# Patient Record
Sex: FEMALE | Race: OTHER | HISPANIC OR LATINO | Employment: FULL TIME | URBAN - METROPOLITAN AREA
[De-identification: names, ages, dates, MRNs, and addresses within clinical notes are randomized per-mention and may not be internally consistent; named-entity substitution may affect disease eponyms.]

---

## 2020-12-21 ENCOUNTER — NURSE TRIAGE (OUTPATIENT)
Dept: OTHER | Facility: OTHER | Age: 25
End: 2020-12-21

## 2020-12-21 DIAGNOSIS — B34.9 VIRAL SYNDROME: Primary | ICD-10-CM

## 2020-12-21 DIAGNOSIS — B34.9 VIRAL SYNDROME: ICD-10-CM

## 2020-12-21 PROCEDURE — U0003 INFECTIOUS AGENT DETECTION BY NUCLEIC ACID (DNA OR RNA); SEVERE ACUTE RESPIRATORY SYNDROME CORONAVIRUS 2 (SARS-COV-2) (CORONAVIRUS DISEASE [COVID-19]), AMPLIFIED PROBE TECHNIQUE, MAKING USE OF HIGH THROUGHPUT TECHNOLOGIES AS DESCRIBED BY CMS-2020-01-R: HCPCS | Performed by: FAMILY MEDICINE

## 2020-12-22 LAB — SARS-COV-2 RNA SPEC QL NAA+PROBE: NOT DETECTED

## 2020-12-30 ENCOUNTER — ANESTHESIA EVENT (OUTPATIENT)
Dept: PERIOP | Facility: HOSPITAL | Age: 25
End: 2020-12-30
Payer: COMMERCIAL

## 2020-12-30 ENCOUNTER — ANESTHESIA (OUTPATIENT)
Dept: PERIOP | Facility: HOSPITAL | Age: 25
End: 2020-12-30
Payer: COMMERCIAL

## 2020-12-30 ENCOUNTER — ULTRASOUND (OUTPATIENT)
Dept: OBGYN CLINIC | Facility: CLINIC | Age: 25
End: 2020-12-30
Payer: COMMERCIAL

## 2020-12-30 ENCOUNTER — ROUTINE PRENATAL (OUTPATIENT)
Dept: OBGYN CLINIC | Facility: CLINIC | Age: 25
End: 2020-12-30
Payer: COMMERCIAL

## 2020-12-30 ENCOUNTER — HOSPITAL ENCOUNTER (OUTPATIENT)
Facility: HOSPITAL | Age: 25
Setting detail: OUTPATIENT SURGERY
Discharge: HOME | End: 2020-12-30
Attending: OBSTETRICS & GYNECOLOGY | Admitting: OBSTETRICS & GYNECOLOGY
Payer: COMMERCIAL

## 2020-12-30 VITALS
RESPIRATION RATE: 18 BRPM | OXYGEN SATURATION: 98 % | HEIGHT: 60 IN | BODY MASS INDEX: 20.82 KG/M2 | DIASTOLIC BLOOD PRESSURE: 56 MMHG | HEART RATE: 94 BPM | SYSTOLIC BLOOD PRESSURE: 114 MMHG | WEIGHT: 106.04 LBS | TEMPERATURE: 98.2 F

## 2020-12-30 VITALS — HEART RATE: 65 BPM

## 2020-12-30 DIAGNOSIS — Z36.9 ANTENATAL SCREENING ENCOUNTER: Primary | ICD-10-CM

## 2020-12-30 DIAGNOSIS — O20.9 BLEEDING IN EARLY PREGNANCY: ICD-10-CM

## 2020-12-30 DIAGNOSIS — O02.1 MISSED AB: ICD-10-CM

## 2020-12-30 DIAGNOSIS — Z98.890 S/P D&C (STATUS POST DILATION AND CURETTAGE): Primary | ICD-10-CM

## 2020-12-30 DIAGNOSIS — O02.1 MISSED ABORTION: Primary | ICD-10-CM

## 2020-12-30 PROBLEM — Z87.891 EX-SMOKER: Status: ACTIVE | Noted: 2020-12-30

## 2020-12-30 PROBLEM — Z86.2 HISTORY OF ANEMIA: Status: ACTIVE | Noted: 2020-12-30

## 2020-12-30 LAB
ABO GROUP BLD: NORMAL
ABO GROUP BLD: NORMAL
BLD GP AB SCN SERPL QL: NEGATIVE
ERYTHROCYTE [DISTWIDTH] IN BLOOD BY AUTOMATED COUNT: 12.9 % (ref 11.6–15.1)
HCT VFR BLD AUTO: 36.7 % (ref 34.8–46.1)
HGB BLD-MCNC: 12.1 G/DL (ref 11.5–15.4)
MCH RBC QN AUTO: 31.3 PG (ref 26.8–34.3)
MCHC RBC AUTO-ENTMCNC: 33 G/DL (ref 31.4–37.4)
MCV RBC AUTO: 95 FL (ref 82–98)
PLATELET # BLD AUTO: 220 THOUSANDS/UL (ref 149–390)
PMV BLD AUTO: 10.6 FL (ref 8.9–12.7)
RBC # BLD AUTO: 3.87 MILLION/UL (ref 3.81–5.12)
RH BLD: POSITIVE
RH BLD: POSITIVE
SPECIMEN EXPIRATION DATE: NORMAL
WBC # BLD AUTO: 8.03 THOUSAND/UL (ref 4.31–10.16)

## 2020-12-30 PROCEDURE — 59820 CARE OF MISCARRIAGE: CPT | Performed by: OBSTETRICS & GYNECOLOGY

## 2020-12-30 PROCEDURE — 88305 TISSUE EXAM BY PATHOLOGIST: CPT | Performed by: PATHOLOGY

## 2020-12-30 PROCEDURE — 86850 RBC ANTIBODY SCREEN: CPT | Performed by: OBSTETRICS & GYNECOLOGY

## 2020-12-30 PROCEDURE — 85027 COMPLETE CBC AUTOMATED: CPT | Performed by: OBSTETRICS & GYNECOLOGY

## 2020-12-30 PROCEDURE — 86901 BLOOD TYPING SEROLOGIC RH(D): CPT | Performed by: OBSTETRICS & GYNECOLOGY

## 2020-12-30 PROCEDURE — 99203 OFFICE O/P NEW LOW 30 MIN: CPT | Performed by: OBSTETRICS & GYNECOLOGY

## 2020-12-30 PROCEDURE — 86900 BLOOD TYPING SEROLOGIC ABO: CPT | Performed by: OBSTETRICS & GYNECOLOGY

## 2020-12-30 PROCEDURE — 76817 TRANSVAGINAL US OBSTETRIC: CPT | Performed by: OBSTETRICS & GYNECOLOGY

## 2020-12-30 RX ORDER — MIDAZOLAM HYDROCHLORIDE 2 MG/2ML
INJECTION, SOLUTION INTRAMUSCULAR; INTRAVENOUS AS NEEDED
Status: DISCONTINUED | OUTPATIENT
Start: 2020-12-30 | End: 2020-12-30

## 2020-12-30 RX ORDER — ONDANSETRON 2 MG/ML
4 INJECTION INTRAMUSCULAR; INTRAVENOUS EVERY 6 HOURS PRN
Status: DISCONTINUED | OUTPATIENT
Start: 2020-12-30 | End: 2020-12-30 | Stop reason: HOSPADM

## 2020-12-30 RX ORDER — ONDANSETRON 2 MG/ML
INJECTION INTRAMUSCULAR; INTRAVENOUS AS NEEDED
Status: DISCONTINUED | OUTPATIENT
Start: 2020-12-30 | End: 2020-12-30

## 2020-12-30 RX ORDER — IBUPROFEN 600 MG/1
600 TABLET ORAL EVERY 6 HOURS PRN
Qty: 30 TABLET | Refills: 0
Start: 2020-12-30 | End: 2021-07-20

## 2020-12-30 RX ORDER — ONDANSETRON 2 MG/ML
4 INJECTION INTRAMUSCULAR; INTRAVENOUS ONCE AS NEEDED
Status: DISCONTINUED | OUTPATIENT
Start: 2020-12-30 | End: 2020-12-30 | Stop reason: HOSPADM

## 2020-12-30 RX ORDER — LIDOCAINE HYDROCHLORIDE 20 MG/ML
INJECTION, SOLUTION EPIDURAL; INFILTRATION; INTRACAUDAL; PERINEURAL AS NEEDED
Status: DISCONTINUED | OUTPATIENT
Start: 2020-12-30 | End: 2020-12-30

## 2020-12-30 RX ORDER — DEXAMETHASONE SODIUM PHOSPHATE 4 MG/ML
INJECTION, SOLUTION INTRA-ARTICULAR; INTRALESIONAL; INTRAMUSCULAR; INTRAVENOUS; SOFT TISSUE AS NEEDED
Status: DISCONTINUED | OUTPATIENT
Start: 2020-12-30 | End: 2020-12-30

## 2020-12-30 RX ORDER — PROPOFOL 10 MG/ML
INJECTION, EMULSION INTRAVENOUS AS NEEDED
Status: DISCONTINUED | OUTPATIENT
Start: 2020-12-30 | End: 2020-12-30

## 2020-12-30 RX ORDER — DOXYCYCLINE HYCLATE 100 MG/1
200 CAPSULE ORAL ONCE
Status: DISCONTINUED | OUTPATIENT
Start: 2020-12-30 | End: 2020-12-30

## 2020-12-30 RX ORDER — EPHEDRINE SULFATE 50 MG/ML
INJECTION INTRAVENOUS AS NEEDED
Status: DISCONTINUED | OUTPATIENT
Start: 2020-12-30 | End: 2020-12-30

## 2020-12-30 RX ORDER — IBUPROFEN 600 MG/1
600 TABLET ORAL EVERY 6 HOURS PRN
Status: DISCONTINUED | OUTPATIENT
Start: 2020-12-30 | End: 2020-12-30 | Stop reason: HOSPADM

## 2020-12-30 RX ORDER — FENTANYL CITRATE/PF 50 MCG/ML
25 SYRINGE (ML) INJECTION
Status: DISCONTINUED | OUTPATIENT
Start: 2020-12-30 | End: 2020-12-30 | Stop reason: HOSPADM

## 2020-12-30 RX ORDER — FENTANYL CITRATE 50 UG/ML
INJECTION, SOLUTION INTRAMUSCULAR; INTRAVENOUS AS NEEDED
Status: DISCONTINUED | OUTPATIENT
Start: 2020-12-30 | End: 2020-12-30

## 2020-12-30 RX ORDER — KETOROLAC TROMETHAMINE 30 MG/ML
INJECTION, SOLUTION INTRAMUSCULAR; INTRAVENOUS AS NEEDED
Status: DISCONTINUED | OUTPATIENT
Start: 2020-12-30 | End: 2020-12-30

## 2020-12-30 RX ORDER — ACETAMINOPHEN 325 MG/1
650 TABLET ORAL EVERY 6 HOURS PRN
Status: DISCONTINUED | OUTPATIENT
Start: 2020-12-30 | End: 2020-12-30 | Stop reason: HOSPADM

## 2020-12-30 RX ORDER — SENNOSIDES 8.6 MG
650 CAPSULE ORAL EVERY 8 HOURS PRN
Qty: 30 TABLET | Refills: 0
Start: 2020-12-30 | End: 2021-07-20

## 2020-12-30 RX ORDER — SODIUM CHLORIDE 9 MG/ML
125 INJECTION, SOLUTION INTRAVENOUS CONTINUOUS
Status: DISCONTINUED | OUTPATIENT
Start: 2020-12-30 | End: 2020-12-30 | Stop reason: HOSPADM

## 2020-12-30 RX ADMIN — MIDAZOLAM 2 MG: 1 INJECTION INTRAMUSCULAR; INTRAVENOUS at 13:39

## 2020-12-30 RX ADMIN — SODIUM CHLORIDE 125 ML/HR: 0.9 INJECTION, SOLUTION INTRAVENOUS at 13:19

## 2020-12-30 RX ADMIN — LIDOCAINE HYDROCHLORIDE 40 MG: 20 INJECTION, SOLUTION EPIDURAL; INFILTRATION; INTRACAUDAL; PERINEURAL at 13:49

## 2020-12-30 RX ADMIN — FENTANYL CITRATE 50 MCG: 50 INJECTION, SOLUTION INTRAMUSCULAR; INTRAVENOUS at 13:58

## 2020-12-30 RX ADMIN — DEXAMETHASONE SODIUM PHOSPHATE 4 MG: 4 INJECTION INTRA-ARTICULAR; INTRALESIONAL; INTRAMUSCULAR; INTRAVENOUS; SOFT TISSUE at 13:49

## 2020-12-30 RX ADMIN — KETOROLAC TROMETHAMINE 30 MG: 30 INJECTION, SOLUTION INTRAMUSCULAR at 14:14

## 2020-12-30 RX ADMIN — ONDANSETRON 4 MG: 2 INJECTION INTRAMUSCULAR; INTRAVENOUS at 13:49

## 2020-12-30 RX ADMIN — SODIUM CHLORIDE: 0.9 INJECTION, SOLUTION INTRAVENOUS at 14:09

## 2020-12-30 RX ADMIN — FENTANYL CITRATE 25 MCG: 50 INJECTION, SOLUTION INTRAMUSCULAR; INTRAVENOUS at 14:06

## 2020-12-30 RX ADMIN — EPHEDRINE SULFATE 5 MG: 50 INJECTION, SOLUTION INTRAVENOUS at 14:09

## 2020-12-30 RX ADMIN — FENTANYL CITRATE 25 MCG: 50 INJECTION, SOLUTION INTRAMUSCULAR; INTRAVENOUS at 14:22

## 2020-12-30 RX ADMIN — DOXYCYCLINE 100 MG: 100 INJECTION, POWDER, LYOPHILIZED, FOR SOLUTION INTRAVENOUS at 13:19

## 2020-12-30 RX ADMIN — PROPOFOL 200 MG: 10 INJECTION, EMULSION INTRAVENOUS at 13:49

## 2020-12-30 NOTE — DISCHARGE INSTRUCTIONS
Dilation and Curettage   WHAT YOU NEED TO KNOW:   Dilation and curettage (D&C) is a procedure to remove tissue from the lining of your uterus  DISCHARGE INSTRUCTIONS:   Call 911 for any of the following: You have signs of an allergic reaction, such as hives, trouble breathing, or severe swelling  Seek care immediately if:   · You have heavy vaginal bleeding that soaks 1 pad in 1 hour for 2 hours in a row  · You have a fever higher than 100 4°F (38°C)  · You have abdominal cramps for more than 2 days  · Your pain does not get better, even after treatment  Contact your healthcare provider if:   · You have foul-smelling vaginal discharge  · You do not get your monthly period  · You feel depressed or anxious  · You feel very tired and weak  · You have questions or concerns about your condition or care  Medicines: You may need any of the following:  · Prescription pain medicine  may be given  Do not wait until the pain is severe before you take your medicine  Ask your healthcare provider how to take this medicine safely  · Antibiotics  help fight or prevent a bacterial infection  · Take your medicine as directed  Contact your healthcare provider if you think your medicine is not helping or if you have side effects  Tell him or her if you are allergic to any medicine  Keep a list of the medicines, vitamins, and herbs you take  Include the amounts, and when and why you take them  Bring the list or the pill bottles to follow-up visits  Carry your medicine list with you in case of an emergency  Self-care:   · Use sanitary pads if needed  You may have light bleeding for up to 2 weeks  Do not use tampons  Use sanitary pads instead  This will help prevent a vaginal infection  · Rest as needed  Slowly start to do more each day  Return to your daily activities as directed  · Do not have sex for at least 2 weeks after the procedure    This will help prevent an infection  · Use birth control right after your procedure  Your monthly period should start again in 4 to 8 weeks  During this time, you could still ovulate (release an egg)  Use birth control as directed to prevent pregnancy during this time  Follow up with your healthcare provider as directed:  Write down your questions so you remember to ask them during your visits  © Copyright [address] Information is for End User's use only and may not be sold, redistributed or otherwise used for commercial purposes  All illustrations and images included in CareNotes® are the copyrighted property of [employer]  or [address] [place], [place] [address]   The above information is an  only  It is not intended as medical advice for individual conditions or treatments  Talk to your doctor, nurse or pharmacist before following any medical regimen to see if it is safe and effective for you

## 2020-12-30 NOTE — OP NOTE
OPERATIVE REPORT  PATIENT NAME: Kelly Martinez    :  1995  MRN: [id number]  Pt Location: [facility] OR ROOM 01    SURGERY DATE: 2020    Surgeon(s) and Role:     * Stephanie Esqueda MD - Primary     * Jeffrey Fleming MD - Assisting    Preop Diagnosis:  Missed ab [O02 1]    Post-Op Diagnosis Codes:     * Missed ab [O02 1]    Procedure(s) (LRB):  DILATATION AND EVACUATION (D&E) ( 8 OF WEEKS) (N/A)    Specimen(s):  ID Type Source Tests Collected by Time Destination   1 :  Tissue Products of Conception TISSUE EXAM Stephanie Esqueda MD 2020 [id number]        Estimated Blood Loss:   150cc    Drains:  None    Anesthesia Type:   General LMA    Operative Indications:  Missed ab [O02 1]    Operative Findings:  Firm, mobile cervix and anteverted uterus, no palpable adnexal masses  Products of conception collected during procedure  Complications:   None    Procedure and Technique:  The patient was taken to the operating room where she was properly identified  General LMA anesthesia was obtained without difficulty  She was prepped and draped in the normal sterile fashion in the dorsal lithotomy position using the stirrups  Care was taken to avoid excessive flexion or extension of the patients hips and knees or pressure on her extremities  A time out was performed and everyone was in agreement  The patient received IV doxycyline in holding  The bladder was drained with a straight cath for 75cc of clear urine  A weighted speculum and right angle retractor were placed in the patient's vagina and the anterior lip of the cervix was identified and grasped with a tenanculum  The cervix was serially dilated to accommodate the suction device  An 8 curved tip was selected  This was advanced to the fundus and suction was activated  The products of conception were collected in the suction trap  The uterus was felt to clamp down   The suction tip was removed and gentle curettage was performed with a good cry noted throughout the cavity and no further products of conception obtained  The uterus was massaged and firm  All instruments were removed from the patient's vagina  There was no bleeding noted from tenaculum puncture sites  The patient tolerated the procedure well  Sponge, instrument and needle counts were correct times 2  The patient was cleansed, awakened from anesthesia and taken to the recovery room in stable condition      Dr Susi Heredia and I were present for the entire procedure    Patient Disposition:  PACU     SIGNATURE: Sandra Grimm MD  DATE: December 30, 2020  TIME: 2:34 PM

## 2021-01-14 ENCOUNTER — OFFICE VISIT (OUTPATIENT)
Dept: OBGYN CLINIC | Facility: CLINIC | Age: 26
End: 2021-01-14
Payer: COMMERCIAL

## 2021-01-14 VITALS — DIASTOLIC BLOOD PRESSURE: 64 MMHG | BODY MASS INDEX: 20.74 KG/M2 | WEIGHT: 106.2 LBS | SYSTOLIC BLOOD PRESSURE: 104 MMHG

## 2021-01-14 DIAGNOSIS — Z31.69 ENCOUNTER FOR PRECONCEPTION CONSULTATION: ICD-10-CM

## 2021-01-14 DIAGNOSIS — Z98.890 S/P D&C (STATUS POST DILATION AND CURETTAGE): Primary | ICD-10-CM

## 2021-01-14 PROCEDURE — 99024 POSTOP FOLLOW-UP VISIT: CPT | Performed by: OBSTETRICS & GYNECOLOGY

## 2021-01-14 NOTE — PATIENT INSTRUCTIONS
Planning for Pregnancy   WHAT YOU NEED TO KNOW:   Why is it important to plan for pregnancy? There are things you can do to get your body ready for a healthy pregnancy  A healthy pregnancy can improve your chance of having a healthy baby  The steps you need to take and the amount of time needed depends on your current health and habits  Work with your healthcare provider to help you plan a healthy pregnancy  What do I need to know about nutrition and exercise before pregnancy? · Eat a variety of healthy foods  Healthy foods include fruits, vegetables, whole-grain breads, low-fat dairy foods, beans, lean meats, and fish  Limit foods high in sugar, fat, and sodium  Limit your intake of fish to 2 servings each week  Choose fish low in mercury such as canned light tuna, shrimp, salmon, cod, or tilapia  Do not  eat fish high in mercury such as swordfish, tilefish, logan mackerel, and shark  · Take 400 micrograms (mcg) of folic acid each day  This will help to prevent birth defects of the brain and spine such as spina bifida  Most women should take folic acid before pregnancy and up to 12 weeks after getting pregnant  · Exercise for at least 30 minutes, 5 days a week  Some examples of exercise include walking, biking, dancing, and swimming  Include muscle strengthening activities 2 days each week  Regular exercise provides many health benefits  It helps you manage your weight, and decreases your risk for type 2 diabetes, heart disease, stroke, and high blood pressure  Exercise can also help improve your mood  Ask your healthcare provider about the best exercise plan for you  How does weight affect pregnancy? · Obesity  can make it harder for you to get pregnant  It also increases your risk of health problems during pregnancy  Some of these health problems include gestational diabetes, high blood pressure, and infections   It can also increase your baby's risk of health problems such as birth defects  Your baby may also be large and harder to deliver or be born prematurely (early)  Your risk of miscarriage is also higher if you are obese  Work with your healthcare provider to reach a healthy weight before you try to get pregnant  · Being underweight  can also make it hard for you to get pregnant  It can also increase your risk of having a premature baby and miscarriage  Your baby may be born at a low birth weight  What do I need to know about smoking, alcohol, and drugs? · Smoking  increases your risk of a miscarriage and other health problems during pregnancy  Smoking can cause your baby to be born too early or weigh less at birth  Ask your healthcare provider for information if you need help quitting  · Alcohol  passes from your body to your baby through the placenta  It can affect your baby's brain development and cause fetal alcohol syndrome (FAS)  FAS is a group of conditions that causes mental, behavior, and growth problems  Talk to your healthcare provider if you abuse alcohol and need help quitting before pregnancy  · Drugs , such as marijuana and cocaine, should not be used while you are trying to get pregnant or during pregnancy  They increase your risk of problems during pregnancy and increase the risk of having a baby with health problems  These include birth defects, premature birth, and infant death  What do I need to know about medicines and supplements? Tell your healthcare provider about all the medicines and supplements you take  Certain medicines and supplements should not be used during pregnancy  These include over-the-counter medicines, prescription medicines, vitamins, and herbal supplements  He or she may recommend that you take different medicines that are safer during pregnancy  What do I need to know about immunizations? Tell your healthcare provider about all the immunizations you have had   If you have missed any immunizations, your healthcare provider may recommend that you update your immunizations  These include hepatitis B, influenza, MMR (measles, mumps, rubella), Tdap, and varicella immunizations  What tests may I need to have before pregnancy? Your healthcare provider may recommend that you have tests to screen for sexually transmitted infections  These include chlamydia, gonorrhea, herpes, HIV infection, syphilis, and tuberculosis  These infectious diseases should be treated before pregnancy, if needed  What do I need to know about toxic substances? Toxic substances can harm a developing baby  Examples include cleaning products, paints, solvents, pesticides, and other chemical products  They can increase the risk of having a miscarriage, premature birth, and low-birth weight baby  They also increase the risk of developmental delay and childhood cancer  Avoid exposure to toxic substances and materials at work and home  What do I need to know about genetic testing? Tell your healthcare provider about your and your partner's family history of genetic disorders, developmental delays, or other disabilities  Also tell your healthcare provider about any problems you have had in previous pregnancies  Your healthcare provider may recommend that you see a healthcare professional called a genetic counselor  He or she will talk to you about how genes, birth defects, and other medical conditions are passed down  He or she can also tell you about your risk for passing a genetic disease in a future pregnancy  How can I prepare for pregnancy if I have a medical condition? Medical conditions such as diabetes, high blood pressure, asthma, seizure disorders, and thyroid disorders should be managed before pregnancy  Mental health conditions, such as depression and anxiety, should also be treated  This will decrease your risk of having health problems during pregnancy  It will also decrease your baby's risk of medical problems   Medicines used to treat certain conditions are not safe to use during pregnancy and may need to be changed before you get pregnant  Ask your healthcare provider if it is safe for you to get pregnant if you have a medical condition  CARE AGREEMENT:   You have the right to help plan your care  Learn about your health condition and how it may be treated  Discuss treatment options with your healthcare providers to decide what care you want to receive  You always have the right to refuse treatment  The above information is an  only  It is not intended as medical advice for individual conditions or treatments  Talk to your doctor, nurse or pharmacist before following any medical regimen to see if it is safe and effective for you  © Copyright [address] Information is for End User's use only and may not be sold, redistributed or otherwise used for commercial purposes   All illustrations and images included in CareNotes® are the copyrighted property of [employer]  or [address] [place] [address]

## 2021-01-14 NOTE — PROGRESS NOTES
Assessment/Plan   Diagnoses and all orders for this visit:    S/P D&C (status post dilation and curettage)    Encounter for preconception consultation    1  Status post D&E for missed AB-done 20  Patient did have some intermittently heavy bleeding but denies any symptoms at this time  Exam is benign  She can proceed with activity as tolerated  2  Rh positive  3  Pregnancy history- 97 4 weeks complicated by slow leak,  32 weeks secondary to cervical dilation   done to anxiety attack which affected her and the baby's heart rate  Termination , D&E after miscarriage 18  The termination and the prior D&E and current D&E pregnancies were with her current partner  Partner also has a child with another  They did not use protection between 2018 and this current nonviable pregnancy  4  History of irregular menses-menstrual cycles every 1-2 months time period  5  Pre conceptual-to continue with vitamins with folic acid  She denies any history of birth defects in her or her partner's family  Recommend up-to-date with vaccination  Patient and her partner have embarked on healthier lifestyle, my support was given  She had concerns about fertility, as no pregnancy between May 2018 and 2020  Should she not get pregnant in 1 year's time, could consider fertility evaluation  She states her employer does provide coverage for fertility evaluation, which could be utilized if needed  6  Other-last Pap documented  at [facility]  To return in the near future for yearly exam or as needed  Subjective   Patient ID: Carmen Gandhi is a 31 y o  female  Vitals:    21 1142   BP: 104/64     Patient was seen today for follow-up visit  Please see assessment plan for details        The following portions of the patient's history were reviewed and updated as appropriate: allergies, current medications, past family history, past medical history, past social history, past surgical history and problem list   Past Medical History:   Diagnosis Date    Former smoker     Stop smoking 2020     Past Surgical History:   Procedure Laterality Date     SECTION  2017    Patient states anxiety attack which affected fetal tracing necessitating    DILATION AND EVACUATION  2016    Elective termination    DILATION AND EVACUATION  2018    Persistent Missed pregnancy    TX SURG RX MISSED ABORTN,1ST TRI N/A 2020    Procedure: DILATATION AND EVACUATION (D&E) ( 8 OF WEEKS); Surgeon: Amy Mathias MD;  Location: [facility];  Service: Gynecology    WISDOM TOOTH EXTRACTION       OB History    Para Term  AB Living   5 2 2   2 2   SAB TAB Ectopic Multiple Live Births   1 1     2      # Outcome Date GA Lbr Mo/2nd Weight Sex Delivery Anes PTL Lv   5 SAB 2018           4 Term 2017     CS-Unspec   OLLIE      Birth Comments: Had anxiety attack affecting fetal status, had      3 TAB 2016           2 Term 2014     Vag-Spont   OLLIE   1                 Current Outpatient Medications:     acetaminophen (TYLENOL) 650 mg CR tablet, Take 1 tablet (650 mg total) by mouth every 8 (eight) hours as needed for mild pain, Disp: 30 tablet, Rfl: 0    Prenatal Vit-Fe Fumarate-FA (PRENATAL VITAMIN PO), Take by mouth, Disp: , Rfl:     ibuprofen (MOTRIN) 600 mg tablet, Take 1 tablet (600 mg total) by mouth every 6 (six) hours as needed for moderate pain (Patient not taking: Reported on 2021), Disp: 30 tablet, Rfl: 0  Allergies   Allergen Reactions    Penicillins Hives     Social History     Socioeconomic History    Marital status: Single     Spouse name: None    Number of children: None    Years of education: None    Highest education level: None   Occupational History    None   Social Needs    Financial resource strain: None    Food insecurity     Worry: None     Inability: None    Transportation needs     Medical: None Non-medical: None   Tobacco Use    Smoking status: Former Smoker     Types: Cigarettes    Smokeless tobacco: Former User    Tobacco comment: quit 09/2020   Substance and Sexual Activity    Alcohol use: Yes     Frequency: Monthly or less    Drug use: No    Sexual activity: Yes     Partners: Male   Lifestyle    Physical activity     Days per week: None     Minutes per session: None    Stress: None   Relationships    Social connections     Talks on phone: None     Gets together: None     Attends Evangelical service: None     Active member of club or organization: None     Attends meetings of clubs or organizations: None     Relationship status: None    Intimate partner violence     Fear of current or ex partner: None     Emotionally abused: None     Physically abused: None     Forced sexual activity: None   Other Topics Concern    None   Social History Narrative    None     History reviewed  No pertinent family history  Review of Systems   Constitutional: Negative for chills, diaphoresis, fatigue and fever  Respiratory: Negative for apnea, cough, chest tightness, shortness of breath and wheezing  Cardiovascular: Negative for chest pain, palpitations and leg swelling  Gastrointestinal: Negative for abdominal distention, abdominal pain, anal bleeding, constipation, diarrhea, nausea, rectal pain and vomiting  Genitourinary: Negative for difficulty urinating, dyspareunia, dysuria, frequency, hematuria, menstrual problem, pelvic pain, urgency, vaginal bleeding, vaginal discharge and vaginal pain  Musculoskeletal: Negative for arthralgias, back pain and myalgias  Skin: Negative for color change and rash  Neurological: Negative for dizziness, syncope, light-headedness, numbness and headaches  Hematological: Negative for adenopathy  Does not bruise/bleed easily  Psychiatric/Behavioral: Negative for dysphoric mood and sleep disturbance  The patient is not nervous/anxious          Objective Physical Exam    Objective      /64 (BP Location: Left arm, Patient Position: Sitting, Cuff Size: Adult)   Wt 48 2 kg (106 lb 3 2 oz)   BMI 20 74 kg/m²     General:   alert and oriented, in no acute distress   Neck:    Breast:    Heart:    Lungs:    Abdomen: soft, non-tender, without masses or organomegaly   Vulva: normal   Vagina: Without erythema or lesions or discharge  Normal   Cervix: Without lesions or discharge or cervicitis    No Cervical motion tenderness   Uterus: top normal size, anteverted, non-tender   Adnexa: no mass, fullness, tenderness   Rectum: deferred    Psych:  Normal mood and affect   Skin:  Without obvious lesions   Eyes: symmetric, with normal movements and reactivity   Musculoskeletal:  Normal muscle tone and movements appreciated

## 2021-01-20 ENCOUNTER — NURSE TRIAGE (OUTPATIENT)
Dept: OTHER | Facility: OTHER | Age: 26
End: 2021-01-20

## 2021-01-20 DIAGNOSIS — Z20.828 EXPOSURE TO SARS-ASSOCIATED CORONAVIRUS: ICD-10-CM

## 2021-01-20 DIAGNOSIS — Z20.828 EXPOSURE TO SARS-ASSOCIATED CORONAVIRUS: Primary | ICD-10-CM

## 2021-01-20 PROCEDURE — 87637 SARSCOV2&INF A&B&RSV AMP PRB: CPT | Performed by: FAMILY MEDICINE

## 2021-01-20 NOTE — TELEPHONE ENCOUNTER
Patient does not have a PCP and is interested in finding a PCP  Patient would like to see Dr Luis M Olivas  Patient was provided with his office number      Reason for Disposition   [1] COVID-19 infection suspected by caller or triager AND [2] mild symptoms (cough, fever, or others) AND [6] no complications or SOB    Protocols used: CORONAVIRUS (COVID-19) [facility]NOS[facility]

## 2021-01-20 NOTE — TELEPHONE ENCOUNTER
Regarding: COVID - Symptomatic - Congestion and headache   ----- Message from Kamini Banks sent at 1/20/2021  9:52 AM EST -----  "My  tested positive on Monday and I was in contact with her last on Friday  I now I have congestion and headache "     NOTE: Sometimes her phone drops calls  Please try to call her again if it does so   [phone] is a back up number

## 2021-01-20 NOTE — TELEPHONE ENCOUNTER
1  Were you within 6 feet or less, for up to 15 minutes or more with a person that has a confirmed COVID-19 test? Yes, manager at work  2  What was the date of your exposure? 01/15/2021 Symptoms started  01/14/2021  3  Are you experiencing any symptoms attributed to the virus?  (Assess for SOB, cough, fever, difficulty breathing) JOY , slight runny nose, nasal congestion, and fatigue  4  HIGH RISK: Do you have any history heart or lung conditions, weakened immune system, diabetes, Asthma, CHF, HIV, COPD, Chemo, renal failure, sickle cell, etc? Healthy  5  PREGNANCY: Are you pregnant or did you recently give birth? Possibly, had a miscarriage last month  Hasn't had regular period yet

## 2021-01-21 LAB
FLUAV RNA NPH QL NAA+PROBE: NOT DETECTED
FLUBV RNA NPH QL NAA+PROBE: NOT DETECTED
RSV RNA NPH QL NAA+PROBE: NOT DETECTED
SARS-COV-2 RNA NPH QL NAA+PROBE: NOT DETECTED

## 2021-06-08 ENCOUNTER — OFFICE VISIT (OUTPATIENT)
Dept: OBGYN CLINIC | Facility: CLINIC | Age: 26
End: 2021-06-08
Payer: COMMERCIAL

## 2021-06-08 VITALS — SYSTOLIC BLOOD PRESSURE: 110 MMHG | DIASTOLIC BLOOD PRESSURE: 64 MMHG | BODY MASS INDEX: 22.07 KG/M2 | WEIGHT: 113 LBS

## 2021-06-08 DIAGNOSIS — O09.899 HISTORY OF PRETERM DELIVERY, CURRENTLY PREGNANT: ICD-10-CM

## 2021-06-08 DIAGNOSIS — Z98.891 HISTORY OF CESAREAN SECTION: ICD-10-CM

## 2021-06-08 DIAGNOSIS — Z32.00 POSSIBLE PREGNANCY: ICD-10-CM

## 2021-06-08 DIAGNOSIS — Z87.59 HISTORY OF MISCARRIAGE: ICD-10-CM

## 2021-06-08 DIAGNOSIS — Z34.90 PREGNANCY AT EARLY STAGE: Primary | ICD-10-CM

## 2021-06-08 PROBLEM — O02.1 MISSED ABORTION: Status: RESOLVED | Noted: 2020-12-30 | Resolved: 2021-06-08

## 2021-06-08 LAB — SL AMB POCT URINE HCG: POSITIVE

## 2021-06-08 PROCEDURE — 99214 OFFICE O/P EST MOD 30 MIN: CPT | Performed by: OBSTETRICS & GYNECOLOGY

## 2021-06-08 NOTE — PROGRESS NOTES
Assessment/Plan   Diagnoses and all orders for this visit:    Pregnancy at early stage  -     hCG, quantitative; Future  -     hCG, quantitative; Future  -     hCG, quantitative; Future  -     hCG, quantitative  -     hCG, quantitative; Future  -     hCG, quantitative    Possible pregnancy  -     POCT urine HCG  -     Obstetric Panel W/Fourth Generation HIV; Future  -     Obstetric Panel W/Fourth Generation HIV  -     Hepatitis panel, acute; Future  -     Hepatitis panel, acute    History of  delivery, currently pregnant    History of  section    History of miscarriage    1  Pregnancy confirmation visit-patient noted LMP of 21, placing her at 5 3 weeks gestation  She had positive pregnancy test on 21, confirmed today  Patient and her partner were counseled about the findings  She continues with prenatal vitamins  She denies any birth defects in her or her partner's family  Medication sheet, nutrition sheet, were given to the patient and reviewed  Laboratory sheet was given to get prenatal labs  Up 2-3 weeks time for ultrasound and 1st prenatal visit  2  Rh positive  3  History of EAB x1, D&E x2 for missed pregnancy  Last D&E was 20 with me  Given this history, given laboratory sheet to get HCG to repeat in 2 days time to assess trajectory  4  Previous  at 03 4 weeks complicated by slow leak  Had  at 32 weeks secondary to the cervix becoming dilated  Patient unclear exactly why  was being done  She had anxiety attack with fetal heart rate changes and more emergent   Given the  delivery x2, could consider Priti  To check old records  Counseled briefly about trial of labor after  and she seems to be interested in this  We will continue discussion  5  History of irregular menses-generally every 1-2 months time  6  Last Pap-2016  To proceed with Pap smear with 1st prenatal visit        Subjective   Patient ID: Siddhartha Coleman Miguel A Estrada is a 31 y o  female  Vitals:    21 1450   BP: 110/64     Patient was seen today for pregnancy confirmation visit  Please see assessment plan for details  The following portions of the patient's history were reviewed and updated as appropriate: allergies, current medications, past family history, past medical history, past social history, past surgical history and problem list   Past Medical History:   Diagnosis Date    Former smoker     Stop smoking 2020     Past Surgical History:   Procedure Laterality Date     SECTION      Patient states anxiety attack which affected fetal tracing necessitating    DILATION AND EVACUATION  2016    Elective termination    DILATION AND EVACUATION  2018    Persistent Missed pregnancy    NV SURG RX MISSED ABORTN,1ST TRI N/A 2020    Procedure: DILATATION AND EVACUATION (D&E) ( 8 OF WEEKS); Surgeon: Ariana Moraes MD;  Location: [facility];  Service: Gynecology    WISDOM TOOTH EXTRACTION       OB History    Para Term  AB Living   5 2 2   2 2   SAB TAB Ectopic Multiple Live Births   1 1     2      # Outcome Date GA Lbr Mo/2nd Weight Sex Delivery Anes PTL Lv   5 SAB 2018           4 Term 2017     CS-Unspec   OLLIE      Birth Comments: Had anxiety attack affecting fetal status, had      3 TAB 2016           2 Term 2014     Vag-Spont   OLLIE   1                 Current Outpatient Medications:     acetaminophen (TYLENOL) 650 mg CR tablet, Take 1 tablet (650 mg total) by mouth every 8 (eight) hours as needed for mild pain, Disp: 30 tablet, Rfl: 0    Prenatal Vit-Fe Fumarate-FA (PRENATAL VITAMIN PO), Take by mouth, Disp: , Rfl:     ibuprofen (MOTRIN) 600 mg tablet, Take 1 tablet (600 mg total) by mouth every 6 (six) hours as needed for moderate pain (Patient not taking: Reported on 2021), Disp: 30 tablet, Rfl: 0  Allergies   Allergen Reactions    Penicillins Hives     Social History Socioeconomic History    Marital status: Single     Spouse name: None    Number of children: None    Years of education: None    Highest education level: None   Occupational History    None   Social Needs    Financial resource strain: None    Food insecurity     Worry: None     Inability: None    Transportation needs     Medical: None     Non-medical: None   Tobacco Use    Smoking status: Former Smoker     Types: Cigarettes    Smokeless tobacco: Former User    Tobacco comment: quit 09/2020   Substance and Sexual Activity    Alcohol use: Not Currently     Frequency: Monthly or less    Drug use: No    Sexual activity: Yes     Partners: Male   Lifestyle    Physical activity     Days per week: None     Minutes per session: None    Stress: None   Relationships    Social connections     Talks on phone: None     Gets together: None     Attends Ca[employer] service: None     Active member of club or organization: None     Attends meetings of clubs or organizations: None     Relationship status: None    Intimate partner violence     Fear of current or ex partner: None     Emotionally abused: None     Physically abused: None     Forced sexual activity: None   Other Topics Concern    None   Social History Narrative    None     History reviewed  No pertinent family history  Review of Systems   Constitutional: Negative for chills, diaphoresis, fatigue and fever  Respiratory: Negative for apnea, cough, chest tightness, shortness of breath and wheezing  Cardiovascular: Negative for chest pain, palpitations and leg swelling  Gastrointestinal: Negative for abdominal distention, abdominal pain, anal bleeding, constipation, diarrhea, nausea, rectal pain and vomiting  Genitourinary: Negative for difficulty urinating, dyspareunia, dysuria, frequency, hematuria, menstrual problem, pelvic pain, urgency, vaginal bleeding, vaginal discharge and vaginal pain     Musculoskeletal: Negative for arthralgias, back pain and myalgias  Skin: Negative for color change and rash  Neurological: Negative for dizziness, syncope, light-headedness, numbness and headaches  Hematological: Negative for adenopathy  Does not bruise/bleed easily  Psychiatric/Behavioral: Negative for dysphoric mood and sleep disturbance  The patient is not nervous/anxious          Objective   Physical Exam  OBGyn Exam     Objective      /64 (BP Location: Left arm, Patient Position: Sitting, Cuff Size: Adult)   Wt 51 3 kg (113 lb)   LMP 05/01/2021   BMI 22 07 kg/m²     General:   alert and oriented, in no acute distress   Neck:    Breast:    Heart:    Lungs:    Abdomen: Nontender   Vulva:    Vagina:    Cervix:    Uterus:    Adnexa:    Rectum:     Psych:  Normal mood and affect   Skin:  Without obvious lesions   Eyes: symmetric, with normal movements and reactivity   Musculoskeletal:  Normal muscle tone and movements appreciated

## 2021-06-08 NOTE — PATIENT INSTRUCTIONS
Pregnancy   WHAT YOU NEED TO KNOW:   What do I need to know about pregnancy? A normal pregnancy lasts about 40 weeks  The first trimester lasts from your last period through the 12th week of pregnancy  The second trimester lasts from the 13th week through the 23rd week  The third trimester lasts from the 24th week until your baby is born  If you know the date of your last period, your healthcare provider can estimate your due date  You may give birth to your baby any time from 37 weeks to 2 weeks after your due date  What is prenatal care? Prenatal care is a series of visits with your healthcare provider throughout your pregnancy  Prenatal care can help prevent problems during pregnancy and childbirth  At each prenatal visit, your healthcare provider will weigh you and check your blood pressure  He or she will also check your baby's heartbeat and growth  You may need the following at some visits:  · A pelvic exam  allows your healthcare provider to see your cervix (the bottom part of your uterus)  Your provider will use a speculum to open your vagina  He or she will check the size and shape of your uterus  At your first prenatal visit, you may also have a Pap smear  This is a test to check your cervix for abnormal cells  · Blood tests  may be done to check for any of the following:    ? Gestational diabetes or anemia (low iron level)    ? Blood type or Rh factor, or certain birth defects    ? Immunity to certain diseases, such as chickenpox or rubella    ? An infection, such as a sexually transmitted infection, HIV, or hepatitis B    · Hepatitis B  may need to be prevented or treated  Hepatitis B is inflammation of the liver caused by the hepatitis B virus (HBV)  HBV can spread from a mother to her baby during delivery  You will be checked for HBV as early as possible in the first trimester of each pregnancy  You need the test even if you received the hepatitis B vaccine or were tested before   You may need to have an HBV infection treated before you give birth  · Urine tests  may also be done to check for sugar and protein  These can be signs of gestational diabetes or preeclampsia  Urine tests may also be done to check for signs of infection  · A fetal ultrasound  shows pictures of your baby inside your uterus  The pictures are used to check your baby's development, movement, and position  · Genetic disorder screening tests  may be offered to you  These tests check your baby's risk for genetic disorders such as Down syndrome  A screening test may include blood tests and an ultrasound  Blood tests may be used to check your DNA or your partner's DNA  Genetic tests are not always accurate or complete  Your baby may be born with a genetic disorder that did not show up in the tests  Talk to your healthcare provider about any concerns you have with genetic testing  What can I do to have a healthy pregnancy? · Eat a variety of healthy foods  Healthy foods include fruits, vegetables, whole-grain breads, low-fat dairy foods, beans, lean meats, and fish  Drink liquids as directed  Ask how much liquid to drink each day and which liquids are best for you  Limit caffeine to less than 200 milligrams each day  Limit your intake of fish to 2 servings each week  Choose fish low in mercury such as canned light tuna, shrimp, crab, salmon, cod, or tilapia  Do not  eat fish high in mercury such as swordfish, tilefish, logan mackerel, and shark  · Take prenatal vitamins as directed  Your need for certain vitamins and minerals, such as folic acid, increases during pregnancy  Prenatal vitamins provide some of the extra vitamins and minerals you need  Prenatal vitamins may also help to decrease the risk for certain birth defects  · Ask how much weight you should gain during your pregnancy  Too much or too little weight gain can be unhealthy for you and your baby      · Talk to your healthcare provider about exercise  Moderate exercise can help you stay fit  Your healthcare provider will help you plan an exercise program that is safe for you during pregnancy  · Do not smoke  Smoking increases your risk for a miscarriage and heart and blood vessel problems  Smoking can cause your baby to be born too early or weigh less at birth  Quit smoking as soon as you think you might be pregnant  Ask your healthcare provider for information if you need help quitting  · Do not drink alcohol  Alcohol passes from your body to your baby through the placenta  It can affect your baby's brain development and cause fetal alcohol syndrome (FAS)  FAS is a group of conditions that causes mental, behavior, and growth problems  · Talk to your healthcare provider before you take any medicines  Many medicines may harm your baby if you take them when you are pregnant  Do not take any medicines, vitamins, herbs, or supplements without first talking to your healthcare provider  Never use illegal or street drugs (such as marijuana or cocaine) while you are pregnant  What body changes may happen during my pregnancy? · Breast changes  you will experience include tenderness and tingling during the early part of your pregnancy  Your breasts will become larger  You may need to use a support bra  You may see a thin, yellow fluid, called colostrum, leak from your nipples during the second trimester  Colostrum is a liquid that changes to milk about 3 days after you give birth  · Skin changes and stretch marks  may occur during your pregnancy  You may have red marks, called stretch marks, on your skin  Stretch marks will usually fade after pregnancy  Use lotion if your skin is dry and itchy  The skin on your face, around your nipples, and below your belly button may darken  Most of the time, your skin will return to its normal color after your baby is born      · Morning sickness  is nausea and vomiting that can happen at any time of day  Avoid fatty and spicy foods  Eat small meals throughout the day instead of large meals  Rachel may help to decrease nausea  Ask your healthcare provider about other ways of decreasing nausea and vomiting  · Heartburn  may be caused by changes in your hormones during pregnancy  Your growing uterus may also push your stomach upward and force stomach acid to back up into your esophagus  Eat 4 or 5 small meals each day instead of large meals  Avoid spicy foods  Avoid eating right before bedtime  · Constipation  may develop during your pregnancy  To treat constipation, eat foods high in fiber such as fiber cereals, beans, fruits, vegetables, whole-grain breads, and prune juice  Get regular exercise and drink plenty of water  Your healthcare provider may also suggest a fiber supplement to soften your bowel movements  Talk to your healthcare provider before you use any medicines to decrease constipation  · Hemorrhoids  are enlarged veins in the rectal area  They may cause pain, itching, and bright red bleeding from your rectum  To decrease your risk for hemorrhoids, prevent constipation and do not strain to have a bowel movement  If you have hemorrhoids, soak in a tub of warm water to ease discomfort  Ask your healthcare provider how you can treat hemorrhoids  · Leg cramps and swelling  may be caused by low calcium levels or the added weight of pregnancy  Raise your legs above the level of your heart to decrease swelling  During a leg cramp, stretch or massage the muscle that has the cramp  Heat may help decrease pain and muscle spasms  Apply heat on your muscle for 20 to 30 minutes every 2 hours for as many days as directed  · Back pain  may occur as your baby grows  Do not stand for long periods of time or lift heavy items  Use good posture while you stand, squat, or bend  Wear low-heeled shoes with good support  Rest may also help to relieve back pain   Ask your healthcare provider about exercises you can do to strengthen your back muscles  What are some safety tips during pregnancy? · Avoid hot tubs and saunas  Do not use a hot tub or sauna while you are pregnant, especially during your first trimester  Hot tubs and saunas may raise your baby's temperature and increase the risk for birth defects  · Avoid toxoplasmosis  This is an infection caused by eating raw meat or being around infected cat feces  It can cause birth defects, miscarriages, and other problems  Wash your hands after you touch raw meat  Make sure any meat is well-cooked before you eat it  Avoid raw eggs and unpasteurized milk  Use gloves or ask someone else to clean your cat's litter box while you are pregnant  · Ask your healthcare provider about travel  The most comfortable time to travel is during the second trimester  Ask your healthcare provider if you can travel after 36 weeks  You may not be able to travel in an airplane after 36 weeks  He or she may also recommend that you avoid long road trips  When should I seek immediate care? · You develop a severe headache that does not go away  · You have new or increased vision changes, such as blurred or spotted vision  · You have new or increased swelling in your face or hands  · You have pain or cramping in your abdomen or low back  · You have vaginal bleeding  When should I call my doctor or obstetrician? · You have abdominal cramps, pressure, or tightening  · You have a change in vaginal discharge  · You cannot keep food or drinks down, and you are losing weight  · You have chills or a fever  · You have vaginal itching, burning, or pain  · You have yellow, green, white, or foul-smelling vaginal discharge  · You have pain or burning when you urinate, less urine than usual, or pink or bloody urine  · You have questions or concerns about your condition or care  CARE AGREEMENT:   You have the right to help plan your care  Learn about your health condition and how it may be treated  Discuss treatment options with your healthcare providers to decide what care you want to receive  You always have the right to refuse treatment  The above information is an  only  It is not intended as medical advice for individual conditions or treatments  Talk to your doctor, nurse or pharmacist before following any medical regimen to see if it is safe and effective for you  © Copyright [employer] 2021 Information is for End User's use only and may not be sold, redistributed or otherwise used for commercial purposes  All illustrations and images included in CareNotes® are the copyrighted property of [employer]  or [employer]   Pregnancy Diet   WHAT YOU NEED TO KNOW:   What is a healthy diet during pregnancy? A healthy diet during pregnancy is a meal plan that provides the amount of calories and nutrients you need during pregnancy  Your body needs extra calories and nutrients to support your growing baby  You need to gain the right amount of weight for a healthy baby and pregnancy  Babies born at a healthy weight have a lower risk of certain health problems at birth and later in life  A healthy diet may help you avoid gaining too much weight  Too much weight gain may cause problems for you during your pregnancy and delivery  What should I avoid while I am pregnant? · Alcohol:  Do not drink alcohol during pregnancy  Alcohol can increase your risk of a miscarriage (losing your baby)  Your baby may also be born too small and have other health problems, such as learning problems later in life  · Caffeine: It is not clear how caffeine affects pregnancy  Limit your intake of caffeine to avoid possible health problems  Caffeine may be found in coffee, tea, cola, sports drinks, and chocolate  · Foods that contain mercury:  Mercury is naturally found in almost all types of fish and shellfish   Some types of fish absorb higher levels of mercury that can be harmful to an unborn baby  Eat only fish and shellfish that are low in mercury  Each week, you may eat up to 12 ounces of fish or shellfish that have low levels of mercury  These include shrimp, canned light tuna, salmon, pollock, and catfish  Eat only 6 ounces of albacore (white) tuna per week  Albacore tuna has more mercury than canned tuna  Do not eat shark, swordfish, logan mackerel, or tilefish  · Raw and undercooked foods: You should not eat undercooked or raw meat, poultry, eggs, fish, or shellfish (shrimp, crab, lobster)  Cook leftover foods and ready-to-eat foods such as hot dogs until they are steaming hot  · Unpasteurized food:  Unpasteurized foods are foods that have not gone through the heating process (pasteurization) that destroys bacteria  You should not drink milk, juice, or cheese that has not been pasteurized  This includes Brie, feta, Camembert, blue, and [place] cheeses  Which foods can I eat while I am pregnant? Eat a variety of foods from each of the food groups listed below  Your dietitian will tell you how many servings you should have from each food group each day to get enough calories  The amount of calories you need depends on your daily activity, your weight before pregnancy, and current weight gain  Healthcare providers divide pregnancy into 3 periods of time called trimesters  In the first trimester, you usually do not need extra calories  In the second and third trimesters, most women should eat about 300 extra calories each day  · Fruits and vegetables:  Half of your plate should contain fruits and vegetables  ? Fruits:  Choose fresh, canned, or dried fruit as often as possible  ? 1 cup of sliced, diced, cooked, or canned fruit (canned in light syrup or 100% juice)    ? 1 large peach, orange, or banana    ? ½ cup of dried fruit    ? 1 cup of fruit juice    ? Vegetables:  Eat more dark green, red, and orange vegetables  Dark green vegetables include broccoli, spinach, leighton lettuce, and jade greens  Examples of orange and red vegetables are carrots, sweet potatoes, winter squash, oranges, and red peppers  ? 1 cup of cooked or raw vegetables    ? 1 cup of vegetable juice    ? 2 cups of raw leafy greens    · Grains:  Half of the grains you eat each day should be whole grains  ? Whole grains:      ? ½ cup of cooked brown rice or cooked oatmeal    ? 1 cup (1 ounce) of whole-grain dry cereal    ? 1 slice of 724% whole-wheat or rye bread    ? 3 cups of popped popcorn    ? Other grains:      ? ½ cup of cooked white rice or pasta    ? ½ of an English muffin    ? 1 small flour or corn tortilla    ? 1 mini-bagel    · Dairy foods:  Choose fat-free or low-fat dairy foods:    ? 1½ ounces of hard cheese (mozzarella, Swiss, cheddar)     ? 1 cup (8 ounces) of low-fat or fat-free milk or yogurt    ? 1 cup of low-fat frozen yogurt or pudding    · Meat and other protein sources:  Choose lean meats and poultry  Bake, broil, and grill meat instead of frying it  Include a variety of seafood in place of some meat and poultry each week  Eat a variety of protein foods:    ? ½ ounce of nuts (12 almonds, 24 pistachios, 7 walnut halves) or 1 tablespoon of peanut butter (1 ounce)    ? ¼ cup of soy tofu or tempeh (1 ounce)    ? 1 egg    ? ¼ cup of cooked dried beans, peas, or lentils (1 ounce)    ? 1 small chicken breast or 1 small trout (about 3 ounces)    ? 1 salmon steak (4 to 6 ounces)    ? 1 small lean hamburger (2 to 3 ounces)    · Fats:  Limit saturated fats, trans fats, and cholesterol  These unhealthy fats are found in shortening, butter, stick margarine, and animal fat  Choose healthy fats such as polyunsaturated and monounsaturated fats:     ? 1 tablespoon of canola, olive, corn, sunflower, or soybean oil    ? 1 tablespoon of soft margarine    ? 1 teaspoon of mayonnaise    ? 2 tablespoons of salad dressing    ?  ½ of an avocado    What vitamin and mineral supplements may I need? Your healthcare provider will tell you if you need a supplement and the type you should take  Talk to your healthcare provider before you take any other kind of supplement, including herbal (natural) supplements  · Prenatal vitamins:  Eat a variety of healthy foods, even if you take a prenatal vitamin  If you forget to take your vitamin, do not take double the amount the next day  · Folic acid:  You need at least 980 mcg of folic acid each day before you get pregnant  Folic acid helps to form your baby's brain and spinal cord in early pregnancy  During pregnancy, your daily need for folic acid increases to about 600 mcg  Get folic acid each day by eating citrus fruits and juices, green leafy vegetables, liver, or dried beans  Folic acid is also added to foods such as breakfast cereals, bread products, flour, and pasta  · Iron:  Iron is a mineral the body needs to make hemoglobin, which is a part of red blood cells  Hemoglobin helps your blood carry oxygen from the lungs to the rest of your body  Foods that are good sources of iron are meat, poultry, fish, beans, spinach, and fortified cereals and breads  Your body will absorb iron better from non-meat sources if you have a source of vitamin C at the same time  Drink tea and coffee separately from iron-fortified foods and iron supplements  You need about 30 mg of iron each day during pregnancy  · Calcium and vitamin D:  Women who do not eat dairy products may need a calcium and vitamin D supplement  Talk to your healthcare provider about calcium supplements if you do not regularly eat good sources of calcium  The amount of calcium you need is about 1,300 mg if you are between 15and 25years old and 1,000 mg if you are 23to 48years old  What diet changes may help if I have morning sickness? Morning sickness is common during the first few months of pregnancy   You may feel nauseated, and you may vomit several times each day  To improve symptoms of morning sickness, eat small, frequent meals instead of 3 large meals  Foods high in carbohydrate, such as crackers, dry toast, and pasta, may be easier for you to eat  Drink liquids between meals rather than with meals  What diet changes may decrease constipation? A high-fiber diet can improve the symptoms of constipation  Whole-grain breakfast cereals, whole-grain breads, and prune juice are high in fiber  Raw fruits and vegetables, and cooked beans are also good sources of fiber  It may also be helpful to increase your intake of fluids and get regular physical activity  Talk with your healthcare provider before you begin any exercise program    What diet changes may decrease heartburn? To improve the symptoms of heartburn, do not lie down right after you eat  When you do lie down, sleep with your head slightly elevated  Eat small, frequent meals instead of 3 large meals  It may also be helpful to avoid caffeine, chocolate, and spicy foods  How can I get enough calcium if I cannot tolerate dairy foods? If you cannot drink milk or eat dairy foods, try lactose-free or lactose-reduced milk or calcium-fortified soy milk  Ask your healthcare provider about pills you can take to help you digest milk products  Eat other drinks and foods that are fortified with calcium, such as orange juice  What other healthy guidelines should I follow? · Vegetarians and vegans: If you are a vegetarian or vegan, get enough protein, vitamin B12, and iron during your pregnancy  Some non-meat sources of these nutrients are fortified cereals, nut butters, soy products (tofu and soymilk), nuts, grains, and legumes  These nutrients are also found in eggs and milk products  · Cravings: You may have cravings for certain foods during your pregnancy  Foods that are high in calories, fat, and sugar should not replace healthy food choices   Some women have cravings for unusual substances such as katerine, dirt, laundry starch, ice, and chalk  This condition is called pica  These may lead to health problems such as anemia and cause other health problems  When should I contact my healthcare provider? · You are losing weight without trying  · You have cravings for substances such as katerine, dirt, laundry starch, or ice  · You have questions or concerns about your condition or care  CARE AGREEMENT:   You have the right to help plan your care  Discuss treatment options with your healthcare provider to decide what care you want to receive  You always have the right to refuse treatment  The above information is an  only  It is not intended as medical advice for individual conditions or treatments  Talk to your doctor, nurse or pharmacist before following any medical regimen to see if it is safe and effective for you  © Copyright [address] Information is for End User's use only and may not be sold, redistributed or otherwise used for commercial purposes   All illustrations and images included in CareNotes® are the copyrighted property of [employer]  or [address] [place] [address]

## 2021-06-21 ENCOUNTER — DOCUMENTATION (OUTPATIENT)
Dept: OBGYN CLINIC | Facility: CLINIC | Age: 26
End: 2021-06-21

## 2021-06-21 PROBLEM — Z86.19 HISTORY OF HERPES SIMPLEX INFECTION: Status: ACTIVE | Noted: 2021-06-21

## 2021-06-21 NOTE — PROGRESS NOTES
Patient record review done  Previous history of SAB, although patient states was elective termination  14-had delivery at 34 5 weeks  Pregnancy was notable to have IUGR  PPROM was noted for 5 days duration  Underwent  4 lb 9 oz female, 2080 g with Apgars 8 in 10  Baby was in NICU for 12 days, now doing well  17-had emergency low-transverse  at 33 0 weeks  Reportedly, was to be admitted at 33 weeks for tocolysis and betamethasone  Had bradycardia and emergency section for suspected abruption  Baby with Apgar 6 and 8, weight 2080 g  Pregnancy was notable for anemia and history of HSV  To me, patient describes some type of anxiety type situation but I could not find documentation of this    2019, spontaneous AB  2019-missed pregnancy with D&E done 19

## 2021-06-22 ENCOUNTER — APPOINTMENT (OUTPATIENT)
Dept: LAB | Facility: HOSPITAL | Age: 26
End: 2021-06-22
Attending: OBSTETRICS & GYNECOLOGY
Payer: COMMERCIAL

## 2021-06-22 DIAGNOSIS — Z34.90 PREGNANCY AT EARLY STAGE: ICD-10-CM

## 2021-06-22 DIAGNOSIS — Z32.00 PREGNANCY EXAMINATION OR TEST, PREGNANCY UNCONFIRMED: ICD-10-CM

## 2021-06-22 LAB
ABO GROUP BLD: NORMAL
B-HCG SERPL-ACNC: ABNORMAL MIU/ML
BASOPHILS # BLD AUTO: 0.03 THOUSANDS/ΜL (ref 0–0.1)
BASOPHILS NFR BLD AUTO: 0 % (ref 0–1)
BLD GP AB SCN SERPL QL: NEGATIVE
EOSINOPHIL # BLD AUTO: 0.07 THOUSAND/ΜL (ref 0–0.61)
EOSINOPHIL NFR BLD AUTO: 1 % (ref 0–6)
ERYTHROCYTE [DISTWIDTH] IN BLOOD BY AUTOMATED COUNT: 13.2 % (ref 11.6–15.1)
HAV IGM SER QL: NORMAL
HBV CORE IGM SER QL: NORMAL
HBV SURFACE AG SER QL: NORMAL
HBV SURFACE AG SER QL: NORMAL
HCT VFR BLD AUTO: 35.2 % (ref 34.8–46.1)
HCV AB SER QL: NORMAL
HGB BLD-MCNC: 11.5 G/DL (ref 11.5–15.4)
IMM GRANULOCYTES # BLD AUTO: 0.03 THOUSAND/UL (ref 0–0.2)
IMM GRANULOCYTES NFR BLD AUTO: 0 % (ref 0–2)
LYMPHOCYTES # BLD AUTO: 2.06 THOUSANDS/ΜL (ref 0.6–4.47)
LYMPHOCYTES NFR BLD AUTO: 25 % (ref 14–44)
MCH RBC QN AUTO: 30.7 PG (ref 26.8–34.3)
MCHC RBC AUTO-ENTMCNC: 32.7 G/DL (ref 31.4–37.4)
MCV RBC AUTO: 94 FL (ref 82–98)
MONOCYTES # BLD AUTO: 0.6 THOUSAND/ΜL (ref 0.17–1.22)
MONOCYTES NFR BLD AUTO: 7 % (ref 4–12)
NEUTROPHILS # BLD AUTO: 5.46 THOUSANDS/ΜL (ref 1.85–7.62)
NEUTS SEG NFR BLD AUTO: 67 % (ref 43–75)
NRBC BLD AUTO-RTO: 0 /100 WBCS
PLATELET # BLD AUTO: 235 THOUSANDS/UL (ref 149–390)
PMV BLD AUTO: 10.3 FL (ref 8.9–12.7)
RBC # BLD AUTO: 3.74 MILLION/UL (ref 3.81–5.12)
RH BLD: POSITIVE
RPR SER QL: NORMAL
RUBV IGG SERPL IA-ACNC: 15.7 IU/ML
SPECIMEN EXPIRATION DATE: NORMAL
WBC # BLD AUTO: 8.25 THOUSAND/UL (ref 4.31–10.16)

## 2021-06-22 PROCEDURE — 80081 OBSTETRIC PANEL INC HIV TSTG: CPT

## 2021-06-22 PROCEDURE — 80074 ACUTE HEPATITIS PANEL: CPT

## 2021-06-22 PROCEDURE — 36415 COLL VENOUS BLD VENIPUNCTURE: CPT

## 2021-06-22 PROCEDURE — 84702 CHORIONIC GONADOTROPIN TEST: CPT

## 2021-06-23 ENCOUNTER — ULTRASOUND (OUTPATIENT)
Dept: OBGYN CLINIC | Facility: CLINIC | Age: 26
End: 2021-06-23
Payer: COMMERCIAL

## 2021-06-23 ENCOUNTER — INITIAL PRENATAL (OUTPATIENT)
Dept: OBGYN CLINIC | Facility: CLINIC | Age: 26
End: 2021-06-23
Payer: COMMERCIAL

## 2021-06-23 DIAGNOSIS — Z36.9 ANTENATAL SCREENING ENCOUNTER: Primary | ICD-10-CM

## 2021-06-23 DIAGNOSIS — O09.899 HISTORY OF PRETERM DELIVERY, CURRENTLY PREGNANT: ICD-10-CM

## 2021-06-23 LAB — HIV 1+2 AB+HIV1 P24 AG SERPL QL IA: NORMAL

## 2021-06-23 PROCEDURE — 87086 URINE CULTURE/COLONY COUNT: CPT | Performed by: OBSTETRICS & GYNECOLOGY

## 2021-06-23 PROCEDURE — 76817 TRANSVAGINAL US OBSTETRIC: CPT | Performed by: OBSTETRICS & GYNECOLOGY

## 2021-06-23 PROCEDURE — OBC: Performed by: OBSTETRICS & GYNECOLOGY

## 2021-06-23 NOTE — PROGRESS NOTES
OB INTAKE INTERVIEW / Joo Stevens  * Pt presents for OB intake  * Accompanied by: Gustabo Linton  *  *Pt's LMP was 2021  *Ultrasound date:2021   7weeks 0days  *Estimated date of delivery: 2022   * confirmed by US  *Patient's Occupation: [employer]  *Signs/Symptoms of Pregnancy   *No Constipation    *No Headaches   *No Cramping  *No Spotting    *No PICA cravings    Diabetes  If any of the following answers are  yes, please order 1 hour GTT, 50g   *No Hx of GDM    *No BMI >35    *Yes First degree relative with type 2 diabetes    *No Hx of PCOS   *No Current metformin use    *No Prior hx of LGA/macrosomia    *No AMA with other risk factors     Hypertension- If any of the following answers are yes, please order preeclampsia labs including 24 hour urine protein   *No Hx of chronic HTN    *No Hx of gestational HTN   *No hx of preeclampsia, eclampsia, or HELLP syndrome     *Infection Screening   *No Does the pt have a hx of MRSA? *if yes- follow MRSA protocol and obtain a nasal swab for MRSA culture   *No History of herpes? *No History of COVID?     *Immunizations:   *No Discussed influenza vaccine    *No Discussed TDaP vaccine   *No COVID Vaccine  *Previous Delivery Complications:   *Yes  delivery   *Yes Previous     *Interview education   *Handouts given:    *Baby and Me support center     *[place] sign up instructions    *Lab Locations    *[facility] List/Choosing Pediatrician Sheet    *[facility] Childbirth [facility] Parenting Classes    *Schedule for Prenatal Visits    *Schedule for Ultrasounds    *Pregnancy Warning Signs Reviewed    *Safe Medications During Pregnancy    *[facility]  *Breast Pump Pamphlet    *Cord blood and cord tissue collection information    *SMA and CF Testing information sheet    *CPT Code Sheet    *[employer]     *Dental Letter - patient encouraged to maintain dental cleanings during pregnancy  *FMLA submission form       *[facility]   *Yes discussed genetic testing- pt interested    *Yes referral MFM made         Patient has been informed of basic prenatal advice such as avoiding alcohol, drugs, and smoking  She should remain hydrated and take daily prenatal vitamins  Patient should avoid caffeine, raw sprouts, high mercury fish, undercooked fish, raw eggs, organ meat, unwashed produce, and unpasteurized cheeses, milk, and fruit juice and undercooked meats  She has been informed about toxoplasmosis and to avoid cat feces  *I have these concerns about this prenatal patient: None  *Details that I feel the provider should be aware of:  History of  pregnancy x2     PN1 visit will be scheduled after patient's first work week at her new job  She will contact us after  to set up appointments as she is unsure of her work schedule  She was given our Clinical Coordinators card to contact her regarding this  The patient was oriented to our practice and all questions were answered      Interviewed by:  Teresa Milan RDMS

## 2021-06-25 LAB — BACTERIA UR CULT: NORMAL

## 2021-07-01 ENCOUNTER — HOSPITAL ENCOUNTER (EMERGENCY)
Facility: HOSPITAL | Age: 26
Discharge: HOME | End: 2021-07-02
Attending: EMERGENCY MEDICINE
Payer: COMMERCIAL

## 2021-07-01 VITALS
DIASTOLIC BLOOD PRESSURE: 57 MMHG | HEART RATE: 90 BPM | TEMPERATURE: 98.5 F | WEIGHT: 114.42 LBS | RESPIRATION RATE: 16 BRPM | BODY MASS INDEX: 22.35 KG/M2 | OXYGEN SATURATION: 99 % | SYSTOLIC BLOOD PRESSURE: 115 MMHG

## 2021-07-01 DIAGNOSIS — O20.9 VAGINAL BLEEDING IN PREGNANCY, FIRST TRIMESTER: Primary | ICD-10-CM

## 2021-07-01 LAB
BILIRUB UR QL STRIP: NEGATIVE
CLARITY UR: CLEAR
COLOR UR: YELLOW
GLUCOSE UR STRIP-MCNC: NEGATIVE MG/DL
HGB UR QL STRIP.AUTO: ABNORMAL
KETONES UR STRIP-MCNC: NEGATIVE MG/DL
LEUKOCYTE ESTERASE UR QL STRIP: NEGATIVE
NITRITE UR QL STRIP: NEGATIVE
PH UR STRIP.AUTO: 7 [PH] (ref 4.5–8)
PROT UR STRIP-MCNC: NEGATIVE MG/DL
SP GR UR STRIP.AUTO: 1.02 (ref 1–1.03)
UROBILINOGEN UR QL STRIP.AUTO: 0.2 E.U./DL

## 2021-07-01 PROCEDURE — 81001 URINALYSIS AUTO W/SCOPE: CPT

## 2021-07-01 PROCEDURE — 87086 URINE CULTURE/COLONY COUNT: CPT

## 2021-07-01 PROCEDURE — 84702 CHORIONIC GONADOTROPIN TEST: CPT | Performed by: PHYSICIAN ASSISTANT

## 2021-07-01 PROCEDURE — 99284 EMERGENCY DEPT VISIT MOD MDM: CPT | Performed by: PHYSICIAN ASSISTANT

## 2021-07-01 PROCEDURE — 36415 COLL VENOUS BLD VENIPUNCTURE: CPT | Performed by: PHYSICIAN ASSISTANT

## 2021-07-01 PROCEDURE — 99284 EMERGENCY DEPT VISIT MOD MDM: CPT

## 2021-07-02 LAB
B-HCG SERPL-ACNC: ABNORMAL MIU/ML
BACTERIA UR QL AUTO: ABNORMAL /HPF
NON-SQ EPI CELLS URNS QL MICRO: ABNORMAL /HPF
RBC #/AREA URNS AUTO: ABNORMAL /HPF
WBC #/AREA URNS AUTO: ABNORMAL /HPF

## 2021-07-02 NOTE — ED PROVIDER NOTES
History  Chief Complaint   Patient presents with    Vaginal Bleeding - Pregnant     pt reports that she is appropx 8-9 weeks pregnant  had two back to back miscarriages  states that she started with vaginal bleeding today  approx 20 min pta  denies abd cramping  58-year-old  female at 6w2d gestation according to [address] performed on 21, LMP 2021, who presents to the emergency department for complaint of vaginal bleeding  Patient reports that approximately 20 minutes ago she was urinating, wiped, and noticed a small amount of blood on the toilet paper  She sat on the toilet a while longer and then noticed a small amount of blood in the toilet, no clots  She denies any further vaginal bleeding since then  Further denies any abdominal or pelvic cramping, fluid gush, UTI symptoms  She is not yet feeling the baby move  She had sexual intercourse in the last 48 hours  She has a history of premature delivery, spontaneous , and elective   She has established prenatal care; upon records review, she had ultrasound performed on 2021, though I cannot see any results from this  Prior to Admission Medications   Prescriptions Last Dose Informant Patient Reported? Taking?    Prenatal Vit-Fe Fumarate-FA (PRENATAL VITAMIN PO)   Yes No   Sig: Take by mouth   acetaminophen (TYLENOL) 650 mg CR tablet   No No   Sig: Take 1 tablet (650 mg total) by mouth every 8 (eight) hours as needed for mild pain   ibuprofen (MOTRIN) 600 mg tablet   No No   Sig: Take 1 tablet (600 mg total) by mouth every 6 (six) hours as needed for moderate pain   Patient not taking: Reported on 2021      Facility-Administered Medications: None       Past Medical History:   Diagnosis Date    Anemia     Former smoker     Stop smoking 2020    Miscarriage     Varicella     vaccine       Past Surgical History:   Procedure Laterality Date     SECTION  2017    Patient states anxiety attack which affected fetal tracing necessitating    DILATION AND EVACUATION  2016    Elective termination    DILATION AND EVACUATION  2018    Persistent Missed pregnancy    [facility] SURG RX MISSED ABORTN,1ST TRI N/A 2020    Procedure: DILATATION AND EVACUATION (D&E) ( 8 OF WEEKS); Surgeon: Kamila Frost MD;  [facility];  Service: Gynecology    WISDOM TOOTH EXTRACTION         Family History   Problem Relation Age of Onset    Depression Mother     Hypertension Father     Diabetes Father     Asthma Brother     Hyperlipidemia Brother      I have reviewed and agree with the history as documented  E-Cigarette/Vaping    E-Cigarette Use Never User      E-Cigarette/Vaping Substances    Nicotine No     THC No     CBD No     Flavoring No     Other No     Unknown No      Social History     Tobacco Use    Smoking status: Former Smoker     Types: Cigarettes    Smokeless tobacco: Former User    Tobacco comment: quit 2020   Vaping Use    Vaping Use: Never used   Substance Use Topics    Alcohol use: Not Currently    Drug use: No       Review of Systems   Constitutional: Negative for activity change, appetite change, chills, fatigue and fever  Eyes: Negative for visual disturbance  Respiratory: Negative for chest tightness and shortness of breath  Cardiovascular: Negative for chest pain  Gastrointestinal: Negative for abdominal distention, abdominal pain, nausea and vomiting  Genitourinary: Positive for vaginal bleeding  Negative for decreased urine volume, difficulty urinating, dysuria, flank pain, frequency, genital sores, hematuria, pelvic pain, urgency, vaginal discharge and vaginal pain  Musculoskeletal: Negative for back pain  Skin: Negative for color change and rash  Neurological: Negative for dizziness, seizures, syncope, weakness, light-headedness and headaches  All other systems reviewed and are negative  Physical Exam  Physical Exam  Vitals reviewed  Constitutional:       General: She is awake  She is not in acute distress  Appearance: Normal appearance  She is well-developed  She is not ill-appearing or toxic-appearing  HENT:      Head: Normocephalic and atraumatic  Mouth/Throat:      Lips: Pink  Mouth: Mucous membranes are moist       Pharynx: Oropharynx is clear  Uvula midline  Eyes:      Extraocular Movements: Extraocular movements intact  Conjunctiva/sclera: Conjunctivae normal       Pupils: Pupils are equal, round, and reactive to light  Cardiovascular:      Rate and Rhythm: Normal rate and regular rhythm  Pulses: Normal pulses  Pulmonary:      Effort: Pulmonary effort is normal       Breath sounds: Normal breath sounds and air entry  Abdominal:      General: Bowel sounds are normal  There is no distension  Palpations: Abdomen is soft  There is no hepatomegaly, splenomegaly or mass  Tenderness: There is no abdominal tenderness  Musculoskeletal:         General: Normal range of motion  Cervical back: Full passive range of motion without pain, normal range of motion and neck supple  Skin:     General: Skin is warm  Capillary Refill: Capillary refill takes less than 2 seconds  Findings: No erythema, lesion or rash  Neurological:      Mental Status: She is alert and oriented to person, place, and time  Psychiatric:         Behavior: Behavior is cooperative           Vital Signs  ED Triage Vitals [07/01/21 2117]   Temperature Pulse Respirations Blood Pressure SpO2   98 5 °F (36 9 °C) 69 16 113/55 100 %      Temp Source Heart Rate Source Patient Position - Orthostatic VS BP Location FiO2 (%)   Oral Monitor Sitting Left arm --      Pain Score       No Pain           Vitals:    07/01/21 2117 07/01/21 2333   BP: 113/55 115/57   Pulse: 69 90   Patient Position - Orthostatic VS: Sitting Sitting         Visual Acuity      ED Medications  Medications - No data to display    Diagnostic Studies  Results Reviewed     Procedure Component Value Units Date/Time    Urine culture [[id number]] Collected: 07/01/21 2334    Lab Status: Final result Specimen: Urine, Clean Catch Updated: 07/03/21 0940     Urine Culture No Growth <1000 cfu/mL    Urine Microscopic [[id number]]  (Abnormal) Collected: 07/01/21 2334    Lab Status: Final result Specimen: Urine, Clean Catch Updated: 07/02/21 0033     RBC, UA 0-1 /hpf      WBC, UA 0-1 /hpf      Epithelial Cells Occasional /hpf      Bacteria, UA Occasional /hpf     Quantitative hCG [[id number]]  (Abnormal) Collected: 07/01/21 2254    Lab Status: Final result Specimen: Blood from Arm, Right Updated: 07/02/21 0012     HCG, Quant 100,275 7 mIU/mL     Narrative:       Expected Ranges:     Approximate               Approximate HCG  Gestation age          Concentration ( mIU/mL)  _____________          ______________________   Jeananne Gore                      HCG values  0 2-1                       5-50  1-2                           2-3                         100-5000  3-4                         500-61925  4-5                         1000-06818  5-6                         [id number]  6-8                         [id number]  8-12                        [id number]      Urine Macroscopic, POC [[id number]]  (Abnormal) Collected: 07/01/21 2334    Lab Status: Final result Specimen: Urine Updated: 07/01/21 2335     Color, UA Yellow     Clarity, UA Clear     pH, UA 7 0     Leukocytes, UA Negative     Nitrite, UA Negative     Protein, UA Negative mg/dl      Glucose, UA Negative mg/dl      Ketones, UA Negative mg/dl      Urobilinogen, UA 0 2 E U /dl      Bilirubin, UA Negative     Blood, UA Trace     Specific Dana, UA 1 025    Narrative:      CLINITEK RESULT                 No orders to display              Procedures  Procedures         ED Course  ED Course as of Jul 07 1704   Thu Jul 01, 2021 2317 Bedside ultrasound performed by me to assess fetal viability    Single intrauterine gestation visualized  No discrete fetal parts identified, likely due to early gestation  Active heart beat visualized  Heart rate average 167  Fri Jul 02, 2021   0110 No UTI or asymptomatic bacteruria  Patient states she has not had any active bleeding since ED arrival   Advised close f/u with OBGYN, re draw b hcg in 2 days  Consider vaginal bleeding secondary to sexual intercourse versus subchorionic hemorrhage versus threatened miscarriage  MDM  Number of Diagnoses or Management Options  Vaginal bleeding in pregnancy, first trimester  Diagnosis management comments: Vaginal bleeding in early pregnancy  Will check beta-hCG level as well as UA for any UTI  Patient had transvaginal ultrasound performed, confirming pregnancy at7w0d on 06/23/21, which rules out ectopic pregnancy  Will perform bedside ultrasound here to assess fetal viability  Consider physiologic bleeding of pregnancy versus threatened miscarriage versus subchorionic hemorrhage       Amount and/or Complexity of Data Reviewed  Decide to obtain previous medical records or to obtain history from someone other than the patient: yes  Obtain history from someone other than the patient: yes  Review and summarize past medical records: yes  Discuss the patient with other providers: yes    Patient Progress  Patient progress: stable (See ED course note for dispo and plan  I reviewed and discussed all lab and bedside ultrasound findings with the patient at bedside  I discussed emergency department return parameters  I answered any and all questions the patient had regarding emergency department course of evaluation and treatment   The patient verbalized understanding of and agreement with plan   )      Disposition  Final diagnoses:   Vaginal bleeding in pregnancy, first trimester     Time reflects when diagnosis was documented in both MDM as applicable and the Disposition within this note     Time User Action Codes Description Comment    7/2/2021 12:57 AM Timi Bellis Add [N93 9] Vaginal bleeding     7/2/2021 12:57 AM Timi Bellis Remove [N93 9] Vaginal bleeding     7/2/2021 12:57 AM Timi Bellis Add [O20 9] Vaginal bleeding in pregnancy, first trimester       ED Disposition     ED Disposition Condition Date/Time Comment    Discharge Stable Fri Jul 2, 2021 12:57 AM Jeovanny Marcelo discharge to home/self care  Follow-up Information     Follow up With Specialties Details Why Contact Info Additional [address] [facility] Emergency Department Emergency Medicine Go to   [address] [facility] [address]  [address] [facility] Emergency Department, [address] [place] [address] , [place], [place], [address] Obstetrics and Gynecology Call in 1 day For further evaluation [address] [facility] [address]  [facility], [address] [place] [address], [place], [place], [address] [phone]          Discharge Medication List as of 7/2/2021 12:58 AM      CONTINUE these medications which have NOT CHANGED    Details   acetaminophen (TYLENOL) 650 mg CR tablet Take 1 tablet (650 mg total) by mouth every 8 (eight) hours as needed for mild pain, Starting Wed 12/30/2020, No Print      ibuprofen (MOTRIN) 600 mg tablet Take 1 tablet (600 mg total) by mouth every 6 (six) hours as needed for moderate pain, Starting Wed 12/30/2020, No Print      Prenatal Vit-Fe Fumarate-FA (PRENATAL VITAMIN PO) Take by mouth, Historical Med           Outpatient Discharge Orders   hCG, quantitative   Standing Status: Future Standing Exp   Date: 07/09/21       PDMP Review     None          ED Provider  Electronically Signed by           Geraldine Henderson PA-C  07/07/21 8116

## 2021-07-03 LAB — BACTERIA UR CULT: NORMAL

## 2021-07-20 ENCOUNTER — HOSPITAL ENCOUNTER (EMERGENCY)
Facility: HOSPITAL | Age: 26
Discharge: HOME | End: 2021-07-20
Attending: EMERGENCY MEDICINE | Admitting: EMERGENCY MEDICINE
Payer: COMMERCIAL

## 2021-07-20 VITALS
RESPIRATION RATE: 14 BRPM | HEART RATE: 69 BPM | SYSTOLIC BLOOD PRESSURE: 105 MMHG | DIASTOLIC BLOOD PRESSURE: 63 MMHG | TEMPERATURE: 98.9 F | OXYGEN SATURATION: 98 % | BODY MASS INDEX: 22.65 KG/M2 | WEIGHT: 115.96 LBS

## 2021-07-20 DIAGNOSIS — R10.9 ABDOMINAL PAIN IN PREGNANCY: Primary | ICD-10-CM

## 2021-07-20 DIAGNOSIS — O26.899 ABDOMINAL PAIN IN PREGNANCY: Primary | ICD-10-CM

## 2021-07-20 LAB
BACTERIA UR QL AUTO: ABNORMAL /HPF
BILIRUB UR QL STRIP: NEGATIVE
CLARITY UR: CLEAR
COLOR UR: YELLOW
GLUCOSE UR STRIP-MCNC: NEGATIVE MG/DL
HGB UR QL STRIP.AUTO: ABNORMAL
KETONES UR STRIP-MCNC: NEGATIVE MG/DL
LEUKOCYTE ESTERASE UR QL STRIP: NEGATIVE
NITRITE UR QL STRIP: NEGATIVE
NON-SQ EPI CELLS URNS QL MICRO: ABNORMAL /HPF
PH UR STRIP.AUTO: 6.5 [PH] (ref 4.5–8)
PROT UR STRIP-MCNC: NEGATIVE MG/DL
RBC #/AREA URNS AUTO: ABNORMAL /HPF
SP GR UR STRIP.AUTO: >=1.03 (ref 1–1.03)
UROBILINOGEN UR QL STRIP.AUTO: 0.2 E.U./DL
WBC #/AREA URNS AUTO: ABNORMAL /HPF

## 2021-07-20 PROCEDURE — 99284 EMERGENCY DEPT VISIT MOD MDM: CPT

## 2021-07-20 PROCEDURE — 99282 EMERGENCY DEPT VISIT SF MDM: CPT | Performed by: EMERGENCY MEDICINE

## 2021-07-20 PROCEDURE — 87086 URINE CULTURE/COLONY COUNT: CPT

## 2021-07-20 PROCEDURE — 76815 OB US LIMITED FETUS(S): CPT | Performed by: EMERGENCY MEDICINE

## 2021-07-20 PROCEDURE — 81001 URINALYSIS AUTO W/SCOPE: CPT

## 2021-07-20 RX ORDER — ONDANSETRON 4 MG/1
4 TABLET, FILM COATED ORAL EVERY 8 HOURS PRN
COMMUNITY
End: 2022-01-26 | Stop reason: HOSPADM

## 2021-07-21 LAB — BACTERIA UR CULT: NORMAL

## 2021-07-21 NOTE — ED PROVIDER NOTES
History  Chief Complaint   Patient presents with    Abdominal Pain     lower abd pain that started at 430pm today  pt is around 11 weeks pregnant  denies vaginal bleeding      Pt is a 31year old  who is 11 weeks gestation presenting with abdominal cramping  Pt states around 3 hours ago she began to have lower abdominal and pelvic cramping  Pain has been constant and non-radiating  Denies fevers, chills, sweats, lightheadedness, dizziness, chest pain, SOB, back pain, n/v/d, urinary symptoms, vaginal bleeding or discharge  Pt has had prior miscarriages  History provided by:  Patient   used: No    Abdominal Pain  Pain location:  Suprapubic, LLQ and RLQ  Pain quality: cramping    Pain radiates to:  Does not radiate  Pain severity:  Moderate  Onset quality:  Gradual  Duration:  3 hours  Timing:  Constant  Progression:  Unchanged  Chronicity:  New  Context: not sick contacts, not suspicious food intake and not trauma    Relieved by:  Nothing  Worsened by:  Nothing  Ineffective treatments:  None tried  Associated symptoms: no constipation, no diarrhea, no nausea and no vomiting    Risk factors: pregnancy        Prior to Admission Medications   Prescriptions Last Dose Informant Patient Reported? Taking? Prenatal Vit-Fe Fumarate-FA (PRENATAL VITAMIN PO)   Yes Yes   Sig: Take by mouth   ondansetron (ZOFRAN) 4 mg tablet   Yes Yes   Sig: Take 4 mg by mouth every 8 (eight) hours as needed for nausea or vomiting      Facility-Administered Medications: None       Past Medical History:   Diagnosis Date    Anemia     Former smoker     Stop smoking 2020    Miscarriage     Varicella     vaccine       Past Surgical History:   Procedure Laterality Date     SECTION      Patient states anxiety attack which affected fetal tracing necessitating       DILATION AND EVACUATION  2016    Elective termination    DILATION AND EVACUATION  2018    Persistent Missed pregnancy  [facility] RX MISSED ABORTN,1ST TRI N/A 12/30/2020    Procedure: DILATATION AND EVACUATION (D&E) ( 8 OF WEEKS); Surgeon: Kamila Frost MD;  Location: [facility];  Service: Gynecology    WISDOM TOOTH EXTRACTION         Family History   Problem Relation Age of Onset    Depression Mother     Hypertension Father     Diabetes Father     Asthma Brother     Hyperlipidemia Brother      I have reviewed and agree with the history as documented  E-Cigarette/Vaping    E-Cigarette Use Never User      E-Cigarette/Vaping Substances    Nicotine No     THC No     CBD No     Flavoring No     Other No     Unknown No      Social History     Tobacco Use    Smoking status: Former Smoker     Types: Cigarettes    Smokeless tobacco: Former User    Tobacco comment: quit 09/2020   Vaping Use    Vaping Use: Never used   Substance Use Topics    Alcohol use: Not Currently    Drug use: No       Review of Systems   Constitutional: Negative  HENT: Negative  Respiratory: Negative  Cardiovascular: Negative  Gastrointestinal: Positive for abdominal pain  Negative for constipation, diarrhea, nausea and vomiting  Genitourinary: Negative  Musculoskeletal: Negative  Neurological: Negative  All other systems reviewed and are negative  Physical Exam  Physical Exam  Constitutional:       General: She is not in acute distress  Appearance: She is well-developed  She is not diaphoretic  HENT:      Head: Normocephalic and atraumatic  Right Ear: External ear normal       Left Ear: External ear normal       Nose: Nose normal    Eyes:      General: No scleral icterus  Right eye: No discharge  Left eye: No discharge  Extraocular Movements: Extraocular movements intact  Conjunctiva/sclera: Conjunctivae normal    Cardiovascular:      Rate and Rhythm: Normal rate and regular rhythm  Heart sounds: Normal heart sounds     Pulmonary:      Effort: Pulmonary effort is normal  Breath sounds: Normal breath sounds  Abdominal:      General: Abdomen is flat  Bowel sounds are normal       Palpations: Abdomen is soft  Tenderness: There is abdominal tenderness in the right lower quadrant, suprapubic area and left lower quadrant  Musculoskeletal:         General: Normal range of motion  Cervical back: Normal range of motion and neck supple  Skin:     General: Skin is warm and dry  Neurological:      General: No focal deficit present  Mental Status: She is alert and oriented to person, place, and time  Mental status is at baseline  Psychiatric:         Mood and Affect: Mood normal          Behavior: Behavior normal          Vital Signs  ED Triage Vitals   Temperature Pulse Respirations Blood Pressure SpO2   07/20/21 1844 07/20/21 1844 07/20/21 1844 07/20/21 1847 07/20/21 1844   98 9 °F (37 2 °C) 69 14 105/63 98 %      Temp Source Heart Rate Source Patient Position - Orthostatic VS BP Location FiO2 (%)   07/20/21 1844 07/20/21 1844 07/20/21 1847 07/20/21 1847 --   Oral Monitor Sitting Right arm       Pain Score       07/20/21 1844       5           Vitals:    07/20/21 1844 07/20/21 1847   BP:  105/63   Pulse: 69    Patient Position - Orthostatic VS:  Sitting         Visual Acuity      ED Medications  Medications - No data to display    Diagnostic Studies  Results Reviewed     Procedure Component Value Units Date/Time    Urine Microscopic [[id number]]  (Abnormal) Collected: 07/20/21 1853    Lab Status: Final result Specimen: Urine, Clean Catch Updated: 07/20/21 1948     RBC, UA None Seen /hpf      WBC, UA 0-1 /hpf      Epithelial Cells Occasional /hpf      Bacteria, UA Occasional /hpf     Urine culture [[id number]] Collected: 07/20/21 1853    Lab Status:  In process Specimen: Urine, Clean Catch Updated: 07/20/21 1916    Urine Macroscopic, POC [[id number]]  (Abnormal) Collected: 07/20/21 1853    Lab Status: Final result Specimen: Urine Updated: 07/20/21 1855     Color, UA Yellow Clarity, UA Clear     pH, UA 6 5     Leukocytes, UA Negative     Nitrite, UA Negative     Protein, UA Negative mg/dl      Glucose, UA Negative mg/dl      Ketones, UA Negative mg/dl      Urobilinogen, UA 0 2 E U /dl      Bilirubin, UA Negative     Blood, UA Trace     Specific Gravity, UA >=1 030    Narrative:      CLINITEK RESULT                 No orders to display              Procedures  POC Pelvic US    Date/Time: 7/20/2021 7:20 PM  Performed by: Torsten Coffey PA-C  Authorized by: Torsten Coffey PA-C     Patient location:  ED  Other Assisting Provider: No    Procedure details:     Exam Type:  Diagnostic    Indications: pregnant with abdominal pain      Assessment for: evaluate fetal viability      Technique:  Transabdominal obstetric (HCG+) exam    Views obtained: uterus (transverse and sagittal)      Image quality: limited diagnostic      Image availability:  Images available in PACS  Uterine findings:     Intrauterine pregnancy: identified      Single gestation: identified      Fetal heart rate: identified      Fetal heart rate (bpm):  173  Interpretation:     Ultrasound impressions: normal      Pregnancy findings: intrauterine pregnancy (IUP)      Estimated gestational age (weeks):  6                 ED Course                             SBIRT 20yo+      Most Recent Value   SBIRT (22 yo +)   In order to provide better care to our patients, we are screening all of our patients for alcohol and drug use  Would it be okay to ask you these screening questions? Yes Filed at: 07/20/2021 1909   Initial Alcohol Screen: US AUDIT-C    1  How often do you have a drink containing alcohol?  0 Filed at: 07/20/2021 1909   2  How many drinks containing alcohol do you have on a typical day you are drinking? 0 Filed at: 07/20/2021 1909   3b  FEMALE Any Age, or MALE 65+: How often do you have 4 or more drinks on one occassion?   0 Filed at: 07/20/2021 1909   Audit-C Score  0 Filed at: 07/20/2021 1909   NED: How many times in the past year have you    Used an illegal drug or used a prescription medication for non-medical reasons? Never Filed at: 07/20/2021 1909                    MDM  Number of Diagnoses or Management Options  Abdominal pain in pregnancy: new and requires workup  Diagnosis management comments: FHT mildly elevated, but good fetal movement on US  Call OB for further instructions  Educated on return precautions  Stable for discharge  I do not suspect acute abdomen  Amount and/or Complexity of Data Reviewed  Tests in the medicine section of CPT®: ordered and reviewed  Independent visualization of images, tracings, or specimens: yes    Risk of Complications, Morbidity, and/or Mortality  Presenting problems: moderate  Diagnostic procedures: moderate  Management options: moderate    Patient Progress  Patient progress: stable      Disposition  Final diagnoses:   Abdominal pain in pregnancy     Time reflects when diagnosis was documented in both MDM as applicable and the Disposition within this note     Time User Action Codes Description Comment    7/20/2021  7:29 PM Yanely George Add [O26 [id number],  R10 9] Abdominal pain in pregnancy       ED Disposition     ED Disposition Condition Date/Time Comment    Discharge Good drake Jul 20, 2021  7:29 PM Lorelei Chester discharge to home/self care              Follow-up Information     Follow up With Specialties Details Why Contact Info Additional [address] [facility] Obstetrics and Gynecology Schedule an appointment as soon as possible for a visit today  [address] [facility] [address]  [address] [place], [place], [address] [phone]          Discharge Medication List as of 7/20/2021  7:29 PM      CONTINUE these medications which have NOT CHANGED    Details   ondansetron (ZOFRAN) 4 mg tablet Take 4 mg by mouth every 8 (eight) hours as needed for nausea or vomiting, Historical Med      Prenatal Vit-Fe Fumarate-FA (PRENATAL VITAMIN PO) Take by mouth, Historical Med           No discharge procedures on file      PDMP Review     None          ED Provider  Electronically Signed by           Pritesh Cordoba PA-C  07/21/21 8688

## 2021-07-22 ENCOUNTER — INITIAL PRENATAL (OUTPATIENT)
Dept: OBGYN CLINIC | Facility: CLINIC | Age: 26
End: 2021-07-22

## 2021-07-22 VITALS
SYSTOLIC BLOOD PRESSURE: 102 MMHG | HEART RATE: 67 BPM | WEIGHT: 113 LBS | BODY MASS INDEX: 22.07 KG/M2 | DIASTOLIC BLOOD PRESSURE: 62 MMHG

## 2021-07-22 DIAGNOSIS — Z3A.11 11 WEEKS GESTATION OF PREGNANCY: ICD-10-CM

## 2021-07-22 DIAGNOSIS — Z34.91 FIRST TRIMESTER PREGNANCY: Primary | ICD-10-CM

## 2021-07-22 DIAGNOSIS — O09.899 HISTORY OF PRETERM DELIVERY, CURRENTLY PREGNANT: ICD-10-CM

## 2021-07-22 DIAGNOSIS — Z98.891 HISTORY OF CESAREAN SECTION: ICD-10-CM

## 2021-07-22 PROBLEM — Z86.19 HISTORY OF HERPES SIMPLEX INFECTION: Status: RESOLVED | Noted: 2021-06-21 | Resolved: 2021-07-22

## 2021-07-22 PROCEDURE — PNV: Performed by: OBSTETRICS & GYNECOLOGY

## 2021-07-22 PROCEDURE — 87591 N.GONORRHOEAE DNA AMP PROB: CPT | Performed by: OBSTETRICS & GYNECOLOGY

## 2021-07-22 PROCEDURE — G0145 SCR C/V CYTO,THINLAYER,RESCR: HCPCS | Performed by: OBSTETRICS & GYNECOLOGY

## 2021-07-22 PROCEDURE — 87491 CHLMYD TRACH DNA AMP PROBE: CPT | Performed by: OBSTETRICS & GYNECOLOGY

## 2021-07-22 NOTE — PATIENT INSTRUCTIONS
Pregnancy at [address] to [address]:   What changes are happening in my body? You are now at the end of your first trimester and entering your second trimester  Morning sickness usually goes away by this time  You may have other symptoms such as fatigue, frequent urination, and headaches  You may have gained 2 to 4 pounds by now  How do I care for myself at this stage of my pregnancy? · Get plenty of rest   You may feel more tired than normal  You may need to take naps or go to bed earlier  · Manage nausea and vomiting  Avoid fatty and spicy foods  Eat small meals throughout the day instead of large meals  Rachel may help to decrease nausea  Ask your healthcare provider about other ways of decreasing nausea and vomiting  · Eat a variety of healthy foods  Healthy foods include fruits, vegetables, whole-grain breads, low-fat dairy foods, beans, lean meats, and fish  Drink liquids as directed  Ask how much liquid to drink each day and which liquids are best for you  Limit caffeine to less than 200 milligrams each day  Limit your intake of fish to 2 servings each week  Choose fish low in mercury such as canned light tuna, shrimp, salmon, cod, or tilapia  Do not  eat fish high in mercury such as swordfish, tilefish, logan mackerel, and shark  · Take prenatal vitamins as directed  Your need for certain vitamins and minerals, such as folic acid, increases during pregnancy  Prenatal vitamins provide some of the extra vitamins and minerals you need  Prenatal vitamins may also help to decrease the risk of certain birth defects  · Do not smoke  Smoking increases your risk of a miscarriage and other health problems during your pregnancy  Smoking can cause your baby to be born too early or weigh less at birth  Ask your healthcare provider for information if you need help quitting  · Do not drink alcohol  Alcohol passes from your body to your baby through the placenta   It can affect your baby's brain development and cause fetal alcohol syndrome (FAS)  FAS is a group of conditions that causes mental, behavior, and growth problems  · Talk to your healthcare provider before you take any medicines  Many medicines may harm your baby if you take them when you are pregnant  Do not take any medicines, vitamins, herbs, or supplements without first talking to your healthcare provider  Never use illegal or street drugs (such as marijuana or cocaine) while you are pregnant  What are some safety tips during pregnancy? · Avoid hot tubs and saunas  Do not use a hot tub or sauna while you are pregnant, especially during your first trimester  Hot tubs and saunas may raise your baby's temperature and increase the risk of birth defects  · Avoid toxoplasmosis  This is an infection caused by eating raw meat or being around infected cat feces  It can cause birth defects, miscarriages, and other problems  Wash your hands after you touch raw meat  Make sure any meat is well-cooked before you eat it  Avoid raw eggs and unpasteurized milk  Use gloves or ask someone else to clean your cat's litter box while you are pregnant  What changes are happening with my baby? Your baby has fully formed fingernails and toenails  Your baby's heartbeat can now be heard  Ask your healthcare provider if you can listen to your baby's heartbeat  By week 14, your baby is over 4 inches long from the top of the head to the rump (baby's bottom)  Your baby weighs over 3 ounces  What do I need to know about prenatal care? Prenatal care is a series of visits with your healthcare provider throughout your pregnancy  During the first 28 weeks of your pregnancy, you will see your healthcare provider 1 time each month  Prenatal care can help prevent problems during pregnancy and childbirth  Your healthcare provider will check your blood pressure and weight  Your baby's heart rate will also be checked   You may also need the following at some visits:  · A pelvic exam  allows your healthcare provider to see your cervix (the bottom part of your uterus)  Your healthcare provider will use a speculum to open your vagina  He or she will check the size and shape of your uterus  · Blood tests  may be done to check for any of the following:     ? Gestational diabetes or anemia (low iron level)    ? Blood type or Rh factor, or certain birth defects    ? Immunity to certain diseases, such as chickenpox or rubella    ? An infection, such as a sexually transmitted infection, HIV, or hepatitis B    · Hepatitis B  may need to be prevented or treated  Hepatitis B is inflammation of the liver caused by the hepatitis B virus (HBV)  HBV can spread from a mother to her baby during delivery  You will be checked for HBV as early as possible in the first trimester of each pregnancy  You need the test even if you received the hepatitis B vaccine or were tested before  You may need to have an HBV infection treated before you give birth  · Urine tests  may also be done to check for sugar and protein  These can be signs of gestational diabetes or preeclampsia  Urine tests may also be done to check for signs of infection  · A fetal ultrasound  shows pictures of your baby inside your uterus  The pictures are used to check your baby's development, movement, and position  · Genetic disorder screening tests  may be offered to you  These tests check your baby's risk for genetic disorders such as Down syndrome  A screening test includes a blood test and ultrasound  When should I seek immediate care? · You have pain or cramping in your abdomen or low back  · You have heavy vaginal bleeding or clotting  · You pass material that looks like tissue or large clots  Collect the material and bring it with you  When should I call my doctor or obstetrician? · You cannot keep food or drinks down, and you are losing weight      · You have light bleeding  · You have chills or a fever  · You have vaginal itching, burning, or pain  · You have yellow, green, white, or foul-smelling vaginal discharge  · You have pain or burning when you urinate, less urine than usual, or pink or bloody urine  · You have questions or concerns about your condition or care  CARE AGREEMENT:   You have the right to help plan your care  Learn about your health condition and how it may be treated  Discuss treatment options with your healthcare providers to decide what care you want to receive  You always have the right to refuse treatment  The above information is an  only  It is not intended as medical advice for individual conditions or treatments  Talk to your doctor, nurse or pharmacist before following any medical regimen to see if it is safe and effective for you  © Copyright [employer] 2021 Information is for End User's use only and may not be sold, redistributed or otherwise used for commercial purposes   All illustrations and images included in CareNotes® are the copyrighted property of A D A M , Inc  or [address] [place] [address]

## 2021-07-22 NOTE — PROGRESS NOTES
Patient was seen today for 1st prenatal visit  1  11 5 weeks-good fetal heart tones noted  Threatened AB precautions were reviewed  Patient was seen in the ER on 21 with cramping, normal findings were noted  Exam is normal today  Pap with reflex HPV and GC/chlamydia done today  Has [facility] appointment for nuchal fold ultrasound in about 10 days time  Briefly counseled about genetics, she will consider this  Follow-up 4 weeks time for prenatal visit with ultrasound to check cervical length  Counseled about Tdap, flu vaccine, and COVID vaccine  She did have some questions about COVID vaccine and they were answered in detail  Strongly suggested she consider this  2  History of  delivery x2- at 34 weeks after slow leak  She then had  at 35 weeks, emergency  for bradycardia for suspected abruption at [facility]   Both babies are doing well  Discussed Priti and the controversy regarding it  Could consider Priti weekly after 16 weeks  She will consider this  3  Prior vaginal delivery/prior [id number] trial of labor  Risks and benefits of procedure were discussed  Particularly, very small risk of uterine rupture less than 1% was discussed with potential adverse sequelae  She strongly wishes to proceed with trial of labor  4  Rh positive  5  Abdominal cramping-resolved  6  Other-HSV history is documented in the [facility] chart  Patient adamantly denies any history of herpes

## 2021-07-27 LAB
C TRACH DNA SPEC QL NAA+PROBE: NEGATIVE
N GONORRHOEA DNA SPEC QL NAA+PROBE: NEGATIVE

## 2021-07-29 ENCOUNTER — TELEPHONE (OUTPATIENT)
Dept: PERINATAL CARE | Facility: CLINIC | Age: 26
End: 2021-07-29

## 2021-07-29 LAB
LAB AP GYN PRIMARY INTERPRETATION: NORMAL
Lab: NORMAL

## 2021-07-29 NOTE — TELEPHONE ENCOUNTER
Phone call to patient and confirmed MFM NT US appt  Explained [facility] sent an important message via [facility]  Message contains a video link that will explain genetic testing verus screening and information on how to check insurance coverage for specialty genetic labs  Patient requested address for [facility] office- gave GPS address [facility]   Patient reports she will check information sent

## 2021-08-02 ENCOUNTER — ROUTINE PRENATAL (OUTPATIENT)
Dept: PERINATAL CARE | Facility: OTHER | Age: 26
End: 2021-08-02
Payer: COMMERCIAL

## 2021-08-02 VITALS
BODY MASS INDEX: 22.97 KG/M2 | SYSTOLIC BLOOD PRESSURE: 101 MMHG | HEART RATE: 82 BPM | HEIGHT: 60 IN | WEIGHT: 117 LBS | DIASTOLIC BLOOD PRESSURE: 72 MMHG

## 2021-08-02 DIAGNOSIS — Z36.82 ENCOUNTER FOR (NT) NUCHAL TRANSLUCENCY SCAN: ICD-10-CM

## 2021-08-02 DIAGNOSIS — O34.219 HISTORY OF CESAREAN DELIVERY, ANTEPARTUM: ICD-10-CM

## 2021-08-02 DIAGNOSIS — O09.899 HISTORY OF PRETERM DELIVERY, CURRENTLY PREGNANT: ICD-10-CM

## 2021-08-02 DIAGNOSIS — Z3A.13 13 WEEKS GESTATION OF PREGNANCY: Primary | ICD-10-CM

## 2021-08-02 PROBLEM — Z87.59 HISTORY OF MISCARRIAGE: Status: RESOLVED | Noted: 2021-06-08 | Resolved: 2021-08-02

## 2021-08-02 PROCEDURE — 76813 OB US NUCHAL MEAS 1 GEST: CPT | Performed by: OBSTETRICS & GYNECOLOGY

## 2021-08-02 PROCEDURE — 76801 OB US < 14 WKS SINGLE FETUS: CPT | Performed by: OBSTETRICS & GYNECOLOGY

## 2021-08-02 PROCEDURE — 99242 OFF/OP CONSLTJ NEW/EST SF 20: CPT | Performed by: OBSTETRICS & GYNECOLOGY

## 2021-08-02 NOTE — LETTER
2021     Esteban Erazo, [address] [facility] [address]    Patient: Donal Fabry   YOB: 1995   Date of Visit: 2021       Dear Dr Hanh Aguero: Thank you for referring Stacy Spicer to me for evaluation  Below are my notes for this consultation  If you have questions, please do not hesitate to call me  I look forward to following your patient along with you  Sincerely,        Paola Tejada MD        CC: No Recipients  Paola Tejada MD  2021  5:23 PM  Sign when Signing Visit  [address]: Ms Joaquina Norris was seen today at 13w2d for nuchal translucency ultrasound  See ultrasound report under "OB Procedures" tab  My recommendations are as follows:  1  We reviewed the availability of genetic screening, as well as diagnostic testing, which are available to all pregnant women  We reviewed limitations, risks, and benefits of screening and testing  She does not wish to pursue aneuploidy screening or diagnostic testing at this time  MSAFP screening should be ordered through your office at 16-18 weeks gestation, and completed prior to fetal anatomic survey  A detailed anatomic survey as well as transvaginal cervical length screening are recommended between 18-22 weeks gestation  2  We reviewed her history of spontaneous  delivery, which increases her risk for  birth in this pregnancy  In women with a history of spontaneous  birth, treatment with progesterone therapy from 16-36 weeks gestation may reduce recurrence risk  Studies evaluating the effectiveness of progesterone therapy in reducing recurrence risk for  delivery have produced conflicting results regarding effectiveness, but have not suggested  harm with treatment  Progesterone can either be given as a weekly injection (IM or SQ) of 17-OH progesterone caproate, or as a vaginal suppository of 200mg daily   Either route of progesterone is a reasonable option endorsed by the Society for Maternal-Fetal Medicine  Serial cervical surveillance (by transvaginal sonography) is helpful in predicting risk of  delivery  Measurement of transvaginal cervical length is recommended between 16 0/7 and 23 6/7 weeks gestation  Cervical length equal to or less than 25mm before 24 weeks gestation would prompt recommendation for a transvaginal ultrasound-indicated cerclage  In women with a history of spontaneous  delivery, approximately 1/3 will require cerclage placement, and 2/3 will have normal cervical length screening and need progesterone therapy alone  Surveillance for signs and symptoms of  labor and rupture of membranes are recommended during the pregnancy  After our discussion, she plans to pursue vaginal progesterone (which I prescribed to her) and serial cervical surveillance (she has this scheduled with [facility]      Please don't hesitate to contact our office with any concerns or questions     -Loretta García MD

## 2021-08-02 NOTE — PROGRESS NOTES
[address]: Ms Jamey Mcdonald was seen today at 13w2d for nuchal translucency ultrasound  See ultrasound report under "OB Procedures" tab  My recommendations are as follows:  1  We reviewed the availability of genetic screening, as well as diagnostic testing, which are available to all pregnant women  We reviewed limitations, risks, and benefits of screening and testing  She does not wish to pursue aneuploidy screening or diagnostic testing at this time  MSAFP screening should be ordered through your office at 16-18 weeks gestation, and completed prior to fetal anatomic survey  A detailed anatomic survey as well as transvaginal cervical length screening are recommended between 18-22 weeks gestation  2  We reviewed her history of spontaneous  delivery, which increases her risk for  birth in this pregnancy  In women with a history of spontaneous  birth, treatment with progesterone therapy from 16-36 weeks gestation may reduce recurrence risk  Studies evaluating the effectiveness of progesterone therapy in reducing recurrence risk for  delivery have produced conflicting results regarding effectiveness, but have not suggested  harm with treatment  Progesterone can either be given as a weekly injection (IM or SQ) of 17-OH progesterone caproate, or as a vaginal suppository of 200mg daily  Either route of progesterone is a reasonable option endorsed by the Society for Maternal-Fetal Medicine  Serial cervical surveillance (by transvaginal sonography) is helpful in predicting risk of  delivery  Measurement of transvaginal cervical length is recommended between 16 0/7 and 23 6/7 weeks gestation  Cervical length equal to or less than 25mm before 24 weeks gestation would prompt recommendation for a transvaginal ultrasound-indicated cerclage   In women with a history of spontaneous  delivery, approximately 1/3 will require cerclage placement, and 2/3 will have normal cervical length screening and need progesterone therapy alone  Surveillance for signs and symptoms of  labor and rupture of membranes are recommended during the pregnancy  After our discussion, she plans to pursue vaginal progesterone (which I prescribed to her) and serial cervical surveillance (she has this scheduled with [address] [place] [address] ([facility]))      Please don't hesitate to contact our office with any concerns or questions     -Darin Robbins MD

## 2021-08-16 ENCOUNTER — HOSPITAL ENCOUNTER (EMERGENCY)
Facility: HOSPITAL | Age: 26
Discharge: HOME | End: 2021-08-16
Attending: EMERGENCY MEDICINE | Admitting: EMERGENCY MEDICINE
Payer: COMMERCIAL

## 2021-08-16 VITALS
DIASTOLIC BLOOD PRESSURE: 55 MMHG | SYSTOLIC BLOOD PRESSURE: 102 MMHG | HEIGHT: 60 IN | OXYGEN SATURATION: 100 % | HEART RATE: 76 BPM | BODY MASS INDEX: 22.72 KG/M2 | RESPIRATION RATE: 16 BRPM | WEIGHT: 115.74 LBS | TEMPERATURE: 98 F

## 2021-08-16 DIAGNOSIS — R51.9 HEADACHE: ICD-10-CM

## 2021-08-16 DIAGNOSIS — O21.9 NAUSEA AND VOMITING IN PREGNANCY: Primary | ICD-10-CM

## 2021-08-16 LAB
BILIRUB UR QL STRIP: NEGATIVE
CLARITY UR: CLEAR
COLOR UR: YELLOW
GLUCOSE UR STRIP-MCNC: NEGATIVE MG/DL
HGB UR QL STRIP.AUTO: NEGATIVE
KETONES UR STRIP-MCNC: ABNORMAL MG/DL
LEUKOCYTE ESTERASE UR QL STRIP: NEGATIVE
NITRITE UR QL STRIP: NEGATIVE
PH UR STRIP.AUTO: 7 [PH] (ref 4.5–8)
PROT UR STRIP-MCNC: NEGATIVE MG/DL
SP GR UR STRIP.AUTO: 1.02 (ref 1–1.03)
UROBILINOGEN UR QL STRIP.AUTO: 1 E.U./DL

## 2021-08-16 PROCEDURE — 96361 HYDRATE IV INFUSION ADD-ON: CPT

## 2021-08-16 PROCEDURE — 81003 URINALYSIS AUTO W/O SCOPE: CPT

## 2021-08-16 PROCEDURE — 99283 EMERGENCY DEPT VISIT LOW MDM: CPT

## 2021-08-16 PROCEDURE — 99284 EMERGENCY DEPT VISIT MOD MDM: CPT | Performed by: EMERGENCY MEDICINE

## 2021-08-16 PROCEDURE — 87086 URINE CULTURE/COLONY COUNT: CPT

## 2021-08-16 PROCEDURE — 96374 THER/PROPH/DIAG INJ IV PUSH: CPT

## 2021-08-16 RX ORDER — METOCLOPRAMIDE HYDROCHLORIDE 5 MG/ML
10 INJECTION INTRAMUSCULAR; INTRAVENOUS ONCE
Status: COMPLETED | OUTPATIENT
Start: 2021-08-16 | End: 2021-08-16

## 2021-08-16 RX ORDER — METOCLOPRAMIDE 10 MG/1
10 TABLET ORAL EVERY 6 HOURS
Qty: 20 TABLET | Refills: 0 | Status: SHIPPED | OUTPATIENT
Start: 2021-08-16 | End: 2022-01-26 | Stop reason: HOSPADM

## 2021-08-16 RX ADMIN — METOCLOPRAMIDE 10 MG: 5 INJECTION, SOLUTION INTRAMUSCULAR; INTRAVENOUS at 10:45

## 2021-08-16 RX ADMIN — SODIUM CHLORIDE 1000 ML: 0.9 INJECTION, SOLUTION INTRAVENOUS at 10:43

## 2021-08-16 NOTE — ED PROVIDER NOTES
History  Chief Complaint   Patient presents with    Vomiting     headache and vomiting began yesterday, 15 weeks pregnant    Headache     31 y o  F at approx 15 weeks pregnant p/w N/V x yesterday  Associated with gradual onset of tension at b/l temples  Denies neck pain/stiffness, photophobia, changes in vision, URI complaints, abd pain, vaginal bleeding/leaking, numbness/tingling, focal deficits  History provided by:  Patient   used: No    Vomiting  Duration:  1 day  Chronicity:  New  Relieved by:  Nothing  Worsened by:  Nothing  Ineffective treatments:  None tried  Associated symptoms: headaches    Associated symptoms: no abdominal pain, no diarrhea, no fever, no myalgias, no sore throat and no URI    Risk factors: pregnant    Headache  Associated symptoms: nausea and vomiting    Associated symptoms: no abdominal pain, no diarrhea, no fever, no myalgias, no numbness, no sore throat, no URI and no weakness        Prior to Admission Medications   Prescriptions Last Dose Informant Patient Reported? Taking? Prenatal Vit-Fe Fumarate-FA (PRENATAL VITAMIN PO)  Self Yes No   Sig: Take by mouth   ondansetron (ZOFRAN) 4 mg tablet  Self Yes No   Sig: Take 4 mg by mouth every 8 (eight) hours as needed for nausea or vomiting   progesterone 200 mg vaginal suppository   No No   Sig: Insert 1 suppository (200 mg total) into the vagina daily at bedtime Begin at 16 weeks gestation, continue until 36 weeks gestation      Facility-Administered Medications: None       Past Medical History:   Diagnosis Date    Anemia     Former smoker     Stop smoking 2020    Miscarriage     Varicella     vaccine       Past Surgical History:   Procedure Laterality Date     SECTION      Patient states anxiety attack which affected fetal tracing necessitating       DILATION AND EVACUATION  2016    Elective termination    DILATION AND EVACUATION  2018    Persistent Missed pregnancy    VT SURG RX MISSED ABORTN,1ST TRI N/A 12/30/2020    Procedure: DILATATION AND EVACUATION (D&E) ( 8 OF WEEKS); Surgeon: Violet Griggs MD;  Location: [facility];  Service: Gynecology    WISDOM TOOTH EXTRACTION         Family History   Problem Relation Age of Onset    Depression Mother     Hypertension Father     Diabetes Father     Asthma Brother     Hyperlipidemia Brother      I have reviewed and agree with the history as documented  E-Cigarette/Vaping    E-Cigarette Use Never User      E-Cigarette/Vaping Substances    Nicotine No     THC No     CBD No     Flavoring No     Other No     Unknown No      Social History     Tobacco Use    Smoking status: Former Smoker     Types: Cigarettes    Smokeless tobacco: Former User    Tobacco comment: quit 09/2020   Vaping Use    Vaping Use: Never used   Substance Use Topics    Alcohol use: Not Currently    Drug use: No       Review of Systems   Constitutional: Negative for fever  HENT: Negative for sore throat  Gastrointestinal: Positive for nausea and vomiting  Negative for abdominal pain and diarrhea  Musculoskeletal: Negative for myalgias  Neurological: Positive for headaches  Negative for weakness and numbness  All other systems reviewed and are negative  Physical Exam  Physical Exam  Vitals and nursing note reviewed  Constitutional:       General: She is not in acute distress  Appearance: She is well-developed  She is not ill-appearing, toxic-appearing or diaphoretic  HENT:      Head: Normocephalic and atraumatic  Right Ear: Tympanic membrane normal       Left Ear: Tympanic membrane normal    Eyes:      Extraocular Movements: Extraocular movements intact  Conjunctiva/sclera: Conjunctivae normal       Pupils: Pupils are equal, round, and reactive to light  Cardiovascular:      Rate and Rhythm: Normal rate and regular rhythm  Heart sounds: Normal heart sounds  No murmur heard  No friction rub     Pulmonary: Effort: Pulmonary effort is normal  No accessory muscle usage or respiratory distress  Breath sounds: Normal breath sounds  No stridor  No wheezing, rhonchi or rales  Abdominal:      General: There is no distension  Palpations: Abdomen is soft  Tenderness: There is no abdominal tenderness  There is no guarding or rebound  Musculoskeletal:      Cervical back: Full passive range of motion without pain, normal range of motion and neck supple  No rigidity  Lymphadenopathy:      Cervical: No cervical adenopathy  Skin:     General: Skin is warm and dry  Coloration: Skin is not pale  Findings: No ecchymosis, petechiae or rash  Neurological:      Mental Status: She is alert and oriented to person, place, and time  GCS: GCS eye subscore is 4  GCS verbal subscore is 5  GCS motor subscore is 6  Cranial Nerves: No cranial nerve deficit or dysarthria  Sensory: No sensory deficit  Motor: Motor function is intact  No seizure activity  Gait: Gait normal    Psychiatric:         Behavior: Behavior normal          Vital Signs  ED Triage Vitals [08/16/21 0952]   Temperature Pulse Respirations Blood Pressure SpO2   98 °F (36 7 °C) 75 19 101/58 98 %      Temp Source Heart Rate Source Patient Position - Orthostatic VS BP Location FiO2 (%)   Oral Monitor -- Right arm --      Pain Score       3           Vitals:    08/16/21 0952   BP: 101/58   Pulse: 75         Visual Acuity      ED Medications  Medications   sodium chloride 0 9 % bolus 1,000 mL (1,000 mL Intravenous New Bag 8/16/21 1043)   metoclopramide (REGLAN) injection 10 mg (10 mg Intravenous Given 8/16/21 1045)       Diagnostic Studies  Results Reviewed     Procedure Component Value Units Date/Time    Urine culture [[id number]] Collected: 08/16/21 1147    Lab Status:  In process Specimen: Urine, Clean Catch Updated: 08/16/21 1152    Urine Macroscopic, POC [[id number]]  (Abnormal) Collected: 08/16/21 1147    Lab Status: Final result Specimen: Urine Updated: 08/16/21 1148     Color, UA Yellow     Clarity, UA Clear     pH, UA 7 0     Leukocytes, UA Negative     Nitrite, UA Negative     Protein, UA Negative mg/dl      Glucose, UA Negative mg/dl      Ketones, UA 40 (2+) mg/dl      Urobilinogen, UA 1 0 E U /dl      Bilirubin, UA Negative     Blood, UA Negative     Specific Gravity, UA 1 025    Narrative:      CLINITEK RESULT                 No orders to display              Procedures  Procedures         ED Course  ED Course as of Aug 16 1158   Mon Aug 16, 2021   1045 Pt given Reglan  46 Pt states she feels "much better "  Pt providing urine sample now  SBIRT 20yo+      Most Recent Value   SBIRT (24 yo +)   In order to provide better care to our patients, we are screening all of our patients for alcohol and drug use  Would it be okay to ask you these screening questions? No Filed at: 08/16/2021 1047   Initial Alcohol Screen: US AUDIT-C    1  How often do you have a drink containing alcohol?  0 Filed at: 08/16/2021 1047   3a  Male UNDER 65: How often do you have five or more drinks on one occasion? 0 Filed at: 08/16/2021 1047   3b  FEMALE Any Age, or MALE 65+: How often do you have 4 or more drinks on one occassion? 0 Filed at: 08/16/2021 1047   Audit-C Score  0 Filed at: 08/16/2021 1047   NED: How many times in the past year have you    Used an illegal drug or used a prescription medication for non-medical reasons?   Never Filed at: 08/16/2021 1047                    MDM    Disposition  Final diagnoses:   Nausea and vomiting in pregnancy   Headache     Time reflects when diagnosis was documented in both MDM as applicable and the Disposition within this note     Time User Action Codes Description Comment    8/16/2021 11:35 AM Malka Ray [O21 9] Nausea and vomiting in pregnancy     8/16/2021 11:35 AM Malka Ray [R51 9] Headache       ED Disposition     ED Disposition Condition Date/Time Comment    Discharge Stable Mon Aug 16, 2021 11:56 AM Robert Matias discharge to home/self care  Follow-up Information    None         Patient's Medications   Discharge Prescriptions    METOCLOPRAMIDE (REGLAN) 10 MG TABLET    Take 1 tablet (10 mg total) by mouth every 6 (six) hours       Start Date: 8/16/2021 End Date: --       Order Dose: 10 mg       Quantity: 20 tablet    Refills: 0     No discharge procedures on file      PDMP Review     None          ED Provider  Electronically Signed by           Benjamín Watson DO  08/16/21 1155

## 2021-08-16 NOTE — ED NOTES
Patient given specimen cup for urine sample; unable to void at this time          Sophia Pennington RN  08/16/21 5957

## 2021-08-17 LAB — BACTERIA UR CULT: NORMAL

## 2021-08-26 ENCOUNTER — ROUTINE PRENATAL (OUTPATIENT)
Dept: OBGYN CLINIC | Facility: CLINIC | Age: 26
End: 2021-08-26
Payer: COMMERCIAL

## 2021-08-26 ENCOUNTER — ULTRASOUND (OUTPATIENT)
Dept: OBGYN CLINIC | Facility: CLINIC | Age: 26
End: 2021-08-26
Payer: COMMERCIAL

## 2021-08-26 VITALS
SYSTOLIC BLOOD PRESSURE: 112 MMHG | BODY MASS INDEX: 22.26 KG/M2 | HEART RATE: 65 BPM | WEIGHT: 114 LBS | DIASTOLIC BLOOD PRESSURE: 78 MMHG

## 2021-08-26 DIAGNOSIS — O09.219 PREVIOUS PRETERM DELIVERY, ANTEPARTUM: Primary | ICD-10-CM

## 2021-08-26 DIAGNOSIS — O09.899 HISTORY OF PRETERM DELIVERY, CURRENTLY PREGNANT: ICD-10-CM

## 2021-08-26 DIAGNOSIS — Z34.92 SECOND TRIMESTER PREGNANCY: Primary | ICD-10-CM

## 2021-08-26 PROCEDURE — 76817 TRANSVAGINAL US OBSTETRIC: CPT | Performed by: OBSTETRICS & GYNECOLOGY

## 2021-08-26 PROCEDURE — PNV: Performed by: OBSTETRICS & GYNECOLOGY

## 2021-08-26 RX ORDER — PROGESTERONE 200 MG/1
1 CAPSULE ORAL DAILY
Qty: 30 CAPSULE | Refills: 4 | Status: SHIPPED | OUTPATIENT
Start: 2021-08-26 | End: 2021-09-23

## 2021-08-26 NOTE — PROGRESS NOTES
She was seen today for prenatal visit  1  16 5 weeks-good fetal heart tones noted  She denies any threatened AB precautions  Patient had nuchal fold ultrasound, denies genetic blood work  AFP test was given today, recommend between 16-22 weeks  Counseled previously about COVID vaccine, flu shot, and Tdap  Follow-up 4 weeks time prenatal visit  2  Previous history of  delivery- at 34 weeks after slow leak  Then had  at 29 weeks, emergency  for bradycardia suspected abruption  Patient did have some kind of panic episode at that time as well  Cervical length on ultrasound today 35 mm  She is interested in progesterone  She was counseled about both myself and Dr Rena Jovel about options  Katikaris Baird is under FDA review  She is interested in starting vaginal progesterone  Electronic prescription was sent to the pharmacy for Prometrium 200 mg capsule to place per vagina nightly  Suggested she check the cost of this and we can try to make alterations accordingly to get the appropriate dose  3  Prior vaginal delivery/prior I-[id number] trial of labor  Counseled previous about risk of uterine rupture  4  Rh positive  5  Previous abdominal cramping-none noted today

## 2021-08-26 NOTE — PATIENT INSTRUCTIONS
Pregnancy at 15 to 18 Weeks   [address]:   What changes are happening in my body? Now that you are in your second trimester, you have more energy  You may also feel hungrier than usual  You may start to experience other symptoms, such as heartburn or dizziness  You may be gaining about ½ to 1 pound a week, and your pregnancy is beginning to show  You may need to start wearing maternity clothes  How do I care for myself at this stage of my pregnancy? · Manage heartburn  by eating 4 or 5 small meals each day instead of large meals  Avoid spicy foods  Avoid eating right before bedtime  · Manage nausea and vomiting  Avoid fatty and spicy foods  Eat small meals throughout the day instead of large meals  Rachel may help to decrease nausea  Ask your healthcare provider about other ways of decreasing nausea and vomiting  · Eat a variety of healthy foods  Healthy foods include fruits, vegetables, whole-grain breads, low-fat dairy foods, beans, lean meats, and fish  Drink liquids as directed  Ask how much liquid to drink each day and which liquids are best for you  Limit caffeine to less than 200 milligrams each day  Limit your intake of fish to 2 servings each week  Choose fish low in mercury such as canned light tuna, shrimp, salmon, cod, or tilapia  Do not  eat fish high in mercury such as swordfish, tilefish, logan mackerel, and shark  · Take prenatal vitamins as directed  Your need for certain vitamins and minerals, such as folic acid, increases during pregnancy  Prenatal vitamins provide some of the extra vitamins and minerals you need  Prenatal vitamins may also help to decrease the risk of certain birth defects  · Do not smoke  Smoking increases your risk of a miscarriage and other health problems during your pregnancy  Smoking can cause your baby to be born too early or weigh less at birth  Ask your healthcare provider for information if you need help quitting      · Do not drink alcohol  Alcohol passes from your body to your baby through the placenta  It can affect your baby's brain development and cause fetal alcohol syndrome (FAS)  FAS is a group of conditions that causes mental, behavior, and growth problems  · Talk to your healthcare provider before you take any medicines  Many medicines may harm your baby if you take them when you are pregnant  Do not take any medicines, vitamins, herbs, or supplements without first talking to your healthcare provider  Never use illegal or street drugs (such as marijuana or cocaine) while you are pregnant  What are some safety tips during pregnancy? · Avoid hot tubs and saunas  Do not use a hot tub or sauna while you are pregnant, especially during your first trimester  Hot tubs and saunas may raise your baby's temperature and increase the risk of birth defects  · Avoid toxoplasmosis  This is an infection caused by eating raw meat or being around infected cat feces  It can cause birth defects, miscarriages, and other problems  Wash your hands after you touch raw meat  Make sure any meat is well-cooked before you eat it  Avoid raw eggs and unpasteurized milk  Use gloves or ask someone else to clean your cat's litter box while you are pregnant  What changes are happening with my baby? By 18 weeks, your baby may be about 6 inches long from the top of the head to the rump (baby's bottom)  Your baby may weigh about 11 ounces  You may be able to feel your baby's movement at about 18 weeks or later  The first movements may not be that noticeable  They may feel like a fluttering sensation  Your baby also makes sucking movements and can hear certain sounds  What do I need to know about prenatal care? During the first 28 weeks of your pregnancy, you will see your healthcare provider once a month  Your healthcare provider will check your blood pressure and weight   You may also need any of the following:  · A urine test  may also be done to check for sugar and protein  These can be signs of gestational diabetes or infection  · A blood test  may be done to check for anemia (low iron level)  · Fundal height check  is a measurement of your uterus to check your baby's growth  This number is usually the same as the number of weeks that you have been pregnant  · An ultrasound  may be done to check your baby's development  Your healthcare provider may be able to tell you what your baby's gender is during the ultrasound  · Your baby's heart rate  will be checked  When should I seek immediate care? · You have pain or cramping in your abdomen or low back  · You have heavy vaginal bleeding or clotting  · You pass material that looks like tissue or large clots  Collect the material and bring it with you  When should I contact my healthcare provider? · You cannot keep food or drinks down, and you are losing weight  · You have light bleeding  · You have chills or a fever  · You have vaginal itching, burning, or pain  · You have yellow, green, white, or foul-smelling vaginal discharge  · You have pain or burning when you urinate, less urine than usual, or pink or bloody urine  · You have questions or concerns about your condition or care  CARE AGREEMENT:   You have the right to help plan your care  Learn about your health condition and how it may be treated  Discuss treatment options with your healthcare providers to decide what care you want to receive  You always have the right to refuse treatment  The above information is an  only  It is not intended as medical advice for individual conditions or treatments  Talk to your doctor, nurse or pharmacist before following any medical regimen to see if it is safe and effective for you  © Copyright [employer] 2021 Information is for End User's use only and may not be sold, redistributed or otherwise used for commercial purposes   All illustrations and images included in CareNotes® are the copyrighted property of A D A M , Inc  or Drea Riley

## 2021-09-09 ENCOUNTER — ULTRASOUND (OUTPATIENT)
Dept: OBGYN CLINIC | Facility: CLINIC | Age: 26
End: 2021-09-09
Payer: COMMERCIAL

## 2021-09-09 DIAGNOSIS — N88.3 CERVICAL INCOMPETENCE: Primary | ICD-10-CM

## 2021-09-09 PROCEDURE — 76817 TRANSVAGINAL US OBSTETRIC: CPT | Performed by: OBSTETRICS & GYNECOLOGY

## 2021-09-21 ENCOUNTER — NURSE TRIAGE (OUTPATIENT)
Dept: OTHER | Facility: OTHER | Age: 26
End: 2021-09-21

## 2021-09-21 NOTE — TELEPHONE ENCOUNTER
1  Were you within 6 feet or less, for up to 15 minutes or more with a person that has a confirmed COVID-19 test?   Denied  2  What was the date of your exposure? N/A   3  Are you experiencing any symptoms attributed to the virus?  (Assess for SOB, cough, fever, difficulty breathing)   Asymptomatic  4  HIGH RISK: Do you have any history heart or lung conditions, weakened immune system, diabetes, Asthma, CHF, HIV, COPD, Chemo, renal failure, sickle cell, etc?  Denied  5  PREGNANCY: Are you pregnant or did you recently give birth?   5 months pregnant

## 2021-09-21 NOTE — TELEPHONE ENCOUNTER
Reason for Disposition   [1] Caller concerned that exposure to COVID-19 occurred BUT [2] does not meet COVID-19 EXPOSURE criteria from [facility] [place]    Protocols used: CORONAVIRUS (COVID-19) EXPOSURE-ADULT-[place]

## 2021-09-23 ENCOUNTER — ROUTINE PRENATAL (OUTPATIENT)
Dept: PERINATAL CARE | Facility: OTHER | Age: 26
End: 2021-09-23
Payer: COMMERCIAL

## 2021-09-23 VITALS
BODY MASS INDEX: 23.25 KG/M2 | DIASTOLIC BLOOD PRESSURE: 64 MMHG | WEIGHT: 118.4 LBS | HEART RATE: 110 BPM | HEIGHT: 60 IN | SYSTOLIC BLOOD PRESSURE: 94 MMHG

## 2021-09-23 DIAGNOSIS — Z3A.20 20 WEEKS GESTATION OF PREGNANCY: Primary | ICD-10-CM

## 2021-09-23 DIAGNOSIS — Z36.89 ENCOUNTER FOR FETAL ANATOMIC SURVEY: ICD-10-CM

## 2021-09-23 DIAGNOSIS — Z34.92 SECOND TRIMESTER PREGNANCY: ICD-10-CM

## 2021-09-23 DIAGNOSIS — Z36.86 ENCOUNTER FOR ANTENATAL SCREENING FOR CERVICAL LENGTH: ICD-10-CM

## 2021-09-23 DIAGNOSIS — O09.899 HISTORY OF PRETERM DELIVERY, CURRENTLY PREGNANT: ICD-10-CM

## 2021-09-23 PROBLEM — Z98.890 S/P D&C (STATUS POST DILATION AND CURETTAGE): Status: RESOLVED | Noted: 2020-12-30 | Resolved: 2021-09-23

## 2021-09-23 PROCEDURE — 76817 TRANSVAGINAL US OBSTETRIC: CPT | Performed by: OBSTETRICS & GYNECOLOGY

## 2021-09-23 PROCEDURE — 76805 OB US >/= 14 WKS SNGL FETUS: CPT | Performed by: OBSTETRICS & GYNECOLOGY

## 2021-09-23 PROCEDURE — 99213 OFFICE O/P EST LOW 20 MIN: CPT | Performed by: OBSTETRICS & GYNECOLOGY

## 2021-09-23 NOTE — PATIENT INSTRUCTIONS
If you have any trouble filling progesterone at [facility], please call [facility] to see if they have your prescription, [phone]  It was sent to them electronically

## 2021-09-23 NOTE — PROGRESS NOTES
[address]: Ms Bonnetta Brittle was seen today at [facility] for anatomic survey and cervical length screening ultrasound  See ultrasound report under "OB Procedures" tab    Please don't hesitate to contact our office with any concerns or questions   -Carla Cuevas MD

## 2021-09-23 NOTE — LETTER
September 23, 2021     Kaitlin Wright, DO  [address] [facility] [address]    Patient: Fatimah Gonzales   YOB: 1995   Date of Visit: 9/23/2021       Dear Dr Ena Henderson: Thank you for referring Javid Snyder to me for evaluation  Below are my notes for this consultation  If you have questions, please do not hesitate to call me  I look forward to following your patient along with you  Sincerely,        Pratibha Multani MD        CC: No Recipients  Pratibha Multani MD  9/23/2021  3:15 PM  Sign when Signing Visit  [email or web address]: Ms Angélica Delgado was seen today at [facility] for anatomic survey and cervical length screening ultrasound  See ultrasound report under "OB Procedures" tab    Please don't hesitate to contact our office with any concerns or questions   -Pratibha Multani MD

## 2021-09-24 ENCOUNTER — DOCUMENTATION (OUTPATIENT)
Dept: PERINATAL CARE | Facility: CLINIC | Age: 26
End: 2021-09-24

## 2021-09-24 ENCOUNTER — TELEPHONE (OUTPATIENT)
Dept: PERINATAL CARE | Facility: CLINIC | Age: 26
End: 2021-09-24

## 2021-09-24 NOTE — TELEPHONE ENCOUNTER
Rn called [facility] and spoke with Melissa Lombardi the pharmacist to fill prescription for Vaginal Progesterone 200mg supp at [facility], for a 28 day supply and have 4 refills, until 36 wks gestation  RN spoke with Pt to inform her that [facility] will call her when prescription is ready   Pt verbalized understanding  And declined any questions at this time

## 2021-09-24 NOTE — PROGRESS NOTES
Received message on nurse line from [address]  regarding pt's vaginal progesterone, that [address] doesn't fill up progesterone prescriptions anymore  RN called [facility] and spoke with Ani Madrid the pharmacist  to inquire if any active order of progesterone present or not  Lelon Freeze does not have any active  prescription for this pt  If pt still needs vaginal progesterone then she will need  new prescription sent  to [facility]

## 2021-09-30 ENCOUNTER — HOSPITAL ENCOUNTER (EMERGENCY)
Facility: HOSPITAL | Age: 26
Discharge: HOME | End: 2021-09-30
Attending: EMERGENCY MEDICINE
Payer: COMMERCIAL

## 2021-09-30 VITALS
TEMPERATURE: 98.5 F | SYSTOLIC BLOOD PRESSURE: 104 MMHG | HEART RATE: 106 BPM | RESPIRATION RATE: 18 BRPM | OXYGEN SATURATION: 98 % | WEIGHT: 113.32 LBS | DIASTOLIC BLOOD PRESSURE: 55 MMHG | BODY MASS INDEX: 22.13 KG/M2

## 2021-09-30 DIAGNOSIS — Z20.822 ENCOUNTER FOR LABORATORY TESTING FOR COVID-19 VIRUS: Primary | ICD-10-CM

## 2021-09-30 DIAGNOSIS — R05.9 COUGH: ICD-10-CM

## 2021-09-30 DIAGNOSIS — R43.0 LOSS OF SMELL: ICD-10-CM

## 2021-09-30 DIAGNOSIS — R43.2 LOSS OF TASTE: ICD-10-CM

## 2021-09-30 LAB — SARS-COV-2 RNA RESP QL NAA+PROBE: POSITIVE

## 2021-09-30 PROCEDURE — U0005 INFEC AGEN DETEC AMPLI PROBE: HCPCS | Performed by: EMERGENCY MEDICINE

## 2021-09-30 PROCEDURE — 99284 EMERGENCY DEPT VISIT MOD MDM: CPT

## 2021-09-30 PROCEDURE — U0003 INFECTIOUS AGENT DETECTION BY NUCLEIC ACID (DNA OR RNA); SEVERE ACUTE RESPIRATORY SYNDROME CORONAVIRUS 2 (SARS-COV-2) (CORONAVIRUS DISEASE [COVID-19]), AMPLIFIED PROBE TECHNIQUE, MAKING USE OF HIGH THROUGHPUT TECHNOLOGIES AS DESCRIBED BY CMS-2020-01-R: HCPCS | Performed by: EMERGENCY MEDICINE

## 2021-09-30 PROCEDURE — 99284 EMERGENCY DEPT VISIT MOD MDM: CPT | Performed by: EMERGENCY MEDICINE

## 2021-10-11 ENCOUNTER — ULTRASOUND (OUTPATIENT)
Dept: OBGYN CLINIC | Facility: CLINIC | Age: 26
End: 2021-10-11

## 2021-10-11 DIAGNOSIS — Z87.51 HISTORY OF PRETERM DELIVERY: Primary | ICD-10-CM

## 2021-10-11 PROCEDURE — 76817 TRANSVAGINAL US OBSTETRIC: CPT | Performed by: OBSTETRICS & GYNECOLOGY

## 2021-10-21 ENCOUNTER — TELEPHONE (OUTPATIENT)
Dept: OBGYN CLINIC | Facility: CLINIC | Age: 26
End: 2021-10-21

## 2021-10-22 ENCOUNTER — ROUTINE PRENATAL (OUTPATIENT)
Dept: OBGYN CLINIC | Facility: CLINIC | Age: 26
End: 2021-10-22

## 2021-10-22 ENCOUNTER — ULTRASOUND (OUTPATIENT)
Dept: OBGYN CLINIC | Facility: CLINIC | Age: 26
End: 2021-10-22

## 2021-10-22 VITALS
DIASTOLIC BLOOD PRESSURE: 60 MMHG | WEIGHT: 117 LBS | SYSTOLIC BLOOD PRESSURE: 102 MMHG | BODY MASS INDEX: 22.85 KG/M2 | HEART RATE: 85 BPM

## 2021-10-22 DIAGNOSIS — O09.899 HISTORY OF PRETERM DELIVERY, CURRENTLY PREGNANT: ICD-10-CM

## 2021-10-22 DIAGNOSIS — Z87.51 HISTORY OF PRETERM DELIVERY: Primary | ICD-10-CM

## 2021-10-22 DIAGNOSIS — O34.219 HISTORY OF CESAREAN DELIVERY, ANTEPARTUM: ICD-10-CM

## 2021-10-22 DIAGNOSIS — Z34.92 SECOND TRIMESTER PREGNANCY: Primary | ICD-10-CM

## 2021-10-22 DIAGNOSIS — Z3A.24 24 WEEKS GESTATION OF PREGNANCY: ICD-10-CM

## 2021-10-22 LAB
SL AMB  POCT GLUCOSE, UA: NORMAL
SL AMB POCT URINE PROTEIN: NORMAL

## 2021-10-22 PROCEDURE — 81002 URINALYSIS NONAUTO W/O SCOPE: CPT | Performed by: OBSTETRICS & GYNECOLOGY

## 2021-10-22 PROCEDURE — 76817 TRANSVAGINAL US OBSTETRIC: CPT | Performed by: OBSTETRICS & GYNECOLOGY

## 2021-10-22 PROCEDURE — 99213 OFFICE O/P EST LOW 20 MIN: CPT | Performed by: OBSTETRICS & GYNECOLOGY

## 2021-12-08 ENCOUNTER — TELEPHONE (OUTPATIENT)
Dept: OBGYN CLINIC | Facility: CLINIC | Age: 26
End: 2021-12-08

## 2021-12-09 ENCOUNTER — HOSPITAL ENCOUNTER (OUTPATIENT)
Facility: HOSPITAL | Age: 26
Discharge: HOME | End: 2021-12-09
Attending: OBSTETRICS & GYNECOLOGY | Admitting: OBSTETRICS & GYNECOLOGY
Payer: COMMERCIAL

## 2021-12-09 VITALS
SYSTOLIC BLOOD PRESSURE: 112 MMHG | RESPIRATION RATE: 18 BRPM | HEART RATE: 86 BPM | OXYGEN SATURATION: 98 % | DIASTOLIC BLOOD PRESSURE: 58 MMHG | TEMPERATURE: 97.6 F

## 2021-12-09 PROBLEM — Z3A.31 31 WEEKS GESTATION OF PREGNANCY: Status: ACTIVE | Noted: 2021-07-22

## 2021-12-09 PROCEDURE — 99203 OFFICE O/P NEW LOW 30 MIN: CPT

## 2021-12-09 PROCEDURE — 99213 OFFICE O/P EST LOW 20 MIN: CPT | Performed by: OBSTETRICS & GYNECOLOGY

## 2021-12-10 NOTE — PROGRESS NOTES
Triage Note - OB  Neha Goodwin 31 y o  female MRN: [id number]  Unit/Bed#: L&D  Encounter: [id number]    OB TRIAGE NOTE  Neha Goodwin  [id number]  2021  8:09PM  L&D /L&D     Assessment/Plan:  31 y o  [id number] 31w5d with negative rupture workup  NST reactive without decelerations  No contractions on tocometry  Discharge instructions  Patient instructed to call if experiencing worsening contractions, vaginal bleeding, further loss of fluid or decreased fetal movement  She will follow up with her OBGYN on 21  Plan of care discussed with Dr Mercado Nurse  ______________________________________  SUBJECTIVE    [place]: Estimated Date of Delivery: 22    HPI Chronology:  31 y o  [id number] 31w5d presents after 3 episodes of leakage of small amounts of clear fluid while at work  She is certain it was not urine  She denies vaginal bleeding  She has normal fetal movement  She is feeling fetal movement  ObHx notable for 1 prior vaginal delivery followed by a  section  She is unsure if she wants to attempt TOLAC  Vitals:   /58 (BP Location: Right arm)   Pulse 86   Temp 97 6 °F (36 4 °C) (Oral)   Resp 18   LMP 2021   SpO2 98%   There is no height or weight on file to calculate BMI  Review of Systems  12-point ROS negative unless stated in the HPI  Physical Exam  GEN: appears well, alert and oriented x 3, no apparent distress  ABDOMEN: gravid, soft, no tenderness  GENITOURINARY: normal external female genitalia  No pooling of amniotic fluid on speculum exam with or without Valsalva  Nitrazine and ferning negative  No evidence of acute vaginitis     EXTREMITIES: nontender, no edema  FETAL ASSESSMENT:  FHT: Baseline Rate: 140 bpm  Variability: Moderate 6-25 bpm  Accelerations: 15 x 15 or greater  Decelerations: None  TOCO: Contraction Frequency (minutes): n/a  Contraction Duration (seconds): n/a  Contraction Quality: Not applicable    Labs: No results found for this or any previous visit (from the past 24 hour(s))  Lab, Imaging and other studies: I have personally reviewed pertinent reports  Tessie Walker MD  PGY II, OB/GYN  12/9/2021, 8:09 PM

## 2021-12-10 NOTE — DISCHARGE INSTRUCTIONS
Kick Counts in Pregnancy   WHAT YOU NEED TO KNOW:   Kick counts measure how much your baby is moving in your womb  A kick from your baby can be felt as a twist, turn, swish, roll, or jab  It is common to feel your baby kicking at 26 to 28 weeks of pregnancy  You may feel your baby kick as early as 20 weeks of pregnancy  You may want to start counting at 28 weeks  DISCHARGE INSTRUCTIONS:   Contact your doctor immediately if:   · You feel a change in the number of kicks or movements of your baby  · You feel fewer than 10 kicks within 2 hours  · You have questions or concerns about your baby's movements  Why measure kick counts:  Your baby's movement may provide information about your baby's health  He or she may move less, or not at all, if there are problems  Your baby may move less if he or she is not getting enough oxygen or nutrition from the placenta  Do not smoke while you are pregnant  Smoking decreases the amount of oxygen that gets to your baby  Talk to your healthcare provider if you need help to quit smoking  Tell your healthcare provider as soon as you feel a change in your baby's movements  When to measure kick counts:   · Measure kick counts at the same time every day  · Measure kick counts when your baby is awake and most active  Your baby may be most active in the evening  How to measure kick counts:  Check that your baby is awake before you measure kick counts  You can wake up your baby by lightly pushing on your belly, walking, or drinking something cold  Your healthcare provider may tell you different ways to measure kick counts  You may be told to do the following:  · Use a chart or clock to keep track of the time you start and finish counting  · Sit in a chair or lie on your left side  · Place your hands on the largest part of your belly  · Count until you reach 10 kicks  Write down how much time it takes to count 10 kicks       · It may take 30 minutes to 2 hours to count 10 kicks  It should not take more than 2 hours to count 10 kicks  Follow up with your doctor as directed:  Write down your questions so you remember to ask them during your visits  © Copyright [employer] 2021 Information is for End User's use only and may not be sold, redistributed or otherwise used for commercial purposes  All illustrations and images included in CareNotes® are the copyrighted property of A D A M , Inc  or [facility] Mitchell Aguilar   The above information is an  only  It is not intended as medical advice for individual conditions or treatments  Talk to your doctor, nurse or pharmacist before following any medical regimen to see if it is safe and effective for you

## 2021-12-16 ENCOUNTER — ULTRASOUND (OUTPATIENT)
Dept: PERINATAL CARE | Facility: OTHER | Age: 26
End: 2021-12-16
Payer: COMMERCIAL

## 2021-12-16 VITALS
SYSTOLIC BLOOD PRESSURE: 103 MMHG | DIASTOLIC BLOOD PRESSURE: 69 MMHG | WEIGHT: 125.2 LBS | BODY MASS INDEX: 24.58 KG/M2 | HEART RATE: 93 BPM | HEIGHT: 60 IN

## 2021-12-16 DIAGNOSIS — Z28.21 COVID-19 VACCINATION DECLINED: ICD-10-CM

## 2021-12-16 DIAGNOSIS — O09.899 HISTORY OF PRETERM DELIVERY, CURRENTLY PREGNANT: ICD-10-CM

## 2021-12-16 DIAGNOSIS — Z3A.32 32 WEEKS GESTATION OF PREGNANCY: Primary | ICD-10-CM

## 2021-12-16 PROCEDURE — 99213 OFFICE O/P EST LOW 20 MIN: CPT | Performed by: OBSTETRICS & GYNECOLOGY

## 2021-12-16 PROCEDURE — 76816 OB US FOLLOW-UP PER FETUS: CPT | Performed by: OBSTETRICS & GYNECOLOGY

## 2021-12-18 PROBLEM — Z3A.32 32 WEEKS GESTATION OF PREGNANCY: Status: ACTIVE | Noted: 2021-07-22

## 2021-12-18 PROBLEM — Z28.21 COVID-19 VACCINATION DECLINED: Status: ACTIVE | Noted: 2021-12-18

## 2021-12-19 ENCOUNTER — HOSPITAL ENCOUNTER (OUTPATIENT)
Facility: HOSPITAL | Age: 26
Setting detail: OBSERVATION
Discharge: HOME | End: 2021-12-20
Attending: OBSTETRICS & GYNECOLOGY | Admitting: OBSTETRICS & GYNECOLOGY
Payer: COMMERCIAL

## 2021-12-19 DIAGNOSIS — O09.899 HISTORY OF PRETERM DELIVERY, CURRENTLY PREGNANT: ICD-10-CM

## 2021-12-19 DIAGNOSIS — Z3A.33 33 WEEKS GESTATION OF PREGNANCY: Primary | ICD-10-CM

## 2021-12-19 PROCEDURE — 99218 PR INITIAL OBSERVATION CARE/DAY 30 MINUTES: CPT | Performed by: OBSTETRICS & GYNECOLOGY

## 2021-12-19 RX ORDER — CALCIUM CARBONATE 200(500)MG
1000 TABLET,CHEWABLE ORAL DAILY PRN
Status: DISCONTINUED | OUTPATIENT
Start: 2021-12-19 | End: 2021-12-21 | Stop reason: HOSPADM

## 2021-12-19 RX ORDER — SODIUM CHLORIDE, SODIUM LACTATE, POTASSIUM CHLORIDE, CALCIUM CHLORIDE 600; 310; 30; 20 MG/100ML; MG/100ML; MG/100ML; MG/100ML
125 INJECTION, SOLUTION INTRAVENOUS CONTINUOUS
Status: DISCONTINUED | OUTPATIENT
Start: 2021-12-19 | End: 2021-12-20

## 2021-12-19 RX ORDER — ACETAMINOPHEN 325 MG/1
650 TABLET ORAL EVERY 4 HOURS PRN
Status: DISCONTINUED | OUTPATIENT
Start: 2021-12-19 | End: 2021-12-21 | Stop reason: HOSPADM

## 2021-12-19 RX ORDER — ONDANSETRON 2 MG/ML
4 INJECTION INTRAMUSCULAR; INTRAVENOUS EVERY 8 HOURS PRN
Status: DISCONTINUED | OUTPATIENT
Start: 2021-12-19 | End: 2021-12-21 | Stop reason: HOSPADM

## 2021-12-19 RX ORDER — BETAMETHASONE SODIUM PHOSPHATE AND BETAMETHASONE ACETATE 3; 3 MG/ML; MG/ML
12 INJECTION, SUSPENSION INTRA-ARTICULAR; INTRALESIONAL; INTRAMUSCULAR; SOFT TISSUE ONCE
Status: COMPLETED | OUTPATIENT
Start: 2021-12-19 | End: 2021-12-19

## 2021-12-19 RX ADMIN — BETAMETHASONE SODIUM PHOSPHATE AND BETAMETHASONE ACETATE 12 MG: 3; 3 INJECTION, SUSPENSION INTRA-ARTICULAR; INTRALESIONAL; INTRAMUSCULAR at 21:29

## 2021-12-20 VITALS
TEMPERATURE: 98 F | HEART RATE: 101 BPM | WEIGHT: 125 LBS | SYSTOLIC BLOOD PRESSURE: 107 MMHG | RESPIRATION RATE: 16 BRPM | DIASTOLIC BLOOD PRESSURE: 57 MMHG | OXYGEN SATURATION: 97 % | BODY MASS INDEX: 24.41 KG/M2

## 2021-12-20 LAB
ABO GROUP BLD: NORMAL
BACTERIA UR QL AUTO: ABNORMAL /HPF
BILIRUB UR QL STRIP: NEGATIVE
BLD GP AB SCN SERPL QL: NEGATIVE
CLARITY UR: CLEAR
COLOR UR: YELLOW
GLUCOSE SERPL-MCNC: 117 MG/DL (ref 65–140)
GLUCOSE UR STRIP-MCNC: NEGATIVE MG/DL
HGB UR QL STRIP.AUTO: NEGATIVE
KETONES UR STRIP-MCNC: NEGATIVE MG/DL
LEUKOCYTE ESTERASE UR QL STRIP: NEGATIVE
NITRITE UR QL STRIP: NEGATIVE
NON-SQ EPI CELLS URNS QL MICRO: ABNORMAL /HPF
PH UR STRIP.AUTO: 7 [PH]
PROT UR STRIP-MCNC: NEGATIVE MG/DL
RBC #/AREA URNS AUTO: ABNORMAL /HPF
RH BLD: POSITIVE
RPR SER QL: NORMAL
SP GR UR STRIP.AUTO: 1.01 (ref 1–1.03)
SPECIMEN EXPIRATION DATE: NORMAL
UROBILINOGEN UR QL STRIP.AUTO: 0.2 E.U./DL
WBC #/AREA URNS AUTO: ABNORMAL /HPF

## 2021-12-20 PROCEDURE — 81001 URINALYSIS AUTO W/SCOPE: CPT | Performed by: OBSTETRICS & GYNECOLOGY

## 2021-12-20 PROCEDURE — 86850 RBC ANTIBODY SCREEN: CPT | Performed by: OBSTETRICS & GYNECOLOGY

## 2021-12-20 PROCEDURE — 86900 BLOOD TYPING SEROLOGIC ABO: CPT | Performed by: OBSTETRICS & GYNECOLOGY

## 2021-12-20 PROCEDURE — 86592 SYPHILIS TEST NON-TREP QUAL: CPT | Performed by: OBSTETRICS & GYNECOLOGY

## 2021-12-20 PROCEDURE — 82948 REAGENT STRIP/BLOOD GLUCOSE: CPT

## 2021-12-20 PROCEDURE — 99215 OFFICE O/P EST HI 40 MIN: CPT

## 2021-12-20 PROCEDURE — 87150 DNA/RNA AMPLIFIED PROBE: CPT | Performed by: OBSTETRICS & GYNECOLOGY

## 2021-12-20 PROCEDURE — 86901 BLOOD TYPING SEROLOGIC RH(D): CPT | Performed by: OBSTETRICS & GYNECOLOGY

## 2021-12-20 PROCEDURE — 99217 PR OBSERVATION CARE DISCHARGE MANAGEMENT: CPT | Performed by: OBSTETRICS & GYNECOLOGY

## 2021-12-20 RX ORDER — BUTALBITAL, ACETAMINOPHEN AND CAFFEINE 50; 325; 40 MG/1; MG/1; MG/1
1 TABLET ORAL ONCE
Status: COMPLETED | OUTPATIENT
Start: 2021-12-20 | End: 2021-12-20

## 2021-12-20 RX ORDER — BETAMETHASONE SODIUM PHOSPHATE AND BETAMETHASONE ACETATE 3; 3 MG/ML; MG/ML
12 INJECTION, SUSPENSION INTRA-ARTICULAR; INTRALESIONAL; INTRAMUSCULAR; SOFT TISSUE ONCE
Status: COMPLETED | OUTPATIENT
Start: 2021-12-20 | End: 2021-12-20

## 2021-12-20 RX ORDER — ACETAMINOPHEN 325 MG/1
650 TABLET ORAL EVERY 4 HOURS PRN
Refills: 0
Start: 2021-12-20 | End: 2022-01-26 | Stop reason: HOSPADM

## 2021-12-20 RX ADMIN — BUTALBITAL, ACETAMINOPHEN, AND CAFFEINE 1 TABLET: 50; 325; 40 TABLET ORAL at 15:02

## 2021-12-20 RX ADMIN — SODIUM CHLORIDE, SODIUM LACTATE, POTASSIUM CHLORIDE, AND CALCIUM CHLORIDE 125 ML/HR: .6; .31; .03; .02 INJECTION, SOLUTION INTRAVENOUS at 00:30

## 2021-12-20 RX ADMIN — ACETAMINOPHEN 650 MG: 325 TABLET, FILM COATED ORAL at 12:57

## 2021-12-20 RX ADMIN — SODIUM CHLORIDE, SODIUM LACTATE, POTASSIUM CHLORIDE, AND CALCIUM CHLORIDE 125 ML/HR: .6; .31; .03; .02 INJECTION, SOLUTION INTRAVENOUS at 06:11

## 2021-12-20 RX ADMIN — ACETAMINOPHEN 650 MG: 325 TABLET, FILM COATED ORAL at 01:46

## 2021-12-20 RX ADMIN — BETAMETHASONE SODIUM PHOSPHATE AND BETAMETHASONE ACETATE 12 MG: 3; 3 INJECTION, SUSPENSION INTRA-ARTICULAR; INTRALESIONAL; INTRAMUSCULAR at 21:41

## 2021-12-22 LAB — GP B STREP DNA SPEC QL NAA+PROBE: NEGATIVE

## 2021-12-28 ENCOUNTER — TELEPHONE (OUTPATIENT)
Dept: OBGYN CLINIC | Facility: CLINIC | Age: 26
End: 2021-12-28

## 2021-12-28 ENCOUNTER — HOSPITAL ENCOUNTER (OUTPATIENT)
Facility: HOSPITAL | Age: 26
Discharge: HOME | End: 2021-12-28
Attending: OBSTETRICS & GYNECOLOGY | Admitting: OBSTETRICS & GYNECOLOGY
Payer: COMMERCIAL

## 2021-12-28 VITALS
TEMPERATURE: 98.2 F | OXYGEN SATURATION: 98 % | HEART RATE: 97 BPM | SYSTOLIC BLOOD PRESSURE: 109 MMHG | DIASTOLIC BLOOD PRESSURE: 72 MMHG | RESPIRATION RATE: 17 BRPM

## 2021-12-28 DIAGNOSIS — O26.899 PELVIC PAIN DURING PREGNANCY: ICD-10-CM

## 2021-12-28 DIAGNOSIS — R10.2 PELVIC PAIN DURING PREGNANCY: ICD-10-CM

## 2021-12-28 DIAGNOSIS — O26.893 ABDOMINAL PAIN DURING PREGNANCY IN THIRD TRIMESTER: Primary | ICD-10-CM

## 2021-12-28 DIAGNOSIS — R10.9 ABDOMINAL PAIN DURING PREGNANCY IN THIRD TRIMESTER: Primary | ICD-10-CM

## 2021-12-28 PROCEDURE — 99212 OFFICE O/P EST SF 10 MIN: CPT

## 2021-12-28 PROCEDURE — 99214 OFFICE O/P EST MOD 30 MIN: CPT | Performed by: OBSTETRICS & GYNECOLOGY

## 2021-12-28 PROCEDURE — NC001 PR NO CHARGE: Performed by: OBSTETRICS & GYNECOLOGY

## 2021-12-28 PROCEDURE — 59025 FETAL NON-STRESS TEST: CPT | Performed by: OBSTETRICS & GYNECOLOGY

## 2021-12-28 NOTE — H&P
L&D Triage Note - OB/GYN  Jordan Duvall 31 y o  female MRN: [id number]  Unit/Bed#: L&D 329-02 Encounter: [id number]    Patient is seen by [address] Sarah Recinos is a 31 y o  [id number] at 34w3d presenting for R sided crampy abdominal pain 2/2 MSK strain  PLAN  #1  R abdominal pain  · Likely 2/2 MSK cause  · Plan: ambulatory referral to physical therapy  · Recommend use of pelvic braces such as S1-LOC belt and Serola S1 belt  · Recommend seeking accommodations at work to be off feet more often    #2  34 week pregnancy  · 2/70/-3  · No signs of PTL at this time  · Discharge to home, follow up with OBGYN    #Discharge instructions  · Patient instructed to call if experiencing worsening contractions, vaginal bleeding, loss of fluid or decreased fetal movement  · Will follow up with OBGYN on 1/6/22  D/w Dr Donna Calix  ______________    SUBJECTIVE    [place]: Estimated Date of Delivery: 2/5/22    HPI:  31 y o  [id number] 34w3d presents with complaint of R sided crampy 8/10 abdominal pain that began Ramos night  Pain has persisted since then, causing pt to proceed to L&D  She admits to abdominal pressure that is worst than when she presented to [facility] L[facility] 10/19/21  She is unsure if she is having contractions, but admits to tightening around her abdomen  She denies LOF, VB, dysuria, vaginal discharge  She reports good FM      Contractions: denies  Leakage of fluid: denies  Vaginal Bleeding: denies  Fetal movement: present    Her current obstetrical history is significant for 13%ile EFW    Her past obstetrical history is significant for PTL 34 and 33 weeks, prior LTCS      ROS:  Constitutional: Negative  Respiratory: denies cough, shortness of breath  Cardiovascular: denies chest pain, palpitations  Gastrointestinal: denies n/v, admits to abdominal pain  Genitourinary: denies vaginal discharge, dysuria  Musculoskeletal: denies calf pain    OBJECTIVE:  /72 (BP Location: Left arm)   Pulse 97   Temp 98 2 °F (36 8 °C) (Oral)   Resp 17   LMP 05/01/2021   SpO2 98%   There is no height or weight on file to calculate BMI  Physical Exam  Constitutional:       Appearance: Normal appearance  HENT:      Head: Normocephalic  Nose: Nose normal       Mouth/Throat:      Mouth: Mucous membranes are moist    Eyes:      General: No scleral icterus  Cardiovascular:      Rate and Rhythm: Normal rate and regular rhythm  Pulses: Normal pulses  Heart sounds: Normal heart sounds  Pulmonary:      Effort: Pulmonary effort is normal       Breath sounds: Normal breath sounds  Abdominal:      Tenderness: There is abdominal tenderness  There is no guarding or rebound  Genitourinary:     General: Normal vulva  Comments: 2/70/-3  Musculoskeletal:         General: Normal range of motion  Cervical back: Normal range of motion  Skin:     General: Skin is warm and dry  Neurological:      General: No focal deficit present  Mental Status: She is alert  Psychiatric:         Mood and Affect: Mood normal          Behavior: Behavior normal            SVE:    2/70/-3     FHT:  Baseline Rate: 145 bpm  Variability: Moderate 6-25 bpm  Accelerations: 15 x 15 or greater  Decelerations: None  FHR Category: Category I    TOCO:   Contraction Frequency (minutes): 0  (irritability present)  Contraction Duration (seconds): n/a  Contraction Quality: Not applicable    IMAGING:       TVUS   Presentation: Vetex    Labs: No results found for this or any previous visit (from the past 24 hour(s))        Meryle Jewels, DO PGY-1  Family Medicine  12/28/2021  4:33 PM

## 2021-12-28 NOTE — DISCHARGE INSTRUCTIONS
Pelvic Braces in Pregnancy    S1 - LOC Belt  Serola S1 Belt        Abdominal Pain in Pregnancy   WHAT YOU NEED TO KNOW:   What do I need to know about abdominal pain during pregnancy? Abdominal pain during pregnancy is common  Some causes include heartburn, constipation, gas, false labor, and round ligament pain  Round ligament pain is caused by stretching of the ligaments that support your uterus  Abdominal pain may be caused by a health problem, such as a stomach virus or appendicitis (inflammation of the appendix)  The pain may also be caused by a problem with your pregnancy, such as a threatened miscarriage or  labor  How is the cause of pain during pregnancy diagnosed? Your healthcare provider will examine you and ask you about the pain  You may also need any of the following:  · Blood tests  may be done to check for inflammation, liver function, blood cell levels, or get information about your overall health  · Ultrasound  pictures may be used to check the organs inside your abdomen, including your uterus  Your baby may also be checked  · MRI  pictures may be used if ultrasound pictures do not show a clear cause for your pain  Do not enter the MRI room with anything metal  Metal can cause serious injury  Tell the healthcare provider if you have any metal in or on your body  How is abdominal pain during pregnancy treated? Treatment will depend on the cause of your pain  Ask your healthcare provider before you take any medicine during pregnancy, including over-the-counter pain medicines  Acetaminophen may be recommended  Ask how much to take and how often to take it  Follow directions  Acetaminophen can cause liver damage if not taken correctly  Do not use more than 4 grams (4,000 milligrams) total of acetaminophen in 1 day  Acetaminophen can cause liver damage  Your provider will tell you how much is safe to take each day during pregnancy  Too much medicine can be harmful to your baby  Read the labels of all other medicines you are using to see if they also contain acetaminophen, or ask your doctor or pharmacist   What can I do to manage my symptoms? · Rest as needed  Rest may help to relieve pain  Your healthcare provider may recommend that you rest on your side instead of on your back  He or she may tell you to lie on your left side, if possible  Place a pillow under your abdomen  Keep another pillow between your knees  Ask your provider about other ways to relieve this pain, such as a supportive belt or pregnancy exercises  · Do not lie flat in bed or bend over if you have heartburn  Ask your obstetrician if you should make any changes to the foods you eat  Ask if you can take any medicines for heartburn  · Move slowly  Avoid quick changes in position or movements that cause pain  · Exercise as directed  Gentle exercise can keep the ligaments loose and strengthen core (abdominal) muscles  An example is swimming, or a yoga program designed for pregnancy  Ask your healthcare provider which exercises are safe for you and how often to exercise  For most healthy women, a good goal is to try to get at least 30 minutes of exercise every day  If activity causes pain, try not to walk too long or too far at one time  Break your exercise up into short amounts  · Apply a warm compress to the area  Warmth can relieve pain and muscle spasms  Ask your healthcare provider if you can take a warm bath or use a heating pad  Keep all heat settings low  High heat can be dangerous for your baby  Do not sit in a hot tub or use hot water in your bath  You may also be able to massage the area gently while you are applying heat  Massage can help relieve pain  · Eat more fiber and drink more liquids to relieve constipation  Fiber is found in fruits, vegetables, and whole-grain foods, such as whole-wheat bread and cereals   Ask how much liquid to drink each day and which liquids are best for you  Call your local emergency number (911 in the [address]) if:   · You have a fast heartbeat  · You have shortness of breath  · You feel lightheaded or faint  When should I seek immediate care? · You have sudden, severe pain or cramps that are so bad that you cannot walk or talk  · You have vaginal bleeding or discharge  · You have nausea, vomiting, fever, and severe pain on your right side  When should I call my obstetrician? · You have light vaginal bleeding or spotting  · You continue to have abdominal pain that cannot be relieved  · You have a fever  · You have questions or concerns about your condition or care  CARE AGREEMENT:   You have the right to help plan your care  Learn about your health condition and how it may be treated  Discuss treatment options with your healthcare providers to decide what care you want to receive  You always have the right to refuse treatment  The above information is an  only  It is not intended as medical advice for individual conditions or treatments  Talk to your doctor, nurse or pharmacist before following any medical regimen to see if it is safe and effective for you  © Copyright [employer] 2021 Information is for End User's use only and may not be sold, redistributed or otherwise used for commercial purposes   All illustrations and images included in CareNotes® are the copyrighted property of [employer]  or [facility] [employer]

## 2021-12-28 NOTE — PROGRESS NOTES
L&D Triage Note - OB/GYN  Linette Pompa 31 y o  female MRN: [id number]  Unit/Bed#: L&D 329-02 Encounter: [id number]     Patient is seen by [address]     ASSESSMENT  Linette Pompa is a 31 y o  [id number] at 34w3d presenting for R sided crampy abdominal pain 2/2 MSK strain      PLAN  #1  R abdominal pain  · Likely 2/2 MSK cause  · Plan: ambulatory referral to physical therapy  · Recommend use of pelvic braces such as S1-LOC belt and Serola S1 belt  · Recommend seeking accommodations at work to be off feet more often     #2  34 week pregnancy  · 2/70/-3  · No signs of PTL at this time  · Discharge to home, follow up with OBGYN     #Discharge instructions  · Patient instructed to call if experiencing worsening contractions, vaginal bleeding, loss of fluid or decreased fetal movement  · Will follow up with OBGYN on 1/6/22      D/w Dr Jayro Perkins  ______________     SUBJECTIVE     [place]: Estimated Date of Delivery: 2/5/22     HPI:  31 y o  [id number] 34w3d presents with complaint of R sided crampy 8/10 abdominal pain that began Ramos night  Pain has persisted since then, causing pt to proceed to L&D  She admits to abdominal pressure that is worst than when she presented to [facility] L[facility] 10/19/21  She is unsure if she is having contractions, but admits to tightening around her abdomen  She denies LOF, VB, dysuria, vaginal discharge   She reports good FM      Contractions: denies  Leakage of fluid: denies  Vaginal Bleeding: denies  Fetal movement: present     Her current obstetrical history is significant for 13%ile EFW     Her past obstetrical history is significant for PTL 34 and 33 weeks, prior LTCS        ROS:  Constitutional: Negative  Respiratory: denies cough, shortness of breath  Cardiovascular: denies chest pain, palpitations  Gastrointestinal: denies n/v, admits to abdominal pain  Genitourinary: denies vaginal discharge, dysuria  Musculoskeletal: denies calf pain     OBJECTIVE:  /72 (BP Location: Left arm)   Pulse 97   Temp 98 2 °F (36 8 °C) (Oral)   Resp 17   LMP 05/01/2021   SpO2 98%   There is no height or weight on file to calculate BMI      Physical Exam  Constitutional:       Appearance: Normal appearance  HENT:      Head: Normocephalic  Nose: Nose normal       Mouth/Throat:      Mouth: Mucous membranes are moist    Eyes:      General: No scleral icterus  Cardiovascular:      Rate and Rhythm: Normal rate and regular rhythm  Pulses: Normal pulses  Heart sounds: Normal heart sounds  Pulmonary:      Effort: Pulmonary effort is normal       Breath sounds: Normal breath sounds  Abdominal:      Tenderness: There is abdominal tenderness  There is no guarding or rebound  Genitourinary:     General: Normal vulva  Comments: 2/70/-3  Musculoskeletal:         General: Normal range of motion  Cervical back: Normal range of motion  Skin:     General: Skin is warm and dry  Neurological:      General: No focal deficit present  Mental Status: She is alert     Psychiatric:         Mood and Affect: Mood normal          Behavior: Behavior normal                SVE:    2/70/-3      FHT:  Baseline Rate: 145 bpm  Variability: Moderate 6-25 bpm  Accelerations: 15 x 15 or greater  Decelerations: None  FHR Category: Category I     TOCO:   Contraction Frequency (minutes): 0  (irritability present)  Contraction Duration (seconds): n/a  Contraction Quality: Not applicable     IMAGING:       TVUS              Presentation: Vetex     Labs:   Recent Results   No results found for this or any previous visit (from the past 24 hour(s))            Liseth Murray DO PGY-1  Family Medicine  12/28/2021  4:33 PM

## 2021-12-28 NOTE — PROGRESS NOTES
Patient discharged to home by doctor  Discharge instructions/precautions reviewed with patient by Dr Lorenzo Sheridan  Patient verbalizes understanding & denies further questions at this time

## 2021-12-29 DIAGNOSIS — R10.2 PELVIC PAIN DURING PREGNANCY: Primary | ICD-10-CM

## 2021-12-29 DIAGNOSIS — O26.899 PELVIC PAIN DURING PREGNANCY: Primary | ICD-10-CM

## 2022-01-06 ENCOUNTER — ROUTINE PRENATAL (OUTPATIENT)
Dept: OBGYN CLINIC | Facility: CLINIC | Age: 27
End: 2022-01-06
Payer: COMMERCIAL

## 2022-01-06 ENCOUNTER — ULTRASOUND (OUTPATIENT)
Dept: OBGYN CLINIC | Facility: CLINIC | Age: 27
End: 2022-01-06
Payer: COMMERCIAL

## 2022-01-06 ENCOUNTER — ULTRASOUND (OUTPATIENT)
Dept: PERINATAL CARE | Facility: OTHER | Age: 27
End: 2022-01-06
Payer: COMMERCIAL

## 2022-01-06 VITALS
HEART RATE: 75 BPM | DIASTOLIC BLOOD PRESSURE: 64 MMHG | SYSTOLIC BLOOD PRESSURE: 106 MMHG | BODY MASS INDEX: 24.26 KG/M2 | WEIGHT: 124.2 LBS

## 2022-01-06 VITALS
SYSTOLIC BLOOD PRESSURE: 120 MMHG | DIASTOLIC BLOOD PRESSURE: 84 MMHG | BODY MASS INDEX: 24.54 KG/M2 | HEART RATE: 94 BPM | WEIGHT: 125 LBS | HEIGHT: 60 IN

## 2022-01-06 DIAGNOSIS — O34.219 HISTORY OF CESAREAN DELIVERY, ANTEPARTUM: ICD-10-CM

## 2022-01-06 DIAGNOSIS — O36.5930 INTRAUTERINE GROWTH RESTRICTION AFFECTING ANTEPARTUM CARE OF MOTHER IN THIRD TRIMESTER, SINGLE OR UNSPECIFIED FETUS: Primary | ICD-10-CM

## 2022-01-06 DIAGNOSIS — Z34.93 THIRD TRIMESTER PREGNANCY: Primary | ICD-10-CM

## 2022-01-06 DIAGNOSIS — Z3A.35 35 WEEKS GESTATION OF PREGNANCY: ICD-10-CM

## 2022-01-06 DIAGNOSIS — Z86.2 HISTORY OF ANEMIA: ICD-10-CM

## 2022-01-06 DIAGNOSIS — O09.899 HISTORY OF PRETERM DELIVERY, CURRENTLY PREGNANT: ICD-10-CM

## 2022-01-06 DIAGNOSIS — O36.8130 DECREASED FETAL MOVEMENTS IN THIRD TRIMESTER, SINGLE OR UNSPECIFIED FETUS: Primary | ICD-10-CM

## 2022-01-06 PROCEDURE — 76820 UMBILICAL ARTERY ECHO: CPT | Performed by: OBSTETRICS & GYNECOLOGY

## 2022-01-06 PROCEDURE — 90686 IIV4 VACC NO PRSV 0.5 ML IM: CPT | Performed by: OBSTETRICS & GYNECOLOGY

## 2022-01-06 PROCEDURE — 59025 FETAL NON-STRESS TEST: CPT | Performed by: OBSTETRICS & GYNECOLOGY

## 2022-01-06 PROCEDURE — 76815 OB US LIMITED FETUS(S): CPT | Performed by: OBSTETRICS & GYNECOLOGY

## 2022-01-06 PROCEDURE — 90715 TDAP VACCINE 7 YRS/> IM: CPT | Performed by: OBSTETRICS & GYNECOLOGY

## 2022-01-06 PROCEDURE — 90472 IMMUNIZATION ADMIN EACH ADD: CPT | Performed by: OBSTETRICS & GYNECOLOGY

## 2022-01-06 PROCEDURE — 76816 OB US FOLLOW-UP PER FETUS: CPT | Performed by: OBSTETRICS & GYNECOLOGY

## 2022-01-06 PROCEDURE — 99214 OFFICE O/P EST MOD 30 MIN: CPT | Performed by: OBSTETRICS & GYNECOLOGY

## 2022-01-06 PROCEDURE — 90471 IMMUNIZATION ADMIN: CPT | Performed by: OBSTETRICS & GYNECOLOGY

## 2022-01-06 PROCEDURE — PNV: Performed by: OBSTETRICS & GYNECOLOGY

## 2022-01-06 NOTE — LETTER
NST sleeve cover sheet    Patient name: Kinjal Khan  : 1995  MRN: [id number]    [place]: Estimated Date of Delivery: 22    Obstetrician: ____________[facility]_________    Reason(s) for testing:  _________________IUGR_________________      Testing frequency:    ___ 2x/wk  ___ 1x/wk  ___ Dopplers  ___ BPP?       Last growth scan: __________________________________________

## 2022-01-06 NOTE — PATIENT INSTRUCTIONS
Kick Counts in Pregnancy   WHAT YOU NEED TO KNOW:   Kick counts measure how much your baby is moving in your womb  A kick from your baby can be felt as a twist, turn, swish, roll, or jab  It is common to feel your baby kicking at 26 to 28 weeks of pregnancy  You may feel your baby kick as early as 20 weeks of pregnancy  You may want to start counting at 28 weeks  DISCHARGE INSTRUCTIONS:   Contact your doctor immediately if:   · You feel a change in the number of kicks or movements of your baby  · You feel fewer than 10 kicks within 2 hours  · You have questions or concerns about your baby's movements  Why measure kick counts:  Your baby's movement may provide information about your baby's health  He or she may move less, or not at all, if there are problems  Your baby may move less if he or she is not getting enough oxygen or nutrition from the placenta  Do not smoke while you are pregnant  Smoking decreases the amount of oxygen that gets to your baby  Talk to your healthcare provider if you need help to quit smoking  Tell your healthcare provider as soon as you feel a change in your baby's movements  When to measure kick counts:   · Measure kick counts at the same time every day  · Measure kick counts when your baby is awake and most active  Your baby may be most active in the evening  How to measure kick counts:  Check that your baby is awake before you measure kick counts  You can wake up your baby by lightly pushing on your belly, walking, or drinking something cold  Your healthcare provider may tell you different ways to measure kick counts  You may be told to do the following:  · Use a chart or clock to keep track of the time you start and finish counting  · Sit in a chair or lie on your left side  · Place your hands on the largest part of your belly  · Count until you reach 10 kicks  Write down how much time it takes to count 10 kicks       · It may take 30 minutes to 2 hours to count 10 kicks  It should not take more than 2 hours to count 10 kicks  Follow up with your doctor as directed:  Write down your questions so you remember to ask them during your visits  © Copyright [employer] 2021 Information is for End User's use only and may not be sold, redistributed or otherwise used for commercial purposes  All illustrations and images included in CareNotes® are the copyrighted property of A D A M , Inc  or [facility] [employer]   The above information is an  only  It is not intended as medical advice for individual conditions or treatments  Talk to your doctor, nurse or pharmacist before following any medical regimen to see if it is safe and effective for you

## 2022-01-06 NOTE — LETTER
January 6, 2022     Cecile Cuevas MD  [facility] [place] [place] [address] [address] [facility]    Patient: Linette Pompa   YOB: 1995   Date of Visit: 1/6/2022       Dear Dr Hernandez Backer: Thank you for referring Regla Nick to me for evaluation  Below are my notes for this consultation  If you have questions, please do not hesitate to call me  I look forward to following your patient along with you  Sincerely,        Nicole Lozano MD        CC: No Recipients  Nicole Lozano MD  1/6/2022 10:13 AM  Sign when Signing Visit  Please refer to the [facility] ultrasound report in Ob Procedures for additional information regarding today's visit

## 2022-01-06 NOTE — PROGRESS NOTES
Patient was seen today for prenatal visit  Last seen at 24 6 weeks due to insurance concerns  1  35 5 weeks-decreased fetal movement noted today  Patient states the baby normally moves well, was slightly decreased yesterday and none this morning yet but it is still early  AUDREY 10 3, vertex, fetal heart tone 126    NST baseline 130, reactive, category 1 without decelerations or contractions noted     Fetal movement and  labor precautions were reviewed  Patient never did Glucola and labs  Laboratory sheet given for fasting glucose and CBC/RPR which she will do today as she has not eaten  Counseled about flu shot and Tdap, given today  Follow-up 1 week prenatal or as needed  COVID vaccination recommended  2  History of  delivery x2- 34 weeks after rupture membranes, emergency  at 33 weeks for bradycardia possible abruption  Patient had some step panic episode at that time as well  She never did take vaginal progesterone as recommended previously  3  Prior vaginal delivery/prior B-[id number] trial of labor  Risks and benefits were reviewed previously  4  Rh positive  5  COVID-noted 21

## 2022-01-06 NOTE — PROGRESS NOTES
Non-Stress Testing:    Non-Stress test, equipment, procedure, and expected outcomes reviewed  Reviewed fetal kick counts and when to call OB  Maurizio Burnett Verified patient understanding of fetal kick counts with teach back method  Patient reports feeling daily fetal movements  Patient has no questions or concerns

## 2022-01-06 NOTE — PROGRESS NOTES
Please refer to the [facility] ultrasound report in Ob Procedures for additional information regarding today's visit

## 2022-01-06 NOTE — PATIENT INSTRUCTIONS
Pregnancy at [address] to [address]:   What changes are happening with my body? You are considered full term at the beginning of 37 weeks  Your breathing may be easier if your baby has moved down into a head-down position  You may need to urinate more often because the baby may be pressing on your bladder  You may also feel more discomfort and get tired easily  How do I care for myself at this stage of my pregnancy? · Eat a variety of healthy foods  Healthy foods include fruits, vegetables, whole-grain breads, low-fat dairy foods, beans, lean meats, and fish  Drink liquids as directed  Ask how much liquid to drink each day and which liquids are best for you  Limit caffeine to less than 200 milligrams each day  Limit your intake of fish to 2 servings each week  Choose fish low in mercury such as canned light tuna, shrimp, salmon, cod, or tilapia  Do not  eat fish high in mercury such as swordfish, tilefish, logan mackerel, and shark  · Take prenatal vitamins as directed  Your need for certain vitamins and minerals, such as folic acid, increases during pregnancy  Prenatal vitamins provide some of the extra vitamins and minerals you need  Prenatal vitamins may also help to decrease the risk of certain birth defects  · Rest as needed  Put your feet up if you have swelling in your ankles and feet  · Talk to your healthcare provider about exercise  Moderate exercise can help you stay fit  Your healthcare provider will help you plan an exercise program that is safe for you during pregnancy  · Do not smoke  Smoking increases your risk of a miscarriage and other health problems during your pregnancy  Smoking can cause your baby to be born early or weigh less at birth  Ask your healthcare provider for information if you need help quitting  · Do not drink alcohol  Alcohol passes from your body to your baby through the placenta   It can affect your baby's brain development and cause fetal alcohol syndrome (FAS)  FAS is a group of conditions that causes mental, behavior, and growth problems  · Talk to your healthcare provider before you take any medicines  Many medicines may harm your baby if you take them when you are pregnant  Do not take any medicines, vitamins, herbs, or supplements without first talking to your healthcare provider  Never use illegal or street drugs (such as marijuana or cocaine) while you are pregnant  What are some safety tips during pregnancy? · Avoid hot tubs and saunas  Do not use a hot tub or sauna while you are pregnant, especially during your first trimester  Hot tubs and saunas may raise your baby's temperature and increase the risk of birth defects  · Avoid toxoplasmosis  This is an infection caused by eating raw meat or being around infected cat feces  It can cause birth defects, miscarriages, and other problems  Wash your hands after you touch raw meat  Make sure any meat is well-cooked before you eat it  Avoid raw eggs and unpasteurized milk  Use gloves or ask someone else to clean your cat's litter box while you are pregnant  · Ask your healthcare provider about travel  The most comfortable time to travel is during the second trimester  Ask your provider if you can travel after 36 weeks  You may not be able to travel in an airplane after 36 weeks  He or she may also recommend you avoid long road trips  What changes are happening with my baby? By 38 weeks, your baby may weigh between 6 and 9 pounds  Your baby may be about 14 inches long from the top of the head to the rump (baby's bottom)  Your baby hears well enough to know your voice  As your baby gets larger, you may feel fewer kicks and more stretching and rolling  Your baby may move into a head-down position  Your baby will also rest lower in your abdomen  What do I need to know about prenatal care?   Your healthcare provider will check your blood pressure and weight  You may also need the following:  · A urine test  may also be done to check for sugar and protein  These can be signs of gestational diabetes or infection  Protein in your urine may also be a sign of preeclampsia  Preeclampsia is a condition that can develop during week 20 or later of your pregnancy  It causes high blood pressure, and it can cause problems with your kidneys and other organs  · A blood test  may be done to check for anemia (low iron level)  · A Tdap vaccine  may be recommended by your healthcare provider  · A group B strep test  is a test that is done to check for group B strep infection  Group B strep is a type of bacteria that may be found in the vagina or rectum  It can be passed to your baby during delivery if you have it  Your healthcare provider will take swab your vagina or rectum and send the sample to the lab for tests  · Fundal height  is a measurement of your uterus to check your baby's growth  This number is usually the same as the number of weeks that you have been pregnant  Your healthcare provider may also check your baby's position  · Your baby's heart rate  will be checked  When should I seek immediate care? · You develop a severe headache that does not go away  · You have new or increased vision changes, such as blurred or spotted vision  · You have new or increased swelling in your face or hands  · You have vaginal spotting or bleeding  · Your water broke or you feel warm water gushing or trickling from your vagina  When should I call my obstetrician? · You have more than 5 contractions in 1 hour  · You notice any changes in your baby's movements  · You have abdominal cramps, pressure, or tightening  · You have a change in vaginal discharge  · You have chills or a fever  · You have vaginal itching, burning, or pain  · You have yellow, green, white, or foul-smelling vaginal discharge      · You have pain or burning when you urinate, less urine than usual, or pink or bloody urine  · You have questions or concerns about your condition or care  CARE AGREEMENT:   You have the right to help plan your care  Learn about your health condition and how it may be treated  Discuss treatment options with your healthcare providers to decide what care you want to receive  You always have the right to refuse treatment  The above information is an  only  It is not intended as medical advice for individual conditions or treatments  Talk to your doctor, nurse or pharmacist before following any medical regimen to see if it is safe and effective for you  © Copyright [employer] 2021 Information is for End User's use only and may not be sold, redistributed or otherwise used for commercial purposes   All illustrations and images included in CareNotes® are the copyrighted property of [employer]  or [address] [place], [place] [address]mirta

## 2022-01-10 ENCOUNTER — ULTRASOUND (OUTPATIENT)
Dept: OBGYN CLINIC | Facility: CLINIC | Age: 27
End: 2022-01-10
Payer: COMMERCIAL

## 2022-01-10 ENCOUNTER — ROUTINE PRENATAL (OUTPATIENT)
Dept: OBGYN CLINIC | Facility: CLINIC | Age: 27
End: 2022-01-10
Payer: COMMERCIAL

## 2022-01-10 VITALS
HEART RATE: 82 BPM | SYSTOLIC BLOOD PRESSURE: 108 MMHG | BODY MASS INDEX: 24.92 KG/M2 | DIASTOLIC BLOOD PRESSURE: 60 MMHG | WEIGHT: 127.6 LBS

## 2022-01-10 DIAGNOSIS — Z3A.36 36 WEEKS GESTATION OF PREGNANCY: ICD-10-CM

## 2022-01-10 DIAGNOSIS — Z34.93 THIRD TRIMESTER PREGNANCY: Primary | ICD-10-CM

## 2022-01-10 DIAGNOSIS — O36.5930 INTRAUTERINE GROWTH RESTRICTION AFFECTING ANTEPARTUM CARE OF MOTHER IN THIRD TRIMESTER, SINGLE OR UNSPECIFIED FETUS: ICD-10-CM

## 2022-01-10 LAB
SL AMB  POCT GLUCOSE, UA: NORMAL
SL AMB POCT URINE PROTEIN: NORMAL

## 2022-01-10 PROCEDURE — 59025 FETAL NON-STRESS TEST: CPT | Performed by: OBSTETRICS & GYNECOLOGY

## 2022-01-10 PROCEDURE — PNV: Performed by: OBSTETRICS & GYNECOLOGY

## 2022-01-10 NOTE — PATIENT INSTRUCTIONS
Pregnancy at 28 to [address]:   What changes are happening with my body? You are considered full term at the beginning of 37 weeks  Your breathing may be easier if your baby has moved down into a head-down position  You may need to urinate more often because the baby may be pressing on your bladder  You may also feel more discomfort and get tired easily  How do I care for myself at this stage of my pregnancy? · Eat a variety of healthy foods  Healthy foods include fruits, vegetables, whole-grain breads, low-fat dairy foods, beans, lean meats, and fish  Drink liquids as directed  Ask how much liquid to drink each day and which liquids are best for you  Limit caffeine to less than 200 milligrams each day  Limit your intake of fish to 2 servings each week  Choose fish low in mercury such as canned light tuna, shrimp, salmon, cod, or tilapia  Do not  eat fish high in mercury such as swordfish, tilefish, logan mackerel, and shark  · Take prenatal vitamins as directed  Your need for certain vitamins and minerals, such as folic acid, increases during pregnancy  Prenatal vitamins provide some of the extra vitamins and minerals you need  Prenatal vitamins may also help to decrease the risk of certain birth defects  · Rest as needed  Put your feet up if you have swelling in your ankles and feet  · Talk to your healthcare provider about exercise  Moderate exercise can help you stay fit  Your healthcare provider will help you plan an exercise program that is safe for you during pregnancy  · Do not smoke  Smoking increases your risk of a miscarriage and other health problems during your pregnancy  Smoking can cause your baby to be born early or weigh less at birth  Ask your healthcare provider for information if you need help quitting  · Do not drink alcohol  Alcohol passes from your body to your baby through the placenta   It can affect your baby's brain development and cause fetal alcohol syndrome (FAS)  FAS is a group of conditions that causes mental, behavior, and growth problems  · Talk to your healthcare provider before you take any medicines  Many medicines may harm your baby if you take them when you are pregnant  Do not take any medicines, vitamins, herbs, or supplements without first talking to your healthcare provider  Never use illegal or street drugs (such as marijuana or cocaine) while you are pregnant  What are some safety tips during pregnancy? · Avoid hot tubs and saunas  Do not use a hot tub or sauna while you are pregnant, especially during your first trimester  Hot tubs and saunas may raise your baby's temperature and increase the risk of birth defects  · Avoid toxoplasmosis  This is an infection caused by eating raw meat or being around infected cat feces  It can cause birth defects, miscarriages, and other problems  Wash your hands after you touch raw meat  Make sure any meat is well-cooked before you eat it  Avoid raw eggs and unpasteurized milk  Use gloves or ask someone else to clean your cat's litter box while you are pregnant  · Ask your healthcare provider about travel  The most comfortable time to travel is during the second trimester  Ask your provider if you can travel after 36 weeks  You may not be able to travel in an airplane after 36 weeks  He or she may also recommend you avoid long road trips  What changes are happening with my baby? By 38 weeks, your baby may weigh between 6 and 9 pounds  Your baby may be about 14 inches long from the top of the head to the rump (baby's bottom)  Your baby hears well enough to know your voice  As your baby gets larger, you may feel fewer kicks and more stretching and rolling  Your baby may move into a head-down position  Your baby will also rest lower in your abdomen  What do I need to know about prenatal care?   Your healthcare provider will check your blood pressure and weight  You may also need the following:  · A urine test  may also be done to check for sugar and protein  These can be signs of gestational diabetes or infection  Protein in your urine may also be a sign of preeclampsia  Preeclampsia is a condition that can develop during week 20 or later of your pregnancy  It causes high blood pressure, and it can cause problems with your kidneys and other organs  · A blood test  may be done to check for anemia (low iron level)  · A Tdap vaccine  may be recommended by your healthcare provider  · A group B strep test  is a test that is done to check for group B strep infection  Group B strep is a type of bacteria that may be found in the vagina or rectum  It can be passed to your baby during delivery if you have it  Your healthcare provider will take swab your vagina or rectum and send the sample to the lab for tests  · Fundal height  is a measurement of your uterus to check your baby's growth  This number is usually the same as the number of weeks that you have been pregnant  Your healthcare provider may also check your baby's position  · Your baby's heart rate  will be checked  When should I seek immediate care? · You develop a severe headache that does not go away  · You have new or increased vision changes, such as blurred or spotted vision  · You have new or increased swelling in your face or hands  · You have vaginal spotting or bleeding  · Your water broke or you feel warm water gushing or trickling from your vagina  When should I call my obstetrician? · You have more than 5 contractions in 1 hour  · You notice any changes in your baby's movements  · You have abdominal cramps, pressure, or tightening  · You have a change in vaginal discharge  · You have chills or a fever  · You have vaginal itching, burning, or pain  · You have yellow, green, white, or foul-smelling vaginal discharge      · You have pain or burning when you urinate, less urine than usual, or pink or bloody urine  · You have questions or concerns about your condition or care  CARE AGREEMENT:   You have the right to help plan your care  Learn about your health condition and how it may be treated  Discuss treatment options with your healthcare providers to decide what care you want to receive  You always have the right to refuse treatment  The above information is an  only  It is not intended as medical advice for individual conditions or treatments  Talk to your doctor, nurse or pharmacist before following any medical regimen to see if it is safe and effective for you  © Copyright [employer] 2021 Information is for End User's use only and may not be sold, redistributed or otherwise used for commercial purposes   All illustrations and images included in CareNotes® are the copyrighted property of [employer] , [employer]  or [address] [place], [place] [address]mirta

## 2022-01-10 NOTE — PROGRESS NOTES
Patient seen today for prenatal visit  1  36 2 weeks-good fetal movement noted today  Fetal movement and /labor precautions were reviewed  COVID vaccine was recommended  Patient never did do Glucola due to gap in insurance coverage  Last visit, fasting glucose, CBC, RPR was given but she did not do this either  Strongly recommended to get this done soon  To follow-up as scheduled  2  Fetal growth restriction-seen by Dr Liya Beltrán at [facility] on 22  Estimated fetal weight 2074 g, 4 lb 9 oz, 3rd percentile  AUDREY was 14 9  Dr Liya Beltrán recommended testing  NST today 140, reactive, category 1 with no decelerations, 1 contraction appreciated  To continue twice weekly NST with weekly AUDREY/Doppler study  Recommendation for delivery 38 -39 weeks  Tentatively scheduled for induction with Dr Poncho Castillo on 22  Risks and benefits were reviewed  Surprisingly, patient had significant interest in  today despite wanting trial of labor throughout the pregnancy  We again reviewed risks and benefits of trial of labor, notably risk of uterine rupture at 0 5-1%  Also, she is concerned about fetal growth restriction and possible fetal intolerance of labor  This is possible, but she will be monitored closely during any labor attempt  After discussion, she is interested in trial of labor  Scheduled on 22 at 0700 hours, plans for Pitocin, amniotomy and continuous monitoring  3  History of  delivery x2- 34 weeks after rupture membranes  Emergency  at 33 weeks for bradycardia, possible abruption  Patient also had some type of panic episode at that time as well  She never did take vaginal progesterone as recommended previously  4  Prior vaginal delivery/-trial of labor as noted above  5  Rh positive  6  COVID-noted with 21  She has resolved without sequelae  Vaccination was recommended for optimal immunization

## 2022-01-10 NOTE — Clinical Note
Hi all,  Patient at 36 2 weeks, diagnosed with fetal growth restriction by Dr Jenny Grady on MFM ultrasound 22  Delivery recommended 38 0 0-38 6 weeks  Scheduled for induction on 22 at 0700 hours, Dr Sherman Tatum is on-call for the group  History of vaginal delivery followed by   Wishes trial of labor  Cervix 3 cm dilated, plan Pitocin with amniotomy    Thanks, Claudeen Berry MD

## 2022-01-11 NOTE — TELEPHONE ENCOUNTER
Regarding: symptomatic-covid test-exposure/congestion  1 of 3  ----- Message from Lazarus Pale sent at 9/21/2021 11:12 AM EDT -----  "I am congested    My daughter had two exposures at school, since Friday " 58M, vaccined with [employer] for COVID-19, undomicilied, current smoker with PMH T2DM (insulin-dependent) c/b neuropathy and HTN as well as hx of multiple skin/soft tissue infections presenting with wound on R buttock and incidentally found to be COVID+

## 2022-01-12 ENCOUNTER — APPOINTMENT (OUTPATIENT)
Dept: LAB | Facility: HOSPITAL | Age: 27
End: 2022-01-12
Attending: OBSTETRICS & GYNECOLOGY
Payer: COMMERCIAL

## 2022-01-12 DIAGNOSIS — Z3A.35 35 WEEKS GESTATION OF PREGNANCY: ICD-10-CM

## 2022-01-12 LAB
ERYTHROCYTE [DISTWIDTH] IN BLOOD BY AUTOMATED COUNT: 13.3 % (ref 11.6–15.1)
GLUCOSE P FAST SERPL-MCNC: 73 MG/DL (ref 65–99)
HCT VFR BLD AUTO: 33 % (ref 34.8–46.1)
HGB BLD-MCNC: 11 G/DL (ref 11.5–15.4)
MCH RBC QN AUTO: 30.7 PG (ref 26.8–34.3)
MCHC RBC AUTO-ENTMCNC: 33.3 G/DL (ref 31.4–37.4)
MCV RBC AUTO: 92 FL (ref 82–98)
PLATELET # BLD AUTO: 185 THOUSANDS/UL (ref 149–390)
PMV BLD AUTO: 12.1 FL (ref 8.9–12.7)
RBC # BLD AUTO: 3.58 MILLION/UL (ref 3.81–5.12)
RPR SER QL: NORMAL
WBC # BLD AUTO: 11.26 THOUSAND/UL (ref 4.31–10.16)

## 2022-01-12 PROCEDURE — 82947 ASSAY GLUCOSE BLOOD QUANT: CPT

## 2022-01-12 PROCEDURE — 36415 COLL VENOUS BLD VENIPUNCTURE: CPT

## 2022-01-12 PROCEDURE — 85027 COMPLETE CBC AUTOMATED: CPT

## 2022-01-12 PROCEDURE — 86592 SYPHILIS TEST NON-TREP QUAL: CPT

## 2022-01-13 ENCOUNTER — ULTRASOUND (OUTPATIENT)
Dept: PERINATAL CARE | Facility: OTHER | Age: 27
End: 2022-01-13
Payer: COMMERCIAL

## 2022-01-13 VITALS
HEIGHT: 60 IN | SYSTOLIC BLOOD PRESSURE: 92 MMHG | WEIGHT: 129.2 LBS | DIASTOLIC BLOOD PRESSURE: 62 MMHG | HEART RATE: 101 BPM | BODY MASS INDEX: 25.36 KG/M2

## 2022-01-13 DIAGNOSIS — Z3A.36 36 WEEKS GESTATION OF PREGNANCY: ICD-10-CM

## 2022-01-13 DIAGNOSIS — O36.5930 INTRAUTERINE GROWTH RESTRICTION AFFECTING ANTEPARTUM CARE OF MOTHER IN THIRD TRIMESTER, SINGLE OR UNSPECIFIED FETUS: Primary | ICD-10-CM

## 2022-01-13 PROCEDURE — 76820 UMBILICAL ARTERY ECHO: CPT | Performed by: OBSTETRICS & GYNECOLOGY

## 2022-01-13 PROCEDURE — 76815 OB US LIMITED FETUS(S): CPT | Performed by: OBSTETRICS & GYNECOLOGY

## 2022-01-13 PROCEDURE — 59025 FETAL NON-STRESS TEST: CPT | Performed by: OBSTETRICS & GYNECOLOGY

## 2022-01-13 NOTE — PATIENT INSTRUCTIONS
Thank you for choosing us for your  care today  If you have any questions about your ultrasound or care, please do not hesitate to contact us or your primary obstetrician  Some general instructions for your pregnancy are:     Protect against coronavirus: get vaccinated and mask up  Pregnant women are increased risk of severe COVID  Notify your primary care doctor if you have any symptoms including cough, shortness of breath or difficulty breathing, fever, chills, muscle pain, sore throat, or loss of taste or smell   Exercise: Aim for 22 minutes per day (150 minutes per week) of regular exercise  Walking is great!  Nutrition: aim for calcium-rich and iron-rich foods as well as healthy sources of protein   Learn about Preeclampsia: preeclampsia is a common, serious high blood pressure complication in pregnancy  A blood pressure of 824QDPT (systolic or top number) or 35LCYX (diastolic or bottom number) is not normal and needs evaluation by your doctor  Aspirin is sometimes prescribed in early pregnancy to prevent preeclampsia in women with risk factors - ask your obstetrician if you should be on this medication   If you smoke, try to reduce how many cigarettes you smoke or try to quit completely  Do not vape   Other warning signs to watch out for in pregnancy or postpartum: chest pain, obstructed breathing or shortness of breath, seizures, thoughts of hurting yourself or your baby, bleeding, a painful or swollen leg, fever, or headache (see [employer] POST-BIRTH Warning Signs campaign)  If these happen call 911  Itching is also not normal in pregnancy and if you experience this, especially over your hands and feet, potentially worse at night, notify your doctors

## 2022-01-13 NOTE — PROGRESS NOTES
Repeat Non-Stress Testing:    Pt verbalizes +FM   Pt denies ALL:               Leaking of fluid   Vaginal bleeding   Decreased fetal movement

## 2022-01-16 NOTE — PROGRESS NOTES
[address]: Ms Kait Carrillo was seen today for umbilical artery Doppler study, AUDREY, and NST  See ultrasound report under "OB Procedures" tab  Please don't hesitate to contact our office with any concerns or questions    Chen Reddy MD

## 2022-01-17 ENCOUNTER — ULTRASOUND (OUTPATIENT)
Dept: OBGYN CLINIC | Facility: CLINIC | Age: 27
End: 2022-01-17
Payer: COMMERCIAL

## 2022-01-17 ENCOUNTER — ROUTINE PRENATAL (OUTPATIENT)
Dept: OBGYN CLINIC | Facility: CLINIC | Age: 27
End: 2022-01-17
Payer: COMMERCIAL

## 2022-01-17 VITALS
WEIGHT: 129 LBS | BODY MASS INDEX: 25.19 KG/M2 | DIASTOLIC BLOOD PRESSURE: 56 MMHG | SYSTOLIC BLOOD PRESSURE: 100 MMHG | HEART RATE: 71 BPM

## 2022-01-17 DIAGNOSIS — O36.5930 INTRAUTERINE GROWTH RESTRICTION AFFECTING ANTEPARTUM CARE OF MOTHER IN THIRD TRIMESTER, SINGLE OR UNSPECIFIED FETUS: ICD-10-CM

## 2022-01-17 DIAGNOSIS — Z86.2 HISTORY OF ANEMIA: ICD-10-CM

## 2022-01-17 DIAGNOSIS — Z3A.37 37 WEEKS GESTATION OF PREGNANCY: ICD-10-CM

## 2022-01-17 DIAGNOSIS — Z34.93 PREGNANT AND NOT YET DELIVERED IN THIRD TRIMESTER: ICD-10-CM

## 2022-01-17 DIAGNOSIS — Z28.21 COVID-19 VACCINATION DECLINED: ICD-10-CM

## 2022-01-17 DIAGNOSIS — O09.899 HISTORY OF PRETERM DELIVERY, CURRENTLY PREGNANT: ICD-10-CM

## 2022-01-17 LAB
SL AMB  POCT GLUCOSE, UA: NORMAL
SL AMB POCT URINE PROTEIN: NORMAL

## 2022-01-17 PROCEDURE — 59025 FETAL NON-STRESS TEST: CPT | Performed by: OBSTETRICS & GYNECOLOGY

## 2022-01-17 PROCEDURE — PNV: Performed by: OBSTETRICS & GYNECOLOGY

## 2022-01-17 RX ORDER — FERROUS SULFATE TAB EC 324 MG (65 MG FE EQUIVALENT) 324 (65 FE) MG
324 TABLET DELAYED RESPONSE ORAL
Qty: 30 TABLET | Refills: 3 | Status: SHIPPED | OUTPATIENT
Start: 2022-01-17 | End: 2022-01-26 | Stop reason: HOSPADM

## 2022-01-17 NOTE — PROGRESS NOTES
IUP at 37 weeks and 2 days  Patient reports positive fetal movements  Here today for fetal nonstress test in the setting of fetal growth restriction  NST was reactive, she does have occasional contractions denies leakage of fluid or bleeding  Her cervix was 3/80/0  Reviewed signs of  labor kick counts  She has a follow-up growth ultrasound with Dopplers this Thursday  She is scheduled for induction of labor, TOLAC on 22  Risks and benefits have been previously reviewed all questions answered  She has been given information on advanced pregnancy guidelines and perineal massage     History of  x1

## 2022-01-19 ENCOUNTER — TELEPHONE (OUTPATIENT)
Dept: OBGYN CLINIC | Facility: CLINIC | Age: 27
End: 2022-01-19

## 2022-01-19 NOTE — TELEPHONE ENCOUNTER
Patient's employer  [employer] called  They need more details about when patient can begin her maternity leave  She was given a note that says she can be out of work on 1/17/22 but that her [address] and induction date are after that  They need a revised note with specific dates  Patient works for [employer], phone [phone], Fax [phone]  Colette Broussard gave a note but it is vague  They want to know when patient should stop working for a medical reason

## 2022-01-19 NOTE — TELEPHONE ENCOUNTER
This patient is high-risk OB history of  section x1 with fetal growth restriction  Recommended discontinue working on or about the     (Please discussed with the patient if that was her intention to stop work on the ) Recommend induction of labor on  for those indications

## 2022-01-19 NOTE — PROGRESS NOTES
Please refer to the [facility] ultrasound report in Ob Procedures for additional information regarding today's visit

## 2022-01-20 ENCOUNTER — ROUTINE PRENATAL (OUTPATIENT)
Dept: PERINATAL CARE | Facility: OTHER | Age: 27
End: 2022-01-20
Payer: COMMERCIAL

## 2022-01-20 ENCOUNTER — ULTRASOUND (OUTPATIENT)
Dept: PERINATAL CARE | Facility: OTHER | Age: 27
End: 2022-01-20
Payer: COMMERCIAL

## 2022-01-20 VITALS
HEIGHT: 60 IN | WEIGHT: 129.2 LBS | DIASTOLIC BLOOD PRESSURE: 69 MMHG | HEART RATE: 89 BPM | SYSTOLIC BLOOD PRESSURE: 94 MMHG | BODY MASS INDEX: 25.36 KG/M2

## 2022-01-20 DIAGNOSIS — O36.5930 INTRAUTERINE GROWTH RESTRICTION AFFECTING ANTEPARTUM CARE OF MOTHER IN THIRD TRIMESTER, SINGLE OR UNSPECIFIED FETUS: Primary | ICD-10-CM

## 2022-01-20 DIAGNOSIS — Z3A.37 37 WEEKS GESTATION OF PREGNANCY: ICD-10-CM

## 2022-01-20 PROCEDURE — 59025 FETAL NON-STRESS TEST: CPT | Performed by: OBSTETRICS & GYNECOLOGY

## 2022-01-20 PROCEDURE — 76820 UMBILICAL ARTERY ECHO: CPT | Performed by: OBSTETRICS & GYNECOLOGY

## 2022-01-20 PROCEDURE — 76816 OB US FOLLOW-UP PER FETUS: CPT | Performed by: OBSTETRICS & GYNECOLOGY

## 2022-01-20 PROCEDURE — NC001 PR NO CHARGE: Performed by: OBSTETRICS & GYNECOLOGY

## 2022-01-20 NOTE — PATIENT INSTRUCTIONS
Kick Counts in Pregnancy   WHAT YOU NEED TO KNOW:   Kick counts measure how much your baby is moving in your womb  A kick from your baby can be felt as a twist, turn, swish, roll, or jab  It is common to feel your baby kicking at 26 to 28 weeks of pregnancy  You may feel your baby kick as early as 20 weeks of pregnancy  You may want to start counting at 28 weeks  DISCHARGE INSTRUCTIONS:   Contact your doctor immediately if:   · You feel a change in the number of kicks or movements of your baby  · You feel fewer than 10 kicks within 2 hours  · You have questions or concerns about your baby's movements  Why measure kick counts:  Your baby's movement may provide information about your baby's health  He or she may move less, or not at all, if there are problems  Your baby may move less if he or she is not getting enough oxygen or nutrition from the placenta  Do not smoke while you are pregnant  Smoking decreases the amount of oxygen that gets to your baby  Talk to your healthcare provider if you need help to quit smoking  Tell your healthcare provider as soon as you feel a change in your baby's movements  When to measure kick counts:   · Measure kick counts at the same time every day  · Measure kick counts when your baby is awake and most active  Your baby may be most active in the evening  How to measure kick counts:  Check that your baby is awake before you measure kick counts  You can wake up your baby by lightly pushing on your belly, walking, or drinking something cold  Your healthcare provider may tell you different ways to measure kick counts  You may be told to do the following:  · Use a chart or clock to keep track of the time you start and finish counting  · Sit in a chair or lie on your left side  · Place your hands on the largest part of your belly  · Count until you reach 10 kicks  Write down how much time it takes to count 10 kicks       · It may take 30 minutes to 2 hours to count 10 kicks  It should not take more than 2 hours to count 10 kicks  Follow up with your doctor as directed:  Write down your questions so you remember to ask them during your visits  © Copyright [employer] 2021 Information is for End User's use only and may not be sold, redistributed or otherwise used for commercial purposes  All illustrations and images included in CareNotes® are the copyrighted property of A D A M , Inc  or [facility] Mitchell Aguilar   The above information is an  only  It is not intended as medical advice for individual conditions or treatments  Talk to your doctor, nurse or pharmacist before following any medical regimen to see if it is safe and effective for you

## 2022-01-20 NOTE — LETTER
January 20, 2022     Jessica Bass MD  [address] [place] [address] [address] [facility]    Patient: Luis A Robles   YOB: 1995   Date of Visit: 1/20/2022       Dear Dr Heather Linares: Thank you for referring You Brandon to me for evaluation  Below are my notes for this consultation  If you have questions, please do not hesitate to call me  I look forward to following your patient along with you  Sincerely,        Shahrzad Willoughby MD        CC: No Recipients  Shahrzad Willoughby MD  1/19/2022  5:44 PM  Sign when Signing Visit  Please refer to the [facility] ultrasound report in Ob Procedures for additional information regarding today's visit

## 2022-01-24 ENCOUNTER — HOSPITAL ENCOUNTER (INPATIENT)
Facility: HOSPITAL | Age: 27
LOS: 2 days | Discharge: HOME | End: 2022-01-26
Attending: OBSTETRICS & GYNECOLOGY | Admitting: OBSTETRICS & GYNECOLOGY
Payer: COMMERCIAL

## 2022-01-24 ENCOUNTER — ANESTHESIA EVENT (INPATIENT)
Dept: ANESTHESIOLOGY | Facility: HOSPITAL | Age: 27
End: 2022-01-24
Payer: COMMERCIAL

## 2022-01-24 ENCOUNTER — ANESTHESIA (INPATIENT)
Dept: ANESTHESIOLOGY | Facility: HOSPITAL | Age: 27
End: 2022-01-24
Payer: COMMERCIAL

## 2022-01-24 ENCOUNTER — HOSPITAL ENCOUNTER (OUTPATIENT)
Dept: LABOR AND DELIVERY | Facility: HOSPITAL | Age: 27
Discharge: HOME | End: 2022-01-24
Payer: COMMERCIAL

## 2022-01-24 DIAGNOSIS — Z3A.33 33 WEEKS GESTATION OF PREGNANCY: ICD-10-CM

## 2022-01-24 DIAGNOSIS — O34.219 VBAC, DELIVERED: ICD-10-CM

## 2022-01-24 DIAGNOSIS — Z3A.38 38 WEEKS GESTATION OF PREGNANCY: Primary | ICD-10-CM

## 2022-01-24 LAB
ABO GROUP BLD: NORMAL
BASE EXCESS BLDCOA CALC-SCNC: -5.9 MMOL/L (ref 3–11)
BASE EXCESS BLDCOV CALC-SCNC: -1.9 MMOL/L (ref 1–9)
BASOPHILS # BLD AUTO: 0.03 THOUSANDS/ΜL (ref 0–0.1)
BASOPHILS # BLD AUTO: 0.05 THOUSANDS/ΜL (ref 0–0.1)
BASOPHILS NFR BLD AUTO: 0 % (ref 0–1)
BASOPHILS NFR BLD AUTO: 0 % (ref 0–1)
BLD GP AB SCN SERPL QL: NEGATIVE
EOSINOPHIL # BLD AUTO: 0.03 THOUSAND/ΜL (ref 0–0.61)
EOSINOPHIL # BLD AUTO: 0.08 THOUSAND/ΜL (ref 0–0.61)
EOSINOPHIL NFR BLD AUTO: 0 % (ref 0–6)
EOSINOPHIL NFR BLD AUTO: 1 % (ref 0–6)
ERYTHROCYTE [DISTWIDTH] IN BLOOD BY AUTOMATED COUNT: 13.3 % (ref 11.6–15.1)
ERYTHROCYTE [DISTWIDTH] IN BLOOD BY AUTOMATED COUNT: 13.3 % (ref 11.6–15.1)
GLUCOSE SERPL-MCNC: 66 MG/DL (ref 65–140)
HCO3 BLDCOA-SCNC: 20.9 MMOL/L (ref 17.3–27.3)
HCO3 BLDCOV-SCNC: 23.1 MMOL/L (ref 12.2–28.6)
HCT VFR BLD AUTO: 32.3 % (ref 34.8–46.1)
HCT VFR BLD AUTO: 35.7 % (ref 34.8–46.1)
HGB BLD-MCNC: 10.9 G/DL (ref 11.5–15.4)
HGB BLD-MCNC: 11.8 G/DL (ref 11.5–15.4)
IMM GRANULOCYTES # BLD AUTO: 0.14 THOUSAND/UL (ref 0–0.2)
IMM GRANULOCYTES # BLD AUTO: 0.15 THOUSAND/UL (ref 0–0.2)
IMM GRANULOCYTES NFR BLD AUTO: 1 % (ref 0–2)
IMM GRANULOCYTES NFR BLD AUTO: 1 % (ref 0–2)
LYMPHOCYTES # BLD AUTO: 2 THOUSANDS/ΜL (ref 0.6–4.47)
LYMPHOCYTES # BLD AUTO: 2.17 THOUSANDS/ΜL (ref 0.6–4.47)
LYMPHOCYTES NFR BLD AUTO: 13 % (ref 14–44)
LYMPHOCYTES NFR BLD AUTO: 15 % (ref 14–44)
MCH RBC QN AUTO: 31.1 PG (ref 26.8–34.3)
MCH RBC QN AUTO: 31.1 PG (ref 26.8–34.3)
MCHC RBC AUTO-ENTMCNC: 33.1 G/DL (ref 31.4–37.4)
MCHC RBC AUTO-ENTMCNC: 33.7 G/DL (ref 31.4–37.4)
MCV RBC AUTO: 92 FL (ref 82–98)
MCV RBC AUTO: 94 FL (ref 82–98)
MONOCYTES # BLD AUTO: 1.22 THOUSAND/ΜL (ref 0.17–1.22)
MONOCYTES # BLD AUTO: 1.41 THOUSAND/ΜL (ref 0.17–1.22)
MONOCYTES NFR BLD AUTO: 8 % (ref 4–12)
MONOCYTES NFR BLD AUTO: 9 % (ref 4–12)
NEUTROPHILS # BLD AUTO: 13.43 THOUSANDS/ΜL (ref 1.85–7.62)
NEUTROPHILS # BLD AUTO: 9.5 THOUSANDS/ΜL (ref 1.85–7.62)
NEUTS SEG NFR BLD AUTO: 74 % (ref 43–75)
NEUTS SEG NFR BLD AUTO: 78 % (ref 43–75)
NRBC BLD AUTO-RTO: 0 /100 WBCS
NRBC BLD AUTO-RTO: 0 /100 WBCS
O2 CT VFR BLDCOA CALC: 10.8 ML/DL
OXYHGB MFR BLDCOA: 49.1 %
OXYHGB MFR BLDCOV: 59.6 %
PCO2 BLDCOA: 45.3 MM[HG] (ref 30–60)
PCO2 BLDCOV: 40.3 MM HG (ref 27–43)
PH BLDCOA: 7.28 [PH] (ref 7.23–7.43)
PH BLDCOV: 7.38 [PH] (ref 7.19–7.49)
PLATELET # BLD AUTO: 172 THOUSANDS/UL (ref 149–390)
PLATELET # BLD AUTO: 187 THOUSANDS/UL (ref 149–390)
PMV BLD AUTO: 11.8 FL (ref 8.9–12.7)
PMV BLD AUTO: 12.4 FL (ref 8.9–12.7)
PO2 BLDCOA: 21.7 MM HG (ref 5–25)
PO2 BLDCOV: 22.4 MM HG (ref 15–45)
RBC # BLD AUTO: 3.51 MILLION/UL (ref 3.81–5.12)
RBC # BLD AUTO: 3.8 MILLION/UL (ref 3.81–5.12)
RH BLD: POSITIVE
SAO2 % BLDCOV: 12.5 ML/DL
SPECIMEN EXPIRATION DATE: NORMAL
WBC # BLD AUTO: 12.98 THOUSAND/UL (ref 4.31–10.16)
WBC # BLD AUTO: 17.23 THOUSAND/UL (ref 4.31–10.16)

## 2022-01-24 PROCEDURE — 88307 TISSUE EXAM BY PATHOLOGIST: CPT | Performed by: SPECIALIST

## 2022-01-24 PROCEDURE — 86900 BLOOD TYPING SEROLOGIC ABO: CPT

## 2022-01-24 PROCEDURE — NC001 PR NO CHARGE: Performed by: OBSTETRICS & GYNECOLOGY

## 2022-01-24 PROCEDURE — 4A1HXCZ MONITORING OF PRODUCTS OF CONCEPTION, CARDIAC RATE, EXTERNAL APPROACH: ICD-10-PCS | Performed by: OBSTETRICS & GYNECOLOGY

## 2022-01-24 PROCEDURE — 82948 REAGENT STRIP/BLOOD GLUCOSE: CPT

## 2022-01-24 PROCEDURE — 85025 COMPLETE CBC W/AUTO DIFF WBC: CPT

## 2022-01-24 PROCEDURE — 85025 COMPLETE CBC W/AUTO DIFF WBC: CPT | Performed by: OBSTETRICS & GYNECOLOGY

## 2022-01-24 PROCEDURE — 86901 BLOOD TYPING SEROLOGIC RH(D): CPT

## 2022-01-24 PROCEDURE — 82805 BLOOD GASES W/O2 SATURATION: CPT | Performed by: OBSTETRICS & GYNECOLOGY

## 2022-01-24 PROCEDURE — 86850 RBC ANTIBODY SCREEN: CPT

## 2022-01-24 PROCEDURE — 86592 SYPHILIS TEST NON-TREP QUAL: CPT

## 2022-01-24 PROCEDURE — 10H07YZ INSERTION OF OTHER DEVICE INTO PRODUCTS OF CONCEPTION, VIA NATURAL OR ARTIFICIAL OPENING: ICD-10-PCS

## 2022-01-24 PROCEDURE — 0UQMXZZ REPAIR VULVA, EXTERNAL APPROACH: ICD-10-PCS | Performed by: OBSTETRICS & GYNECOLOGY

## 2022-01-24 RX ORDER — DOCUSATE SODIUM 100 MG/1
100 CAPSULE, LIQUID FILLED ORAL 2 TIMES DAILY
Status: DISCONTINUED | OUTPATIENT
Start: 2022-01-24 | End: 2022-01-26 | Stop reason: HOSPADM

## 2022-01-24 RX ORDER — ACETAMINOPHEN 325 MG/1
650 TABLET ORAL EVERY 6 HOURS PRN
Status: DISCONTINUED | OUTPATIENT
Start: 2022-01-24 | End: 2022-01-24

## 2022-01-24 RX ORDER — ONDANSETRON 2 MG/ML
4 INJECTION INTRAMUSCULAR; INTRAVENOUS EVERY 6 HOURS PRN
Status: DISCONTINUED | OUTPATIENT
Start: 2022-01-24 | End: 2022-01-24

## 2022-01-24 RX ORDER — METOCLOPRAMIDE 10 MG/1
10 TABLET ORAL EVERY 6 HOURS
Status: DISCONTINUED | OUTPATIENT
Start: 2022-01-24 | End: 2022-01-24

## 2022-01-24 RX ORDER — METHYLERGONOVINE MALEATE 0.2 MG/ML
INJECTION INTRAVENOUS
Status: COMPLETED
Start: 2022-01-24 | End: 2022-01-24

## 2022-01-24 RX ORDER — SODIUM CHLORIDE, SODIUM LACTATE, POTASSIUM CHLORIDE, CALCIUM CHLORIDE 600; 310; 30; 20 MG/100ML; MG/100ML; MG/100ML; MG/100ML
125 INJECTION, SOLUTION INTRAVENOUS CONTINUOUS
Status: DISCONTINUED | OUTPATIENT
Start: 2022-01-24 | End: 2022-01-24

## 2022-01-24 RX ORDER — IBUPROFEN 600 MG/1
600 TABLET ORAL EVERY 6 HOURS PRN
Status: DISCONTINUED | OUTPATIENT
Start: 2022-01-24 | End: 2022-01-26 | Stop reason: HOSPADM

## 2022-01-24 RX ORDER — ONDANSETRON 2 MG/ML
4 INJECTION INTRAMUSCULAR; INTRAVENOUS EVERY 8 HOURS PRN
Status: DISCONTINUED | OUTPATIENT
Start: 2022-01-24 | End: 2022-01-26 | Stop reason: HOSPADM

## 2022-01-24 RX ORDER — DIAPER,BRIEF,INFANT-TODD,DISP
1 EACH MISCELLANEOUS DAILY PRN
Status: DISCONTINUED | OUTPATIENT
Start: 2022-01-24 | End: 2022-01-26 | Stop reason: HOSPADM

## 2022-01-24 RX ORDER — ROPIVACAINE HYDROCHLORIDE 2 MG/ML
INJECTION, SOLUTION EPIDURAL; INFILTRATION; PERINEURAL AS NEEDED
Status: DISCONTINUED | OUTPATIENT
Start: 2022-01-24 | End: 2022-01-24 | Stop reason: HOSPADM

## 2022-01-24 RX ORDER — OXYTOCIN/RINGER'S LACTATE 30/500 ML
250 PLASTIC BAG, INJECTION (ML) INTRAVENOUS ONCE
Status: DISCONTINUED | OUTPATIENT
Start: 2022-01-24 | End: 2022-01-26 | Stop reason: HOSPADM

## 2022-01-24 RX ORDER — CARBOPROST TROMETHAMINE 250 UG/ML
INJECTION, SOLUTION INTRAMUSCULAR
Status: DISCONTINUED
Start: 2022-01-24 | End: 2022-01-24 | Stop reason: WASHOUT

## 2022-01-24 RX ORDER — ACETAMINOPHEN 325 MG/1
650 TABLET ORAL EVERY 4 HOURS PRN
Status: DISCONTINUED | OUTPATIENT
Start: 2022-01-24 | End: 2022-01-26 | Stop reason: HOSPADM

## 2022-01-24 RX ORDER — LIDOCAINE HYDROCHLORIDE AND EPINEPHRINE 15; 5 MG/ML; UG/ML
INJECTION, SOLUTION EPIDURAL
Status: COMPLETED | OUTPATIENT
Start: 2022-01-24 | End: 2022-01-24

## 2022-01-24 RX ORDER — CALCIUM CARBONATE 200(500)MG
1000 TABLET,CHEWABLE ORAL DAILY PRN
Status: DISCONTINUED | OUTPATIENT
Start: 2022-01-24 | End: 2022-01-24

## 2022-01-24 RX ORDER — METHYLERGONOVINE MALEATE 0.2 MG/ML
0.2 INJECTION INTRAVENOUS ONCE
Status: COMPLETED | OUTPATIENT
Start: 2022-01-24 | End: 2022-01-24

## 2022-01-24 RX ORDER — DIPHENHYDRAMINE HYDROCHLORIDE 50 MG/ML
25 INJECTION INTRAMUSCULAR; INTRAVENOUS EVERY 6 HOURS PRN
Status: DISCONTINUED | OUTPATIENT
Start: 2022-01-24 | End: 2022-01-25

## 2022-01-24 RX ORDER — OXYTOCIN/RINGER'S LACTATE 30/500 ML
1-30 PLASTIC BAG, INJECTION (ML) INTRAVENOUS
Status: DISCONTINUED | OUTPATIENT
Start: 2022-01-24 | End: 2022-01-24

## 2022-01-24 RX ORDER — CALCIUM CARBONATE 200(500)MG
1000 TABLET,CHEWABLE ORAL DAILY PRN
Status: DISCONTINUED | OUTPATIENT
Start: 2022-01-24 | End: 2022-01-26 | Stop reason: HOSPADM

## 2022-01-24 RX ORDER — ROPIVACAINE HYDROCHLORIDE 2 MG/ML
INJECTION, SOLUTION EPIDURAL; INFILTRATION; PERINEURAL
Status: COMPLETED
Start: 2022-01-24 | End: 2022-01-24

## 2022-01-24 RX ADMIN — ROPIVACAINE HYDROCHLORIDE: 2 INJECTION, SOLUTION EPIDURAL; INFILTRATION at 17:52

## 2022-01-24 RX ADMIN — ROPIVACAINE HYDROCHLORIDE: 2 INJECTION, SOLUTION EPIDURAL; INFILTRATION at 10:05

## 2022-01-24 RX ADMIN — ONDANSETRON 4 MG: 2 INJECTION INTRAMUSCULAR; INTRAVENOUS at 22:56

## 2022-01-24 RX ADMIN — SODIUM CHLORIDE, SODIUM LACTATE, POTASSIUM CHLORIDE, AND CALCIUM CHLORIDE 125 ML/HR: .6; .31; .03; .02 INJECTION, SOLUTION INTRAVENOUS at 14:52

## 2022-01-24 RX ADMIN — SODIUM CHLORIDE, SODIUM LACTATE, POTASSIUM CHLORIDE, AND CALCIUM CHLORIDE 999 ML/HR: .6; .31; .03; .02 INJECTION, SOLUTION INTRAVENOUS at 09:35

## 2022-01-24 RX ADMIN — LIDOCAINE HYDROCHLORIDE AND EPINEPHRINE 3 ML: 15; 5 INJECTION, SOLUTION EPIDURAL at 09:50

## 2022-01-24 RX ADMIN — SODIUM CHLORIDE, SODIUM LACTATE, POTASSIUM CHLORIDE, AND CALCIUM CHLORIDE 125 ML/HR: .6; .31; .03; .02 INJECTION, SOLUTION INTRAVENOUS at 08:15

## 2022-01-24 RX ADMIN — ROPIVACAINE HYDROCHLORIDE 4 ML: 2 INJECTION, SOLUTION EPIDURAL; INFILTRATION; PERINEURAL at 09:56

## 2022-01-24 RX ADMIN — METHYLERGONOVINE MALEATE 0.2 MG: 0.2 INJECTION INTRAVENOUS at 21:17

## 2022-01-24 RX ADMIN — METOCLOPRAMIDE 10 MG: 10 TABLET ORAL at 09:11

## 2022-01-24 RX ADMIN — SODIUM CHLORIDE, SODIUM LACTATE, POTASSIUM CHLORIDE, AND CALCIUM CHLORIDE 999 ML/HR: .6; .31; .03; .02 INJECTION, SOLUTION INTRAVENOUS at 22:15

## 2022-01-24 RX ADMIN — Medication 2 MILLI-UNITS/MIN: at 08:28

## 2022-01-24 RX ADMIN — ROPIVACAINE HYDROCHLORIDE 9 ML/HR: 2 INJECTION, SOLUTION EPIDURAL; INFILTRATION; PERINEURAL at 09:59

## 2022-01-24 RX ADMIN — ROPIVACAINE HYDROCHLORIDE 4 ML: 2 INJECTION, SOLUTION EPIDURAL; INFILTRATION; PERINEURAL at 09:54

## 2022-01-24 RX ADMIN — PRENATAL VIT W/ FE FUMARATE-FA TAB 27-0.8 MG 1 TABLET: 27-0.8 TAB at 09:11

## 2022-01-24 RX ADMIN — METHYLERGONOVINE MALEATE 0.2 MG: 0.2 INJECTION, SOLUTION INTRAMUSCULAR; INTRAVENOUS at 21:17

## 2022-01-24 NOTE — ANESTHESIA PREPROCEDURE EVALUATION
Procedure:  LABOR ANALGESIA    Relevant Problems   GYN   (+) 38 weeks gestation of pregnancy      NEURO/PSYCH   (+) History of anemia   (+) History of  delivery, currently pregnant        Physical Exam    Airway    Mallampati score: II  TM Distance: >3 FB  Neck ROM: full     Dental       Cardiovascular  Cardiovascular exam normal    Pulmonary  Pulmonary exam normal     Other Findings        Anesthesia Plan  ASA Score- 2     Anesthesia Type- epidural with ASA Monitors  Additional Monitors:   Airway Plan:           Plan Factors-    Chart reviewed  Existing labs reviewed  Patient is not a current smoker  Patient instructed to abstain from smoking on day of procedure  Patient did not smoke on day of surgery  Obstructive sleep apnea risk education given perioperatively  Induction-     Postoperative Plan-     Informed Consent- Anesthetic plan and risks discussed with patient

## 2022-01-24 NOTE — OB LABOR/OXYTOCIN SAFETY PROGRESS
Oxytocin Safety Progress Check Note - Salvador Feng 31 y o  female MRN: [id number]    Unit/Bed#: L&D 325-01 Encounter: [id number]    Dose (pauly-units/min) Oxytocin: 16 pauly-units/min  Contraction Frequency (minutes): 2-4  Contraction Quality: Mild  Tachysystole: No   Cervical Dilation: 4        Cervical Effacement: 80  Fetal Station: -3  Baseline Rate: 135 bpm  Fetal Heart Rate: 135 BPM  FHR Category: Category I               Vital Signs:   Vitals:    01/24/22 1357   BP: 120/61   Pulse: 93   Resp:    Temp:            Notes/comments:   SVE unchanged  FHT category 1 with contractions every 1-3 min  Pit running at 16, continue to titrate to contractions  Dr Collins Guy aware         Travis Luong MD 1/24/2022 2:24 PM

## 2022-01-24 NOTE — ANESTHESIA PROCEDURE NOTES
Epidural Block    Patient location during procedure: OB  Start time: 1/24/2022 9:48 AM  Reason for block: procedure for pain and at surgeon's request  Staffing  Performed: Anesthesiologist   Anesthesiologist: Sera Bush DO  Preanesthetic Checklist  Completed: patient identified, IV checked, site marked, risks and benefits discussed, surgical consent, monitors and equipment checked, pre-op evaluation and timeout performed  Epidural  Patient position: sitting  Prep: Betadine  Patient monitoring: frequent blood pressure checks and continuous pulse ox  Approach: midline  Location: lumbar  Injection technique: SHAUNA air  Needle  Needle type: Tuohy   Needle gauge: 18 G  Catheter type: side hole  Catheter size: 20 G  Catheter at skin depth: 10 cm  Catheter securement method: clear occlusive dressing  Test dose: negativelidocaine 1 5% with epinephrine 1:200,000 test dose, 3 mL  Assessment  Number of attempts: 1negative aspiration for CSF, negative aspiration for heme and no paresthesia on injection  patient tolerated the procedure well with no immediate complications

## 2022-01-24 NOTE — OB LABOR/OXYTOCIN SAFETY PROGRESS
Oxytocin Safety Progress Check Note - Makayla Gonzalez 31 y o  female MRN: [id number]    Unit/Bed#: L&D 325-01 Encounter: [id number]    Dose (pauly-units/min) Oxytocin: 14 pauly-units/min  Contraction Frequency (minutes): 2-3  Contraction Quality: Mild  Tachysystole: No   Cervical Dilation: 4        Cervical Effacement: 80  Fetal Station: -3  Baseline Rate: 145 bpm  Fetal Heart Rate: 135 BPM  FHR Category: Category I               Vital Signs:   Vitals:    01/24/22 1159   BP:    Pulse:    Resp:    Temp: 97 6 °F (36 4 °C)           Notes/comments:    SVE 4/80/-3  SROM appreciated by nursing, clear on presentation and confirmed on SVE  [place] q2-3m, pit at 14 mU/min  Continue titration as tolerated  Dr Yancey Cease aware      Tirso Jones MD 1/24/2022 12:03 PM

## 2022-01-24 NOTE — H&P
H&P Exam - Obstetrics   Moody Delgado 31 y o  female MRN: [id number]  Unit/Bed#: L&D 325-01 Encounter: [id number]      History of Present Illness     Chief Complaint: Induction of labor    HPI:  Moody Delgado is a 31 y o  [id number] female with an [place] of 2022, by Last Menstrual Period at 38w2d weeks gestation who is being admitted for induction of labor for intrauterine fetal growth restriction  Most recent ultrasound on 2022 confirmed continued IUGR with a EFW 4% 5lbs 5oz and associated AC <3% HC <5% Femur <5%  Contractions: denies  Loss of fluid: denies  Vaginal bleeding: denies  Fetal movement: present    She is a [address] [place] [address] patient  PREGNANCY COMPLICATIONS:   1) Intrauterine growth restriction  2) H/o anemia  3) H/o  section  4) H/o  delivery    OB History    Para Term  AB Living   7 2 0 2 4 2   SAB IAB Ectopic Multiple Live Births   2 2     2      # Outcome Date GA Lbr Mo/2nd Weight Sex Delivery Anes PTL Lv   7 Current            6 SAB      SAB      5 SAB      SAB      4 IAB 2018           3   33w1d    CS-LTranv   OLLIE      Birth Comments: Fetal bradycardia per [facility] chart (pre noted "Had anxiety attack affecting fetal status, had   ")   2 IAB            1   34w5d    Vag-Spont   OLLIE       Baby complications/comments: IUGR, normal dopplers    ROS:  Constitutional: Negative  Respiratory: Negative  Cardiovascular: Negative    Gastrointestinal: Negative    Historical Information   Past Medical History:   Diagnosis Date    Anemia     Former smoker     Stop smoking 2020    Miscarriage     Varicella     vaccine     Past Surgical History:   Procedure Laterality Date     SECTION      Patient states anxiety attack which affected fetal tracing necessitating       DILATION AND EVACUATION  2016    Elective termination    DILATION AND EVACUATION  2018    Persistent Missed pregnancy    PA SURG RX MISSED ABORTN,1ST TRI N/A 12/30/2020    Procedure: DILATATION AND EVACUATION (D&E) ( 8 OF WEEKS); Surgeon: Taniya Hopkins MD;  Location: [facility];  Service: Gynecology    WISDOM TOOTH EXTRACTION       Social History   Social History     Substance and Sexual Activity   Alcohol Use Not Currently     Social History     Substance and Sexual Activity   Drug Use No     Social History     Tobacco Use   Smoking Status Former Smoker    Types: Cigarettes   Smokeless Tobacco Former User   Tobacco Comment    quit 09/2020     Family History:   Family History   Problem Relation Age of Onset    Depression Mother     Hypertension Father     Diabetes Father     Asthma Brother     Hyperlipidemia Brother        Meds/Allergies      Medications Prior to Admission   Medication    acetaminophen (TYLENOL) 325 mg tablet    ferrous sulfate 324 (65 Fe) mg    metoclopramide (REGLAN) 10 mg tablet    ondansetron (ZOFRAN) 4 mg tablet    Prenatal Vit-Fe Fumarate-FA (PRENATAL VITAMIN PO)        Allergies   Allergen Reactions    Penicillins Hives       OBJECTIVE:    Vitals: Last menstrual period 05/01/2021  There is no height or weight on file to calculate BMI  Physical Exam  Vitals and nursing note reviewed  Exam conducted with a chaperone present  Constitutional:       General: She is not in acute distress  HENT:      Head: Normocephalic  Right Ear: External ear normal       Left Ear: External ear normal    Eyes:      General: No scleral icterus  Right eye: No discharge  Left eye: No discharge  Conjunctiva/sclera: Conjunctivae normal    Cardiovascular:      Rate and Rhythm: Normal rate and regular rhythm  Pulses: Normal pulses  Heart sounds: Normal heart sounds  Pulmonary:      Effort: Pulmonary effort is normal  No respiratory distress  Breath sounds: Normal breath sounds  Abdominal:      Palpations: Abdomen is soft  Tenderness: There is no abdominal tenderness   There is no guarding  Comments: Gravid uterus   Genitourinary:     General: Normal vulva  Musculoskeletal:         General: No swelling or tenderness  Normal range of motion  Cervical back: Normal range of motion  Right lower leg: No edema  Left lower leg: No edema  Skin:     General: Skin is warm and dry  Capillary Refill: Capillary refill takes less than 2 seconds  Neurological:      Mental Status: She is alert and oriented to person, place, and time  Mental status is at baseline     Psychiatric:         Mood and Affect: Mood normal          Behavior: Behavior normal          Fetus confirmed to be in vertex presentation by transabdominal ultrasound on admission    Cervix:   3/80/-3    Fetal heart rate:    145 bpm baseline, moderate variability, 15x15 accelerations, no decelerations    [place]:    intermittent contractions    EFW: 4% via US on 1/20    GBS: negative    Prenatal Labs:   Blood Type:   Lab Results   Component Value Date/Time    ABO Grouping B 12/20/2021 12:48 AM     , D (Rh type):   Lab Results   Component Value Date/Time    Rh Factor Positive 12/20/2021 12:48 AM     , Antibody Screen: No results found for: ANTIBODYSCR , HCT/HGB:   Lab Results   Component Value Date/Time    Hematocrit 33 0 (L) 01/12/2022 10:03 AM    Hemoglobin 11 0 (L) 01/12/2022 10:03 AM      , MCV:   Lab Results   Component Value Date/Time    MCV 92 01/12/2022 10:03 AM      , Platelets:   Lab Results   Component Value Date/Time    Platelets 076 38/72/3121 10:03 AM      , 1 hour Glucola: No results found for: [id number], 3 hour GTT: No results found for: DTRIBOB5NU, Varicella: No results found for: VARICELLAIGG    , Rubella:   Lab Results   Component Value Date/Time    Rubella IgG Quant 15 7 06/22/2021 10:46 AM        , VDRL/RPR:   Lab Results   Component Value Date/Time    RPR Non-Reactive 01/12/2022 10:03 AM      , Urine Culture/Screen:   Lab Results   Component Value Date/Time    Urine Culture 10,000-19,000 cfu/ml 2021 11:47 AM       , Urine Drug Screen: No results found for: AMPHETUR, BARBTUR, BDZUR, THCUR, COCAINEUR, METHADONEUR, OPIATEUR, PCPUR, MTHAMUR, ECSTASYUR, TRICYCLICSUR, Hep B:   Lab Results   Component Value Date/Time    Hepatitis B Surface Ag Non-reactive 2021 10:46 AM    Hepatitis B Surface Ag Non-reactive 2021 10:46 AM     , Hep C: No components found for: HEPCSAG, EXTHEPCSAG   , HIV:   Lab Results   Component Value Date/Time    HIV-1/HIV-2 Ab Non-Reactive 2021 10:46 AM     , Chlamydia: No results found for: EXTCHLAMYDIA  , Gonorrhea:   Lab Results   Component Value Date/Time    N gonorrhoeae, DNA Probe Negative 2021 07:41 AM     , Group B Strep:    Lab Results   Component Value Date/Time    Strep Grp B PCR Negative 2021 05:07 AM          Invasive Devices  Report    None                   Assessment/Plan     ASSESSMENT:  27yo [id number] at 38w2d weeks gestation who is being admitted for induction of labor for IUGR with a EFW 4%  PLAN:    - Admit  - CBC, RPR, Blood Type  - Start with pitocin  - GBS negative status   - Analgesia and/or epidural at patient request  - Anticipate   - Contraception: declines, plans for b/l salpingectomy postpartum    Discussed with Dr Brigid Mc      This patient will be an INPATIENT  and I certify the anticipated length of stay is >2 Midnights      Donnie Manrique MD  2022  7:00 AM

## 2022-01-24 NOTE — OB LABOR/OXYTOCIN SAFETY PROGRESS
Oxytocin Safety Progress Check Note - Makayla Gonzalez 31 y o  female MRN: [id number]    Unit/Bed#: L&D 325-01 Encounter: [id number]    Dose (pauly-units/min) Oxytocin: 8 pauly-units/min  Contraction Frequency (minutes): 5-6  Contraction Quality: Mild  Tachysystole: No   Cervical Dilation: 3        Cervical Effacement: 80  Fetal Station: -3  Baseline Rate: 135 bpm  Fetal Heart Rate: 135 BPM  FHR Category: Category I               Vital Signs:   Vitals:    01/24/22 1025   BP: 104/58   Pulse: 86   Resp:    Temp:            Notes/comments:   Patient now comfortable with epidural in place  SVE unchanged  FHT category 1 with contractions every 2-3 min  Pit running at 8, continue to titrate to contractions  Fetal head not well applied so AROM deferred at this time  Dr Yancey Cease aware  Plan to AROM next check if fetal head well applied       Toya Harden MD 1/24/2022 10:54 AM

## 2022-01-24 NOTE — OB LABOR/OXYTOCIN SAFETY PROGRESS
Oxytocin Safety Progress Check Note - Christine Bonilla 31 y o  female MRN: [id number]    Unit/Bed#: L&D 325-01 Encounter: [id number]    Dose (pauly-units/min) Oxytocin: 20 pauly-units/min  Contraction Frequency (minutes): 2-3  Contraction Quality: Mild  Tachysystole: No   Cervical Dilation: 7        Cervical Effacement: 90  Fetal Station: -1  Baseline Rate: 145 bpm  Fetal Heart Rate: 135 BPM  FHR Category: Category I               Vital Signs:   Vitals:    01/24/22 1643   BP: 107/66   Pulse: 68   Resp:    Temp:            Notes/comments:   Recurrent late decelerations on FHT for overall category 2 tracing though moderate variability and accelerations are still present  Will decrease pitocin from 20 to 10  Dr Blayne sanchez         [place] MD Aprit 1/24/2022 5:30 PM

## 2022-01-24 NOTE — OB LABOR/OXYTOCIN SAFETY PROGRESS
Oxytocin Safety Progress Check Note - [facility] 31 y o  female MRN: [id number]    Unit/Bed#: L&D 325-01 Encounter: [id number]    Dose (pauly-units/min) Oxytocin: 20 pauly-units/min  Contraction Frequency (minutes): 2-3  Contraction Quality: Mild  Tachysystole: No   Cervical Dilation: 4        Cervical Effacement: 80  Fetal Station: -2  Baseline Rate: 145 bpm  Fetal Heart Rate: 135 BPM  FHR Category: Category I               Vital Signs:   Vitals:    01/24/22 1612   BP: 115/62   Pulse: 85   Resp:    Temp:            Notes/comments:   SVE unchanged, and forebag ruptured for large volume of clear fluid  SVE then immediately progressed to 4/80/-2  FHT category 1 prior to rupturing forebag, but category 2 with 2 variable decelerations immediately following rupturing forebag  Contractions every 2-3 min  IUPC placed to improve monitoring of contractions and timing related to FHR  Pit running at 20, continue to titrate to contractions  Dr Fei Wang aware         Hu Torres MD 1/24/2022 4:41 PM

## 2022-01-25 LAB
DME PARACHUTE DELIVERY DATE ACTUAL: NORMAL
DME PARACHUTE DELIVERY DATE REQUESTED: NORMAL
DME PARACHUTE ITEM DESCRIPTION: NORMAL
DME PARACHUTE ORDER STATUS: NORMAL
DME PARACHUTE SUPPLIER NAME: NORMAL
DME PARACHUTE SUPPLIER PHONE: NORMAL
RPR SER QL: NORMAL

## 2022-01-25 PROCEDURE — 99024 POSTOP FOLLOW-UP VISIT: CPT | Performed by: OBSTETRICS & GYNECOLOGY

## 2022-01-25 RX ORDER — DIPHENHYDRAMINE HCL 25 MG
25 TABLET ORAL EVERY 6 HOURS PRN
Status: DISCONTINUED | OUTPATIENT
Start: 2022-01-25 | End: 2022-01-26 | Stop reason: HOSPADM

## 2022-01-25 RX ADMIN — BENZOCAINE AND LEVOMENTHOL 1 APPLICATION: 200; 5 SPRAY TOPICAL at 00:32

## 2022-01-25 RX ADMIN — DOCUSATE SODIUM 100 MG: 100 CAPSULE ORAL at 18:01

## 2022-01-25 RX ADMIN — IBUPROFEN 600 MG: 600 TABLET ORAL at 09:04

## 2022-01-25 RX ADMIN — IBUPROFEN 600 MG: 600 TABLET ORAL at 02:34

## 2022-01-25 RX ADMIN — WITCH HAZEL 1 PAD: 500 SOLUTION RECTAL; TOPICAL at 00:32

## 2022-01-25 RX ADMIN — DOCUSATE SODIUM 100 MG: 100 CAPSULE ORAL at 09:04

## 2022-01-25 NOTE — PROGRESS NOTES
Pt reporting feeling unwell around 2128  Pt reported feeling dizzy, lightheaded, and blurry vision  Pulse ox applied  Vitals taken and stable  Fundus firm at U/1 with minimal bleeding  Dr Patsy Gaxiola and Dr Collins Guy notified  CBC sent  Oxygen applied  BS taken and was 66  Pt given juice and mohan crackers  RN remained at beside and vitals continued to cycle

## 2022-01-25 NOTE — QUICK NOTE
I reassessed the patient  Hgb 11 8 and platelets 691  She reports still feeling like lightheaded and having blurry vision   She is not tachycardic and still sat at % RA  She reports feeling like she has to have a bowel movement  I repeated a bimanual exam, her SULY and fundus are still firm at U-1     She is receiving a fluid bolus   Continue to monitor closely     D/w Dr Jose J Alvarenga MD  OBGYN PGY-2  10:23 PM  1/24/2022

## 2022-01-25 NOTE — QUICK NOTE
RN informed me that reports feeling dizzy and lightheaded  I went to the bedside to assess patient  She reports feeling lightheaded and dizzy  She denies chest pain, palpitations, or cramping, she has not loss consciousness   She does report feeling short of breath  At the bedside, her O2 sat is 100% on RA, and she is not tachy   BP is 114/58 which has been her baseline     On bimanual exam, the fundus is firm and minimal clots extracted from vagina  SULY is firm to palpation  Patient starting Hgb was 10 9 will repeat CBC   Glucose was 66     Patient requested to sit up in the bed and is feeling a little better  Will continue to monitor bleeding and vitals     Will reassess the patient in 15 minutes for resolution of symptoms     Dr Birdie Burdick aware     Ann Gabriel MD  OBGYN PGY-2  9:58 PM  1/24/2022

## 2022-01-25 NOTE — L&D DELIVERY NOTE
Vaginal Delivery Summary - OB/GYN   Maxwell Conner 31 y o  female MRN: [id number]  Unit/Bed#: L&D 325-01 Encounter: [id number]          Pre-delivery Diagnosis:   1  Pregnancy at 38 weeks   2  Trial of labor after    3  H/o  delivery    4  H/o C/S at 33 weeks   5  Fetal growth Restriction     Post-delivery Diagnosis: same, delivered  Uterine atony without hemorrhage   Periurethral laceration    Procedure: Vaginal Birth after , Periurethral laceration repair     Attending: Dr Elo Ram    Assistant(s): Dr Kirk Gallegos     Anesthesia: Epidural    QBL: [id number]    Complications: none apparent    Specimens:   1  Arterial and venous cord gases  2  Cord blood  3  Segment of umbilical cord  4  Placenta to pathology      Findings:  1  Viable male on 2022 at 2109, with APGARS of 8 and 9 at 1 and 5 minutes respectively,  2  Nuchal cord x1, easily reduced   3  Spontaneous delivery of intact placenta at   4  Periurethral laceration   5  Uterine atony without hemorrahge, extra pitocin and 1 dose of methergine     Gases:  Umbilical Cord Venous Blood Gas:  Results from last 7 days   Lab Units 22   PH COV  7 376   PCO2 COV mm HG 40 3   HCO3 COV mmol/L 23 1   BASE EXC COV mmol/L -1 9*   O2 CT CD VB mL/dL 12 5   O2 HGB, VENOUS CORD % 00 6     Umbilical Cord Arterial Blood Gas:  Results from last 7 days   Lab Units 22   PH COA  7 281   PCO2 COA  45 3   PO2 COA mm HG 21 7   HCO3 COA mmol/L 20 9   BASE EXC COA mmol/L -5 9*   O2 CONTENT CORD ART ml/dl 10 8   O2 HGB, ARTERIAL CORD % 49 1       Disposition:  Patient tolerated the procedure well and was recovering in labor and delivery room       Brief history and labor course:  Ms Maxwell Conner is a 31 y o  [id number]  at 38w2d who presented to L&D for induction of labor secondary to fetal growth restriction with normal dopplers   Patient has a history of 2  deliveries one was an  at 34 weeks the other at 33 weeks and a  delivery  On admission she was 3/80/-3  She was started on pitocin and received an epidural  She had spontaneous rupture of membranes  Patient progressed to complete and started to push  Description of procedure:    After pushing for 8 minutes, at  patient delivered a viable male , wt pending, apgars of 8 (1 min) and 9 (5 min)  The fetal vertex delivered spontaneously  There was a nuchal cord that was easily reduced  The right anterior shoulder delivered atraumatically with maternal expulsive forces and the assistance of gentle downward traction  The left posterior shoulder delivered with maternal expulsive forces and the assistance of gentle upward traction  The remainder of the fetus delivered spontaneously  Upon delivery, the infant was placed on the mothers abdomen and, after 30 seconds delay, the cord was doubly clamped and cut  The infant was noted to cry spontaneously and was moving all extremities appropriately  There was no evidence for injury  Awaiting nurse resuscitators evaluated the   Arterial and venous cord blood gases and cord blood was collected for analysis  These were promptly sent to the lab  In the immediate post-partum, 30 units of IV pitocin was administered, and the uterus was noted to contract down well with massage and pitocin  The placenta delivered spontaneously at  and was noted to have a centrally inserted 3 vessel cord  The vagina, cervix, perineum, and rectum were inspected and there was noted to be a periurethral laceration that was repaired with 4-0 Vicryl with a figure of eight stitch  It was hemostatic  After delivery, patient was noted to have SULY atony  Pitocin was opened wide and she received a dose of methergine  The uterus clamped appropriately with fundal and SULY massage  At the conclusion of the procedure, all needle, sponge, and instrument counts were noted to be correct   Patient tolerated the procedure well and was allowed to recover in labor and delivery room with family and  before being transferred to the post-partum floor  Dr Rosalina Wolf was present and participated in all key portions of the case        Todd Bartlett MD  OB/GYN PGY-2  2022  10:03 PM

## 2022-01-25 NOTE — CASE MANAGEMENT
Case Management Progress Note    Patient name Jordan Duvall  Location L&D 306/L&D 289-20 MRN [id number]  : 1995 Date 2022       LOS (days): 1  Geometric Mean LOS (GMLOS) (days):   Days to GMLOS:        OBJECTIVE:        Current admission status: Inpatient  Preferred Pharmacy:   [address] [address]  Phone: [phone] Fax: [phone]    Primary Care Provider: No primary care provider on file  Primary Insurance: [facility]  Secondary Insurance:     PROGRESS NOTE:    MOB requested Medela; approved  CM delivered pump to [facility]

## 2022-01-25 NOTE — PLAN OF CARE
Problem: PAIN - ADULT  Goal: Verbalizes/displays adequate comfort level or baseline comfort level  Description: Interventions:  - Encourage patient to monitor pain and request assistance  - Assess pain using appropriate pain scale  - Administer analgesics based on type and severity of pain and evaluate response  - Implement non-pharmacological measures as appropriate and evaluate response  - Consider cultural and social influences on pain and pain management  - Notify physician/advanced practitioner if interventions unsuccessful or patient reports new pain  Outcome: Progressing     Problem: INFECTION - ADULT  Goal: Absence or prevention of progression during hospitalization  Description: INTERVENTIONS:  - Assess and monitor for signs and symptoms of infection  - Monitor lab/diagnostic results  - Monitor all insertion sites, i e  indwelling lines, tubes, and drains  - Monitor endotracheal if appropriate and nasal secretions for changes in amount and color  - [place] appropriate cooling/warming therapies per order  - Administer medications as ordered  - Instruct and encourage patient and family to use good hand hygiene technique  - Identify and instruct in appropriate isolation precautions for identified infection/condition  Outcome: Progressing  Goal: Absence of fever/infection during neutropenic period  Description: INTERVENTIONS:  - Monitor WBC    Outcome: Progressing     Problem: SAFETY ADULT  Goal: Patient will remain free of falls  Description: INTERVENTIONS:  - Educate patient/family on patient safety including physical limitations  - Instruct patient to call for assistance with activity   - Consult OT/PT to assist with strengthening/mobility   - Keep Call bell within reach  - Keep bed low and locked with side rails adjusted as appropriate  - Keep care items and personal belongings within reach  - Initiate and maintain comfort rounds  - Make Fall Risk Sign visible to staff  - Offer Toileting every  Hours, in advance of need  - Initiate/Maintain alarm  - Obtain necessary fall risk management equipment:   - Apply yellow socks and bracelet for high fall risk patients  - Consider moving patient to room near nurses station  Outcome: Progressing  Goal: Maintain or return to baseline ADL function  Description: INTERVENTIONS:  -  Assess patient's ability to carry out ADLs; assess patient's baseline for ADL function and identify physical deficits which impact ability to perform ADLs (bathing, care of mouth/teeth, toileting, grooming, dressing, etc )  - Assess/evaluate cause of self-care deficits   - Assess range of motion  - Assess patient's mobility; develop plan if impaired  - Assess patient's need for assistive devices and provide as appropriate  - Encourage maximum independence but intervene and supervise when necessary  - Involve family in performance of ADLs  - Assess for home care needs following discharge   - Consider OT consult to assist with ADL evaluation and planning for discharge  - Provide patient education as appropriate  Outcome: Progressing  Goal: Maintains/Returns to pre admission functional level  Description: INTERVENTIONS:  - Perform BMAT or MOVE assessment daily    - Set and communicate daily mobility goal to care team and patient/family/caregiver  - Collaborate with rehabilitation services on mobility goals if consulted  - Perform Range of Motion  times a day  - Reposition patient every hours    - Dangle patient  times a day  - Stand patient  times a day  - Ambulate patient times a day  - Out of bed to chair  times a day   - Out of bed for meals  times a day  - Out of bed for toileting  - Record patient progress and toleration of activity level   Outcome: Progressing     Problem: Knowledge Deficit  Goal: Patient/family/caregiver demonstrates understanding of disease process, treatment plan, medications, and discharge instructions  Description: Complete learning assessment and assess knowledge base   Interventions:  - Provide teaching at level of understanding  - Provide teaching via preferred learning methods  Outcome: Progressing     Problem: DISCHARGE PLANNING  Goal: Discharge to home or other facility with appropriate resources  Description: INTERVENTIONS:  - Identify barriers to discharge w/patient and caregiver  - Arrange for needed discharge resources and transportation as appropriate  - Identify discharge learning needs (meds, wound care, etc )  - Arrange for interpretive services to assist at discharge as needed  - Refer to Case Management Department for coordinating discharge planning if the patient needs post-hospital services based on physician/advanced practitioner order or complex needs related to functional status, cognitive ability, or social support system  Outcome: Progressing

## 2022-01-25 NOTE — QUICK NOTE
I reassessed the patient who was sitting up in bed  She reports feeling better,   She has felt nauseous and vomited     Her bleeding is controlled and vitals are stable   Will continue to monitor closely

## 2022-01-25 NOTE — OB LABOR/OXYTOCIN SAFETY PROGRESS
Oxytocin Safety Progress Check Note - Stefania Rapp 31 y o  female MRN: [id number]    Unit/Bed#: L&D 325-01 Encounter: [id number]    Dose (pauly-units/min) Oxytocin: 14 pauly-units/min (oxytocin decreased by nurse on previous shift per md  12ml/min at 1845 at start of shift)  Contraction Frequency (minutes): 2-3 5  Contraction Quality: Moderate  Tachysystole: No   Cervical Dilation: 8        Cervical Effacement: 90  Fetal Station: -1  Baseline Rate: 145 bpm  Fetal Heart Rate: 135 BPM  FHR Category: Category I               Vital Signs:   Vitals:    01/24/22 1930   BP:    Pulse:    Resp:    Temp: 98 3 °F (36 8 °C)           Notes/comments: Patient feeling pressure  SVE as above  FHT cat 1          Isabela Bull MD 1/24/2022 7:50 PM

## 2022-01-25 NOTE — PROGRESS NOTES
Progress Note - OB/GYN   [facility] 31 y o  female MRN: [id number]  Unit/Bed#: L&D 306-01 Encounter: [id number]    Assessment:  31 y o  [id number] s/p Spontaneous Vaginal Delivery (successful ) Postpartum day  1; delivery at 9:09 p m  Pregnancy complicated by fetal growth restriction, history of  delivery, history of  x1 at 33 weeks; delivery complicated by uterine atony without hemorrhage  Patient recovering well, Stable    Plan:  1  Postpartum  Continue routine post partum care  Pain management PRN  Encourage ambulation  Encourage breastfeeding    2  Uterine atony without hemorrhage    S/p Methergine and Pitocin were   Hemoglobin 10 9--> 11 8    3   Discharge  Anticipate d/c tomorrow      Subjective/Objective   Chief Complaint:    Postpartum state    Subjective:   Pain: yes, cramping, improved with meds  Tolerating PO: yes  Voiding: yes  Flatus: yes  BM: no  Ambulating: yes  Breastfeeding:  yes  Chest pain: no  Shortness of breath: no  Leg pain: no  Lochia: minimal    Objective:     Vitals: Temp:  [97 3 °F (36 3 °C)-98 8 °F (37 1 °C)] 97 8 °F (36 6 °C)  HR:  [] 89  Resp:  [18] 18  BP: ()/(50-81) 99/53       Intake/Output Summary (Last 24 hours) at 2022 7029  Last data filed at 2022 0112  Gross per 24 hour   Intake 2063 17 ml   Output 2853 ml   Net -789 83 ml         Physical Exam:   General: NAD, alert, oriented  Cardio: Regular rate and rhythm, no murmur  Resp: nonlabored breathing, clear to auscultation bilaterally  Abdomen: Soft, no distension/rebound/guarding/tenderness   Fundus: Firm, non-tender, fundus:  Below umbilicus  G/U:  Minimal lochia noted on pad  Lower Extremities: Non-tender, no palpable cords    Medications:  Current Facility-Administered Medications   Medication Dose Route Frequency    acetaminophen (TYLENOL) tablet 650 mg  650 mg Oral Q4H PRN    benzocaine-menthol-lanolin-aloe (DERMOPLAST) 20-0 5 % topical spray 1 application  1 application Topical Q6H PRN    calcium carbonate (TUMS) chewable tablet 1,000 mg  1,000 mg Oral Daily PRN    diphenhydrAMINE (BENADRYL) injection 25 mg  25 mg Intravenous Q6H PRN    docusate sodium (COLACE) capsule 100 mg  100 mg Oral BID    hydrocortisone 1 % cream 1 application  1 application Topical Daily PRN    ibuprofen (MOTRIN) tablet 600 mg  600 mg Oral Q6H PRN    ondansetron (ZOFRAN) injection 4 mg  4 mg Intravenous Q8H PRN    oxytocin (PITOCIN) 30 Units in lactated ringers 500 mL infusion  250 pauly-units/min Intravenous Once    witch hazel-glycerin (TUCKS) topical pad 1 pad  1 pad Topical Q4H PRN       Labs:   Recent Results (from the past 24 hour(s))   Type and screen    Collection Time: 01/24/22  7:46 AM   Result Value Ref Range    ABO Grouping B     Rh Factor Positive     Antibody Screen Negative     Specimen Expiration Date [id number]    CBC and differential    Collection Time: 01/24/22  7:46 AM   Result Value Ref Range    WBC 12 98 (H) 4 31 - 10 16 Thousand/uL    RBC 3 51 (L) 3 81 - 5 12 Million/uL    Hemoglobin 10 9 (L) 11 5 - 15 4 g/dL    Hematocrit 32 3 (L) 34 8 - 46 1 %    MCV 92 82 - 98 fL    MCH 31 1 26 8 - 34 3 pg    MCHC 33 7 31 4 - 37 4 g/dL    RDW 13 3 11 6 - 15 1 %    MPV 11 8 8 9 - 12 7 fL    Platelets 925 054 - 542 Thousands/uL    nRBC 0 /100 WBCs    Neutrophils Relative 74 43 - 75 %    Immat GRANS % 1 0 - 2 %    Lymphocytes Relative 15 14 - 44 %    Monocytes Relative 9 4 - 12 %    Eosinophils Relative 1 0 - 6 %    Basophils Relative 0 0 - 1 %    Neutrophils Absolute 9 50 (H) 1 85 - 7 62 Thousands/µL    Immature Grans Absolute 0 15 0 00 - 0 20 Thousand/uL    Lymphocytes Absolute 2 00 0 60 - 4 47 Thousands/µL    Monocytes Absolute 1 22 0 17 - 1 22 Thousand/µL    Eosinophils Absolute 0 08 0 00 - 0 61 Thousand/µL    Basophils Absolute 0 03 0 00 - 0 10 Thousands/µL   Blood gas, venous, cord    Collection Time: 01/24/22  9:10 PM   Result Value Ref Range    pH, Cord Leo 7 376 7 190 - 7 490    pCO2, Cord Leo 40 3 27 0 - 43 0 mm HG    pO2, Cord Leo 22 4 15 0 - 45 0 mm HG    HCO3, Cord Leo 23 1 12 2 - 28 6 mmol/L    Base Exc, Cord Leo -1 9 (L) 1 0 - 9 0 mmol/L    O2 Cont, Cord Leo 12 5 mL/dL    O2 HGB,VENOUS CORD 59 6 %   Blood gas, arterial, cord    Collection Time: 01/24/22  9:10 PM   Result Value Ref Range    pH, Cord Art 7 281 7 230 - 7 430    pCO2, Cord Art 45 3 30 0 - 60 0    pO2, Cord Art 21 7 5 0 - 25 0 mm HG    HCO3, Cord Art 20 9 17 3 - 27 3 mmol/L    Base Exc, Cord Art -5 9 (L) 3 0 - 11 0 mmol/L    O2 Content, Cord Art 10 8 ml/dl    O2 Hgb, Arterial Cord 49 1 %   CBC and differential    Collection Time: 01/24/22  9:51 PM   Result Value Ref Range    WBC 17 23 (H) 4 31 - 10 16 Thousand/uL    RBC 3 80 (L) 3 81 - 5 12 Million/uL    Hemoglobin 11 8 11 5 - 15 4 g/dL    Hematocrit 35 7 34 8 - 46 1 %    MCV 94 82 - 98 fL    MCH 31 1 26 8 - 34 3 pg    MCHC 33 1 31 4 - 37 4 g/dL    RDW 13 3 11 6 - 15 1 %    MPV 12 4 8 9 - 12 7 fL    Platelets 244 210 - 451 Thousands/uL    nRBC 0 /100 WBCs    Neutrophils Relative 78 (H) 43 - 75 %    Immat GRANS % 1 0 - 2 %    Lymphocytes Relative 13 (L) 14 - 44 %    Monocytes Relative 8 4 - 12 %    Eosinophils Relative 0 0 - 6 %    Basophils Relative 0 0 - 1 %    Neutrophils Absolute 13 43 (H) 1 85 - 7 62 Thousands/µL    Immature Grans Absolute 0 14 0 00 - 0 20 Thousand/uL    Lymphocytes Absolute 2 17 0 60 - 4 47 Thousands/µL    Monocytes Absolute 1 41 (H) 0 17 - 1 22 Thousand/µL    Eosinophils Absolute 0 03 0 00 - 0 61 Thousand/µL    Basophils Absolute 0 05 0 00 - 0 10 Thousands/µL   Fingerstick Glucose (POCT)    Collection Time: 01/24/22  9:52 PM   Result Value Ref Range    POC Glucose 66 65 - 140 mg/dl         Edd Zuluaga  Ob/Gyn PGY-2  1/25/2022  6:58 AM

## 2022-01-25 NOTE — PLAN OF CARE
Problem: Knowledge Deficit  Goal: Verbalizes understanding of labor plan  Description: Assess patient/family/caregiver's baseline knowledge level and ability to understand information  Provide education via patient/family/caregiver's preferred learning method at appropriate level of understanding  1  Provide teaching at level of understanding  2  Provide teaching via preferred learning method(s)  1/24/2022 2324 by You Burt RN  Outcome: Completed  1/24/2022 1910 by You Burt RN  Outcome: Progressing     Problem: Labor & Delivery  Goal: Manages discomfort  Description: Assess and monitor for signs and symptoms of discomfort  Assess patient's pain level regularly and per hospital policy  Administer medications as ordered  Support use of nonpharmacological methods to help control pain such as distraction, imagery, relaxation, and application of heat and cold  Collaborate with interdisciplinary team and patient to determine appropriate pain management plan  1  Include patient in decisions related to comfort  2  Offer non-pharmacological pain management interventions  3  Report ineffective pain management to physician   1/24/2022 2324 by You Burt RN  Outcome: Completed  1/24/2022 1910 by You Burt RN  Outcome: Progressing  Goal: Patient vital signs are stable  Description: 1  Assess vital signs - vaginal delivery    1/24/2022 2324 by You Burt RN  Outcome: Completed  1/24/2022 1910 by You Burt RN  Outcome: Progressing

## 2022-01-25 NOTE — LACTATION NOTE
This note was copied from a baby's chart  CONSULT - LACTATION  Baby Boy Walt Huber 1 days male MRN: [id number]    [address] [facility] NURSERY Room / Bed: L&D 306(N)/L&D 306(N) Encounter: [id number]    Maternal Information     MOTHER:  Crissy Saha  Maternal Age: 31 y o    OB History: # 1 - Date: , Sex: None, Weight: None, GA: 34w5d, Delivery: Vaginal, Spontaneous, Apgar1: None, Apgar5: None, Living: Living, Birth Comments: None    # 2 - Date: , Sex: None, Weight: None, GA: None, Delivery: None, Apgar1: None, Apgar5: None, Living: None, Birth Comments: None    # 3 - Date: , Sex: None, Weight: None, GA: 33w1d, Delivery: , Low Transverse, Apgar1: None, Apgar5: None, Living: Living, Birth Comments: Fetal bradycardia per [facility] chart (pre noted "Had anxiety attack affecting fetal status, had   ")    # 4 - Date: 2018, Sex: None, Weight: None, GA: None, Delivery: None, Apgar1: None, Apgar5: None, Living: None, Birth Comments: None    # 5 - Date: , Sex: None, Weight: None, GA: None, Delivery: Spontaneous , Apgar1: None, Apgar5: None, Living: None, Birth Comments: None    # 6 - Date: , Sex: None, Weight: None, GA: None, Delivery: Spontaneous , Apgar1: None, Apgar5: None, Living: None, Birth Comments: None    # 7 - Date: 22, Sex: Male, Weight: 2545 g (5 lb 9 8 oz), GA: 38w2d, Delivery: Vaginal, Spontaneous, Apgar1: 8, Apgar5: 9, Living: Living, Birth Comments: None   Previouse breast reduction surgery? No    Lactation history:   Has patient previously breast fed: Yes   How long had patient previously breast fed: 3-4 months   Previous breast feeding complications: Infant separation,Exclusive pump and bottle fed,Other (Comment) (baby's in NICU)     Past Surgical History:   Procedure Laterality Date     SECTION      Patient states anxiety attack which affected fetal tracing necessitating       DILATION AND EVACUATION  2016    Elective termination    DILATION AND EVACUATION  2018    Persistent Missed pregnancy    [place] SURG RX MISSED ABORTN,1ST TRI N/A 2020    Procedure: DILATATION AND EVACUATION (D&E) ( 8 OF WEEKS); Surgeon: Cinthya Atwood MD;  Location: [facility];  Service: Gynecology    WISDOM TOOTH EXTRACTION          Birth information:  YOB: 2023   Time of birth: 9:09 PM   Sex: male   Delivery type: Vaginal, Spontaneous   Birth Weight: 2545 g (5 lb 9 8 oz)   Percent of Weight Change: 0%     Gestational Age: 36w4d   [unfilled]    Assessment     Breast and nipple assessment: normal assessment     Assessment: normal assessment    Feeding assessment: feeding well  LATCH:  Latch: Grasps breast, tongue down, lips flanged, rhythmic sucking   Audible Swallowing: A few with stimulation   Type of Nipple: Everted (After stimulation)   Comfort (Breast/Nipple): Soft/non-tender   Hold (Positioning): Partial assist, teach one side, mother does other, staff holds   LATCH Score: 8          Feeding recommendations:  breast feed on demand         Met with mother  Provided with Ready, Set, Baby booklet  Discussed Skin to Skin contact an benefits to mom and baby  Talked about the delay of the first bath until baby has adjusted  Spoke about the benefits of rooming in  Feeding on cue and what that means for recognizing infant's hunger  Avoidance of pacifiers for the first month discussed  Talked about exclusive breastfeeding for the first 6 months  Positioning and latch reviewed as well as showing images of other feeding positions  Discussed the properties of a good latch in any position  Baby latched deeply at this time, denies pain  Reviewed hand/manual expression  Discussed s/s that baby is getting enough milk and some s/s that breastfeeding dyad may need further help  Dad at bedside      Gave information on common concerns, what to expect the first few weeks after delivery, preparing for other caregivers, and how partners can help  Resources for support also provided  Also reviewed discharge breastfeeding booklet including the feeding log  Emphasized 8 or more (12) feedings in a 24 hour period, what to expect for the number of diapers per day of life and the progression of properties of the  stooling pattern  Reviewed breastfeeding and your lifestyle, storage and preparation of breast milk, how to keep you breast pump clean, the employed breastfeeding mother and paced bottle feeding handouts  Booklet included Breastfeeding Resources for after discharge including access to the number for the [address]  Mother verbalized breastfeeding is going well  Enc to call for assistance as needed,phone # given                    David Maloney RN 2022 12:07 PM

## 2022-01-25 NOTE — PLAN OF CARE
Problem: PAIN - ADULT  Goal: Verbalizes/displays adequate comfort level or baseline comfort level  Description: Interventions:  - Encourage patient to monitor pain and request assistance  - Assess pain using appropriate pain scale  - Administer analgesics based on type and severity of pain and evaluate response  - Implement non-pharmacological measures as appropriate and evaluate response  - Consider cultural and social influences on pain and pain management  - Notify physician/advanced practitioner if interventions unsuccessful or patient reports new pain  Outcome: Progressing     Problem: INFECTION - ADULT  Goal: Absence or prevention of progression during hospitalization  Description: INTERVENTIONS:  - Assess and monitor for signs and symptoms of infection  - Monitor lab/diagnostic results  - Monitor all insertion sites, i e  indwelling lines, tubes, and drains  - Monitor endotracheal if appropriate and nasal secretions for changes in amount and color  - [place] appropriate cooling/warming therapies per order  - Administer medications as ordered  - Instruct and encourage patient and family to use good hand hygiene technique  - Identify and instruct in appropriate isolation precautions for identified infection/condition  Outcome: Progressing  Goal: Absence of fever/infection during neutropenic period  Description: INTERVENTIONS:  - Monitor WBC    Outcome: Progressing     Problem: SAFETY ADULT  Goal: Patient will remain free of falls  Description: INTERVENTIONS:  - Educate patient/family on patient safety including physical limitations  - Instruct patient to call for assistance with activity   - Consult OT/PT to assist with strengthening/mobility   - Keep Call bell within reach  - Keep bed low and locked with side rails adjusted as appropriate  - Keep care items and personal belongings within reach  - Initiate and maintain comfort rounds  - Make Fall Risk Sign visible to staff  - Offer Toileting every  Hours, in advance of need  - Initiate/Maintain alarm  - Obtain necessary fall risk management equipment:   - Apply yellow socks and bracelet for high fall risk patients  - Consider moving patient to room near nurses station  Outcome: Progressing  Goal: Maintain or return to baseline ADL function  Description: INTERVENTIONS:  -  Assess patient's ability to carry out ADLs; assess patient's baseline for ADL function and identify physical deficits which impact ability to perform ADLs (bathing, care of mouth/teeth, toileting, grooming, dressing, etc )  - Assess/evaluate cause of self-care deficits   - Assess range of motion  - Assess patient's mobility; develop plan if impaired  - Assess patient's need for assistive devices and provide as appropriate  - Encourage maximum independence but intervene and supervise when necessary  - Involve family in performance of ADLs  - Assess for home care needs following discharge   - Consider OT consult to assist with ADL evaluation and planning for discharge  - Provide patient education as appropriate  Outcome: Progressing  Goal: Maintains/Returns to pre admission functional level  Description: INTERVENTIONS:  - Perform BMAT or MOVE assessment daily    - Set and communicate daily mobility goal to care team and patient/family/caregiver  - Collaborate with rehabilitation services on mobility goals if consulted  - Perform Range of Motion  times a day  - Reposition patient every  hours    - Dangle patient  times a day  - Stand patient  times a day  - Ambulate patient  times a day  - Out of bed to chair  times a day   - Out of bed for meals times a day  - Out of bed for toileting  - Record patient progress and toleration of activity level   Outcome: Progressing     Problem: Knowledge Deficit  Goal: Patient/family/caregiver demonstrates understanding of disease process, treatment plan, medications, and discharge instructions  Description: Complete learning assessment and assess knowledge base   Interventions:  - Provide teaching at level of understanding  - Provide teaching via preferred learning methods  Outcome: Progressing     Problem: DISCHARGE PLANNING  Goal: Discharge to home or other facility with appropriate resources  Description: INTERVENTIONS:  - Identify barriers to discharge w/patient and caregiver  - Arrange for needed discharge resources and transportation as appropriate  - Identify discharge learning needs (meds, wound care, etc )  - Arrange for interpretive services to assist at discharge as needed  - Refer to Case Management Department for coordinating discharge planning if the patient needs post-hospital services based on physician/advanced practitioner order or complex needs related to functional status, cognitive ability, or social support system  Outcome: Progressing

## 2022-01-25 NOTE — DISCHARGE SUMMARY
Discharge Summary - OB/GYN   [facility] MRN: [id number]  Unit/Bed#: L&D 325-01 Encounter: [id number]      Admission Date: 2022     Discharge Date: 22    Admitting Diagnosis:   1  Pregnancy at 38 weeks   2  Trial of labor after    3  H/o  delivery    4  H/o C/S at 33 weeks   5  Fetal growth Restriction     Discharge Diagnosis:   Same, delivered  Uterine Atony without hemorrhage   Periurethral laceration     Procedures: vaginal birth after  () and periurethral laceration repair    Delivery Attending: Khushi Beard DO  Discharge Attending: Dr Pinto  Course:     Derek Rangel is a 31 y o  [id number] at 38w2d wks who was initially admitted for IOL secondary to fetal growth restriction with normal dopplers  Patient's pregnancy was complicated by the above  She was started on pitocin and received an epidural  She had spontaneous rupture of membranes and progressed to complete and started to push  She delivered a viable male  on  at 2109  Weight 5 lbs 9 8 oz via vaginal birth after  ()  Apgars were 8 (1 min) and 9 (5 min)   was transferred to  nursery  Patient tolerated the procedure well and was transferred to recovery in stable condition  Her postpartum course was uncomplicated    Her postpartum pain was well controlled with oral analgesics  She declined contraceptive bridge to future tubal ligation  On day of discharge, she was ambulating and able to reasonably perform all ADLs  She was voiding and had appropriate bowel function  Pain was well controlled  She was discharged home on post-partum day #2 without complications  Patient was instructed to follow up with her OB as an outpatient and was given appropriate warnings to call provider if she develops signs of infection or uncontrolled pain      Complications: none apparent    Condition at discharge: good     Discharge instructions/Information to patient and family:   - Do not place anything (no partner, tampons or douche) in your vagina for 6 weeks  -You may walk for exercise for the first 6 weeks then gradually return to your usual activities    -Please do not drive for 1 week if you have no stitches and for 2 weeks if you have stitches or underwent a  delivery     -You may take baths or shower per your preference    -Please look at your bust (breasts) in the mirror daily and call for redness or tenderness or increased warmth    -Please call us for temperature > 100 4*F or 38* C, worsening pain or a foul discharge        Discharge Medications:   Please see AVS for full list of medications upon discharge    Planned Readmission: Katherin Landon MD  22  6:17 AM

## 2022-01-25 NOTE — UTILIZATION REVIEW
Inpatient Admission Authorization Request   Notification of Maternity/Delivery &  Birth Information for Admission   SERVICING FACILITY:   [address] [place] [address]  Tax ID: [id number]  NPI: [id number]  Place of Service: Inpatient [address] [facility]  [address]  Place of Service Code: 24     ATTENDING PROVIDER:  Attending Name and NPI#: Michelle Rowe [[id number]]  Address: [address] [place] [address]  Phone: [phone]     UTILIZATION REVIEW CONTACT:  Arlene Domínguez Utilization   Network Utilization Review Department  Phone: [phone]  Fax [phone]  Email: [email or web address]     PHYSICIAN ADVISORY SERVICES:  FOR XSRJ-NL-ITCI REVIEW - MEDICAL NECESSITY DENIAL  Phone: [phone]  Fax: [phone]  Email: [email or web address]     TYPE OF REQUEST:  Inpatient Status     ADMISSION INFORMATION:  ADMISSION DATE/TIME: 22  6:59 AM  PATIENT DIAGNOSIS CODE/DESCRIPTION:  Encounter for full-term uncomplicated delivery [Z98] The encounter diagnosis was 38 weeks gestation of pregnancy  1  38 weeks gestation of pregnancy      DISCHARGE DATE/TIME: No discharge date for patient encounter  DISCHARGE DISPOSITION (IF DISCHARGED): Home/Self Care     MOTHER AND  INFORMATION:  Mother: Sheree Kirk 1994   Delivering clinician: Rishabh Ahn   OB History        7    Para   3    Term   1       2    AB   4    Living   3       SAB   2    IAB   2    Ectopic        Multiple   0    Live Births   3              Name & MRN:   Information for the patient's :  Parvin Pack [[id number]]     [place] Delivery Information:  Sex: male  Delivered 2022 9:09 PM by Vaginal, Spontaneous; Gestational Age: 36w4d     Measurements:  Weight: 5 lb 9 8 oz (2545 g);   Height: 18"    APGAR 1 minute 5 minutes 10 minutes   Totals: 8 9     Birth Information: 31 y o  female MRN: [id number] Unit/Bed#: L&D 306-01 Estimated Date of Delivery: 2/5/22  Birthweight: No birth weight on file  Gestational Age: <None> Delivery Type: Vaginal, Spontaneous      IMPORTANT INFORMATION:  Please contact the [facility] directly with any questions or concerns regarding this request  Department voicemails are confidential     Send requests for admission clinical reviews, concurrent reviews, approvals, and administrative denials due to lack of clinical to fax [phone]

## 2022-01-25 NOTE — OB LABOR/OXYTOCIN SAFETY PROGRESS
Oxytocin Safety Progress Check Note - Magdalene Acevedo 31 y o  female MRN: [id number]    Unit/Bed#: L&D 325-01 Encounter: [id number]    Dose (pauly-units/min) Oxytocin: 16 pauly-units/min  Contraction Frequency (minutes): 2-3 5  Contraction Quality: Strong  Tachysystole: No   Cervical Dilation: 10  Dilation Complete Date: 01/24/22  Dilation Complete Time: 2042  Cervical Effacement: 100  Fetal Station: 1  Baseline Rate: 135 bpm  Fetal Heart Rate: 135 BPM  FHR Category: Category I               Vital Signs:   Vitals:    01/24/22 2000   BP: 114/58   Pulse:    Resp:    Temp:            Notes/comments: Patietn complete will start pushing          Marc Banks MD 1/24/2022 8:59 PM

## 2022-01-26 VITALS
OXYGEN SATURATION: 100 % | WEIGHT: 129.2 LBS | DIASTOLIC BLOOD PRESSURE: 58 MMHG | HEART RATE: 72 BPM | HEIGHT: 60 IN | SYSTOLIC BLOOD PRESSURE: 96 MMHG | BODY MASS INDEX: 25.36 KG/M2 | TEMPERATURE: 97.8 F | RESPIRATION RATE: 18 BRPM

## 2022-01-26 PROBLEM — Z3A.38 38 WEEKS GESTATION OF PREGNANCY: Status: RESOLVED | Noted: 2021-07-22 | Resolved: 2022-01-26

## 2022-01-26 PROBLEM — O36.5930 INTRAUTERINE GROWTH RESTRICTION AFFECTING ANTEPARTUM CARE OF MOTHER IN THIRD TRIMESTER: Status: RESOLVED | Noted: 2022-01-06 | Resolved: 2022-01-26

## 2022-01-26 PROBLEM — O09.899 HISTORY OF PRETERM DELIVERY, CURRENTLY PREGNANT: Status: RESOLVED | Noted: 2021-06-08 | Resolved: 2022-01-26

## 2022-01-26 PROBLEM — Z86.2 HISTORY OF ANEMIA: Status: RESOLVED | Noted: 2020-12-30 | Resolved: 2022-01-26

## 2022-01-26 PROBLEM — O34.219 VBAC, DELIVERED: Status: ACTIVE | Noted: 2022-01-24

## 2022-01-26 PROCEDURE — 99024 POSTOP FOLLOW-UP VISIT: CPT | Performed by: OBSTETRICS & GYNECOLOGY

## 2022-01-26 RX ORDER — IBUPROFEN 200 MG
600 TABLET ORAL EVERY 6 HOURS PRN
Start: 2022-01-26

## 2022-01-26 RX ORDER — ACETAMINOPHEN 325 MG/1
650 TABLET ORAL EVERY 6 HOURS PRN
Refills: 0 | Status: CANCELLED
Start: 2022-01-26

## 2022-01-26 RX ORDER — ACETAMINOPHEN 325 MG/1
650 TABLET ORAL EVERY 4 HOURS PRN
Refills: 0
Start: 2022-01-26

## 2022-01-26 RX ADMIN — DOCUSATE SODIUM 100 MG: 100 CAPSULE ORAL at 09:33

## 2022-01-26 NOTE — NURSING NOTE
Discharge teaching completed with patient  Informational handouts given to patient, along with Safe Your Life magnet  Patient verbalized understanding of material and felt comfortable asking questions  Will continue to assess further education needs prior to discharge

## 2022-01-26 NOTE — PROGRESS NOTES
Progress Note - OB/GYN  Keyonna Trinidad 31 y o  female MRN: [id number]  Unit/Bed#: L&D 306-01 Encounter: [id number]    ASSESSMENT:  Postpartum day #2 s/p , stable, baby in room  Blood pressures: 90s-120s/50s-70s    PLAN:  #1  S/p   - Complicated by uterine atony without hemorrhage,  mL, s/p methergine and pitocin, Hgb 10 3 --> 11 8  - Pain well controlled with oral analgesics  - Voiding spontaneously  - Tolerating PO fluids and solids  - Ambulating without difficulty    #2  Contraception  - Declines on DC, plans for barrier method bridge to tubal    #3  Continue routine post partum care  - Encourage ambulation  - Encourage breastfeeding  - Anticipate discharge PPD2 today      SUBJECTIVE:  Post delivery  Patient is doing well  Lochia WNL  Pain well controlled      Pain: yes, cramping, improved with meds  Tolerating PO: yes  Voiding: yes  Flatus: yes  BM: no  Ambulating: yes  Breastfeeding: yes  Chest pain: no  Shortness of breath: no  Leg pain: no  Lochia: WNL      OBJECTIVE:    Vitals:  Vitals:    22 0700 22 1500 22 1900 22 2230   BP: 94/54 100/57 93/55 93/52   BP Location: Right arm Right arm Right arm Right arm   Pulse: 85 85 87 76   Resp: 18 18 18 18   Temp: 98 7 °F (37 1 °C) 98 7 °F (37 1 °C) 98 3 °F (36 8 °C) 98 2 °F (36 8 °C)   TempSrc: Oral Oral Temporal Temporal   SpO2:       Weight:       Height:           No intake or output data in the 24 hours ending 22 0551    Lab Results   Component Value Date    WBC 17 23 (H) 2022    HGB 11 8 2022    HCT 35 7 2022    MCV 94 2022     2022       Physical Exam:  Gen: NAD  CV: warm and well perfused  Lungs: non-labored breathing  Abd: soft, non-tender, non-distended, no rebound or guarding  Uterine fundus: firm and non-tender, 2 cm below the umbilicus  Ext: non-tender      Dia Richey MD  OB/GYN PGY-1  2022  5:51 AM

## 2022-01-26 NOTE — PLAN OF CARE
Problem: PAIN - ADULT  Goal: Verbalizes/displays adequate comfort level or baseline comfort level  Description: Interventions:  - Encourage patient to monitor pain and request assistance  - Assess pain using appropriate pain scale  - Administer analgesics based on type and severity of pain and evaluate response  - Implement non-pharmacological measures as appropriate and evaluate response  - Consider cultural and social influences on pain and pain management  - Notify physician/advanced practitioner if interventions unsuccessful or patient reports new pain  Outcome: Progressing     Problem: INFECTION - ADULT  Goal: Absence or prevention of progression during hospitalization  Description: INTERVENTIONS:  - Assess and monitor for signs and symptoms of infection  - Monitor lab/diagnostic results  - Monitor all insertion sites, i e  indwelling lines, tubes, and drains  - Monitor endotracheal if appropriate and nasal secretions for changes in amount and color  - [place] appropriate cooling/warming therapies per order  - Administer medications as ordered  - Instruct and encourage patient and family to use good hand hygiene technique  - Identify and instruct in appropriate isolation precautions for identified infection/condition  Outcome: Progressing  Goal: Absence of fever/infection during neutropenic period  Description: INTERVENTIONS:  - Monitor WBC    Outcome: Progressing     Problem: SAFETY ADULT  Goal: Patient will remain free of falls  Description: INTERVENTIONS:  - Educate patient/family on patient safety including physical limitations  - Instruct patient to call for assistance with activity   - Consult OT/PT to assist with strengthening/mobility   - Keep Call bell within reach  - Keep bed low and locked with side rails adjusted as appropriate  - Keep care items and personal belongings within reach  - Initiate and maintain comfort rounds  - Make Fall Risk Sign visible to staff  - Apply yellow socks and bracelet for high fall risk patients  - Consider moving patient to room near nurses station  Outcome: Progressing  Goal: Maintain or return to baseline ADL function  Description: INTERVENTIONS:  -  Assess patient's ability to carry out ADLs; assess patient's baseline for ADL function and identify physical deficits which impact ability to perform ADLs (bathing, care of mouth/teeth, toileting, grooming, dressing, etc )  - Assess/evaluate cause of self-care deficits   - Assess range of motion  - Assess patient's mobility; develop plan if impaired  - Assess patient's need for assistive devices and provide as appropriate  - Encourage maximum independence but intervene and supervise when necessary  - Involve family in performance of ADLs  - Assess for home care needs following discharge   - Consider OT consult to assist with ADL evaluation and planning for discharge  - Provide patient education as appropriate  Outcome: Progressing  Goal: Maintains/Returns to pre admission functional level  Description: INTERVENTIONS:  - Perform BMAT or MOVE assessment daily    - Set and communicate daily mobility goal to care team and patient/family/caregiver  - Collaborate with rehabilitation services on mobility goals if consulted  - Out of bed for toileting  - Record patient progress and toleration of activity level   Outcome: Progressing     Problem: Knowledge Deficit  Goal: Patient/family/caregiver demonstrates understanding of disease process, treatment plan, medications, and discharge instructions  Description: Complete learning assessment and assess knowledge base    Interventions:  - Provide teaching at level of understanding  - Provide teaching via preferred learning methods  Outcome: Progressing     Problem: DISCHARGE PLANNING  Goal: Discharge to home or other facility with appropriate resources  Description: INTERVENTIONS:  - Identify barriers to discharge w/patient and caregiver  - Arrange for needed discharge resources and transportation as appropriate  - Identify discharge learning needs (meds, wound care, etc )  - Arrange for interpretive services to assist at discharge as needed  - Refer to Case Management Department for coordinating discharge planning if the patient needs post-hospital services based on physician/advanced practitioner order or complex needs related to functional status, cognitive ability, or social support system  Outcome: Progressing

## 2022-01-26 NOTE — LACTATION NOTE
This note was copied from a baby's chart  Ana Caldera was to call for review D/C booklet that was given and for latch assessment/support when Justin demonstrated hunger cues

## 2022-01-26 NOTE — PROGRESS NOTES
All belongings accounted for by patient and S O  before leaving  Discharge instructions given and reviewed, questions answered  Mother chose to walk from unit escorted by S O  carrying infant secured in car seat and Katy Mcnally

## 2022-01-26 NOTE — PLAN OF CARE
Problem: PAIN - ADULT  Goal: Verbalizes/displays adequate comfort level or baseline comfort level  Description: Interventions:  - Encourage patient to monitor pain and request assistance  - Assess pain using appropriate pain scale  - Administer analgesics based on type and severity of pain and evaluate response  - Implement non-pharmacological measures as appropriate and evaluate response  - Consider cultural and social influences on pain and pain management  - Notify physician/advanced practitioner if interventions unsuccessful or patient reports new pain  Outcome: Progressing     Problem: INFECTION - ADULT  Goal: Absence or prevention of progression during hospitalization  Description: INTERVENTIONS:  - Assess and monitor for signs and symptoms of infection  - Monitor lab/diagnostic results  - Monitor all insertion sites, i e  indwelling lines, tubes, and drains  - Monitor endotracheal if appropriate and nasal secretions for changes in amount and color  - [place] appropriate cooling/warming therapies per order  - Administer medications as ordered  - Instruct and encourage patient and family to use good hand hygiene technique  - Identify and instruct in appropriate isolation precautions for identified infection/condition  Outcome: Progressing  Goal: Absence of fever/infection during neutropenic period  Description: INTERVENTIONS:  - Monitor WBC    Outcome: Progressing     Problem: SAFETY ADULT  Goal: Patient will remain free of falls  Description: INTERVENTIONS:  - Educate patient/family on patient safety including physical limitations  - Instruct patient to call for assistance with activity   - Consult OT/PT to assist with strengthening/mobility   - Keep Call bell within reach  - Keep bed low and locked with side rails adjusted as appropriate  - Keep care items and personal belongings within reach  - Initiate and maintain comfort rounds  Outcome: Progressing  Goal: Maintain or return to baseline ADL function  Description: INTERVENTIONS:  -  Assess patient's ability to carry out ADLs; assess patient's baseline for ADL function and identify physical deficits which impact ability to perform ADLs (bathing, care of mouth/teeth, toileting, grooming, dressing, etc )  - Assess/evaluate cause of self-care deficits   - Assess range of motion  - Assess patient's mobility; develop plan if impaired  - Assess patient's need for assistive devices and provide as appropriate  - Encourage maximum independence but intervene and supervise when necessary  - Involve family in performance of ADLs  - Assess for home care needs following discharge   - Consider OT consult to assist with ADL evaluation and planning for discharge  - Provide patient education as appropriate  Outcome: Progressing  Goal: Maintains/Returns to pre admission functional level  Description: INTERVENTIONS:  - Perform BMAT or MOVE assessment daily    - Set and communicate daily mobility goal to care team and patient/family/caregiver  - Collaborate with rehabilitation services on mobility goals if consulted  - Ambulate patient 3 times a day  - Out of bed for meals 3 times a day  - Out of bed for toileting  - Record patient progress and toleration of activity level   Outcome: Progressing     Problem: Knowledge Deficit  Goal: Patient/family/caregiver demonstrates understanding of disease process, treatment plan, medications, and discharge instructions  Description: Complete learning assessment and assess knowledge base    Interventions:  - Provide teaching at level of understanding  - Provide teaching via preferred learning methods  Outcome: Progressing     Problem: DISCHARGE PLANNING  Goal: Discharge to home or other facility with appropriate resources  Description: INTERVENTIONS:  - Identify barriers to discharge w/patient and caregiver  - Arrange for needed discharge resources and transportation as appropriate  - Identify discharge learning needs (meds, wound care, etc )  - Arrange for interpretive services to assist at discharge as needed  - Refer to Case Management Department for coordinating discharge planning if the patient needs post-hospital services based on physician/advanced practitioner order or complex needs related to functional status, cognitive ability, or social support system  Outcome: Progressing     Problem: POSTPARTUM  Goal: Experiences normal postpartum course  Description: INTERVENTIONS:  - Monitor maternal vital signs  - Assess uterine involution and lochia  Outcome: Progressing  Goal: Appropriate maternal -  bonding  Description: INTERVENTIONS:  - Identify family support  - Assess for appropriate maternal/infant bonding   -Encourage maternal/infant bonding opportunities  - Referral to  or  as needed  Outcome: Progressing  Goal: Establishment of infant feeding pattern  Description: INTERVENTIONS:  - Assess breast/bottle feeding  - Refer to lactation as needed  Outcome: Progressing  Goal: Incision(s), wounds(s) or drain site(s) healing without S/S of infection  Description: INTERVENTIONS  - Assess and document dressing, incision, wound bed, drain sites and surrounding tissue  - Provide patient and family education  - Perform skin care/dressing changes every day  Outcome: Progressing

## 2022-01-26 NOTE — DISCHARGE INSTRUCTIONS
Vaginal Delivery   AMBULATORY CARE:   What you need to know about vaginal delivery:  A vaginal delivery occurs when your baby is born through your vagina (birth canal)  How to prepare for vaginal delivery:   · You can ask someone to be with you during labor and delivery  The person can be a spouse, friend, or family member  This person can help make you more comfortable  Arrange to be able to contact the person when labor begins  · You may need tests to check for certain infections that can be passed to your baby  You may be given antibiotics to fight a bacterial infection you have or prevent an infection during delivery  · Ask if it is okay to eat while you are in labor  · Your healthcare provider can give you medicines for pain relief if you choose to have them  You may need medicine to induce (start) your labor if your labor is not moving forward  You may need to move in bed, stand, or walk to help your baby move into position for birth  What will happen during vaginal delivery:   · You can move into several positions during delivery  You can lie on your back, have your feet up in stirrups, or squat  You may feel pressure on your rectum  This pressure is caused by the movement of your baby's head down the birth canal  You may feel the urge to push  Your healthcare provider will have you push when you feel the urge  He or she will guide your baby out of the birth canal  Forceps or suction may be used to help deliver your baby  You may also need an episiotomy (incision) to make the vaginal opening larger  This will make more room for your baby  · At least 1 minute after your baby is born, your healthcare provider will put clamps on the umbilical cord  The cord will then be cut  Your uterus will continue to contract after delivery to push out the placenta  You may be given medicine to prevent heavy bleeding when the placenta is pushed out   Your healthcare provider may close your episiotomy incision or any tears with stitches  What will happen after vaginal delivery:   · Healthcare providers will examine your baby  Your baby may be placed on your chest right away  He or she may also start breastfeeding  Your baby will also be given vitamin K as a shot  · Healthcare providers will examine you  Your blood pressure will be checked right after you give birth  Providers will check for vaginal bleeding, and check that your uterus is maxim  Your temperature and heart rate will be checked regularly  · You may be taken to another room to rest with your baby  Call for a healthcare provider if you are holding your baby and start to feel tired  The provider can put him or her in a bassinet near you while you rest or sleep  This will help prevent an accidental drop or fall of your baby  · A healthcare provider may massage your abdomen several times to make your uterus firm  This can be uncomfortable  You may have abdominal pains for up to 3 days after you give birth because your uterus is still maxim  The contractions help release blood from inside your uterus so it shrinks back to its normal size  These contractions may hurt more while you breastfeed your baby  · Your healthcare provider may suggest you get out of bed to sit in a chair or walk  Activity can help prevent blood clots  · You may be able to go home within 24 to 48 hours after delivery  If you need support at home, ask your healthcare provider about home visits by another healthcare provider  This healthcare provider can help you learn about breastfeeding, bottle feeding, baby care, and perineum care  Call your doctor or obstetrician if:   · Your leg feels warm, tender, and painful  It may look swollen and red  · You have a fever  · You are urinating very little, or not at all  · You have heavy vaginal bleeding that fills 1 or more sanitary pads in 1 hour  · You feel weak, dizzy, or faint       · Your abdominal or perineal pain does not go away, or gets worse  · You feel depressed  · You have questions or concerns about your condition or care  Medicines:   · NSAIDs , such as ibuprofen, help decrease swelling, pain, and fever  This medicine is available with or without a doctor's order  NSAIDs can cause stomach bleeding or kidney problems in certain people  If you take blood thinner medicine, always ask your healthcare provider if NSAIDs are safe for you  Always read the medicine label and follow directions  · Stool softeners  make it easier for you to have a bowel movement  You may need this medicine to treat or prevent constipation  · Take your medicine as directed  Contact your healthcare provider if you think your medicine is not helping or if you have side effects  Tell him or her if you are allergic to any medicine  Keep a list of the medicines, vitamins, and herbs you take  Include the amounts, and when and why you take them  Bring the list or the pill bottles to follow-up visits  Carry your medicine list with you in case of an emergency  Activity:  Rest as much as possible  Try to keep all activities short  You may be able to do some exercise soon after you have your baby  Talk with your healthcare provider before you start exercising  If you work outside the home, ask when you can return to your job  Kegel exercises:  Kegel exercises may help your vaginal and rectal muscles heal faster  You can do Kegel exercises by tightening and relaxing the muscles around your vagina  Kegel exercises help make the muscles stronger  Breast care:  When your milk comes in, your breasts may feel full and hard  Ask how to care for your breasts, even if you are not breastfeeding  Constipation:  You may have constipation for a period of time after you have your baby  Do not try to push the bowel movement out if it is too hard  High-fiber foods and extra liquids can help you prevent constipation   Examples of high-fiber foods are fruit and bran  Prune juice and water are good liquids to drink  You may also be told to take over-the-counter fiber and stool softener medicines  Take these items as directed  Ask how to prevent or treat hemorrhoids  Perineum care: Your perineum is the area between your vagina and anus  Keep the area clean and dry  This will help it heal and prevent infection  Wash the area gently with soap and water when you bathe or shower  Rinse your perineum with warm water after you urinate or have a bowel movement  A warm sitz bath can help decrease pain  To take a sitz bath, fill a bathtub with 4 to 6 inches of warm water  You may also use a sitz bath pan that fits inside the toilet  Sit in the sitz bath for 20 minutes  Do this 2 to 3 times a day, or as directed  The warm water can help decrease pain and swelling  Vaginal discharge: You will have vaginal discharge, called lochia, after your delivery  The lochia is red or dark brown with clots for 1 to 3 days after the birth  The amount will decrease and turn pale pink or brown for 3 to 10 days  It will turn white or yellow on the 10th or 14th day  Use a sanitary pad instead of a tampon to prevent a vaginal infection  You will have lochia for up to 3 weeks after your baby is born  Monthly periods: Your period may start again within 7 to 9 weeks after your baby is born  If you are breastfeeding, it may take longer for your period to start again  You can still get pregnant again even though you do not have your monthly period  Talk with your healthcare provider about a birth control method if you do not want to get pregnant  Mood changes: Many new mothers have some kind of mood changes after delivery  Some of these changes occur because of lack of sleep, hormone changes, and caring for a new baby  Some mood changes can be more serious, such as postpartum depression   Talk with your healthcare provider if you feel unable to care for yourself or your baby   Sexual activity:  Do not have sex until your healthcare provider says it is okay  You may notice you have a decreased desire for sex, or sex may be painful  You may need to use a vaginal lubricant (gel) to help make sex more comfortable  Follow up with your doctor or obstetrician as directed:  Most women need to return 6 weeks after a vaginal delivery  Ask how to care for any wounds or stitches  Write down your questions so you remember to ask them during your visits  © Copyright [employer]  Information is for End User's use only and may not be sold, redistributed or otherwise used for commercial purposes  All illustrations and images included in CareNotes® are the copyrighted property of A D A M , Inc  or [employer]   The above information is an  only  It is not intended as medical advice for individual conditions or treatments  Talk to your doctor, nurse or pharmacist before following any medical regimen to see if it is safe and effective for you  Self Care After Delivery   AMBULATORY CARE:   The postpartum period  is the period of time from delivery to about 6 weeks  During this time you may experience many physical and emotional changes  It is important to understand what is normal and when you need to call your healthcare provider  It is also important to know how to care for yourself during this time  Call your local emergency number (911 in the [address] [place] [address],[address]) for any of the following:   · You see or hear things that are not there, or have thoughts of harming yourself or your baby  · You soak through 1 pad in 15 minutes, have blurry vision, clammy or pale skin, and feel faint  · You faint or lose consciousness  · You have trouble breathing  · You cough up blood  · Your  incision comes apart  Seek care immediately if:   · Your heart is beating faster than usual     · You have a bad headache or changes in your vision      · Your episiotomy or  incision is red, swollen, bleeding, or draining pus  · You have severe abdominal pain  Call your doctor or obstetrician if:   · Your leg is painful, red, and larger than usual     · You soak through 1 or more pads in an hour, or pass blood clots larger than a quarter from your vagina  · You have a fever  · You have new or worsening pain in your abdomen or vagina  · You continue to have depression 1 to 2 weeks after you deliver  · You have trouble sleeping  · You have foul-smelling discharge from your vagina  · You have pain or burning when you urinate  · You do not have a bowel movement for 3 days or more  · You have nausea or are vomiting  · You have hard lumps or red streaks over your breasts  · You have cracked nipples or bleed from your nipples  · You have questions or concerns about your condition or care  Physical changes: The following are normal changes after you give birth:  · Pain in the area between your anus and vagina    · Breast pain    · Constipation or hemorrhoids    · Hot or cold flashes    · Vaginal bleeding or discharge    · Mild to moderate abdominal cramping    · Difficulty controlling bowel movements or urine    Emotional changes:  A drop in hormone levels after you deliver may cause changes in your emotions  You may feel irritable, sad, or anxious  You may cry easily or for no reason  You may also feel depressed  Depression that continues can be a sign of postpartum depression, a condition that can be treated  Treatment may include talk therapy, medicines, or both  Healthcare providers will ask how you are feeling and if you have any depression  These talks can happen during appointments for your medical care and for your baby's care, such as well child visits  Providers can help you find ways to care for yourself and your baby   Talk to your providers about the following:  · When emotional changes or depression started, and if it is getting worse over time    · Problems you are having with daily activities, sleep, or caring for your baby    · If anything makes you feel worse, or makes you feel better    · Feeling that you are not bonding with your baby the way you want    · Any problems your baby has with sleeping or feeding    · Your baby is fussy or cries a lot    · Support you have from friends, family, or others    Breast care for breastfeeding mothers: You may have sore breasts for 3 to 6 days after you give birth  This happens as your milk begins to fill your breasts  You may also have sore breasts if you do not breastfeed frequently  Do the following to care for your breasts:  · Apply a moist, warm, compress to your breast as directed  This may help soothe your breasts  Make sure the washcloth is not too hot before you apply it to your breast     · Nurse your baby or pump your milk frequently  This may prevent clogged milk ducts  Ask your healthcare provider how often to nurse or pump  · Massage your breasts as directed  This may help increase your milk flow  Gently rub your breasts in a circular motion before you breastfeed  You may need to gently squeeze your breast or nipple to help release milk  You can also use a breast pump to help release milk from your breast     · Wash your breasts with warm water only  Do not put soap on your nipples  Soap may cause your nipples to become dry  · Apply lanolin cream to your nipples as directed  Lanolin cream may add moisture to your skin and prevent nipple dryness  Always  wash off lanolin cream with warm water before you breastfeed  · Place pads in your bra  Your nipples may leak milk when you are not breastfeeding  You can place pads inside of your bra to help prevent leaking onto your clothing  Ask your healthcare provider where to purchase bra pads  · Get breastfeeding support if needed  Healthcare providers can answer questions about breastfeeding and provide you with support  Ask your healthcare provider who you can contact if you need breastfeeding support  Breast care for non-breastfeeding mothers:  Milk will fill your breasts even if you bottle feed your baby  Do the following to help stop your milk from filling your breasts and causing pain:  · Wear a bra with support at all times  A sports bra or a tight-fitting bra will help stop your milk from coming in  · Apply ice on each breast for 15 to 20 minutes every hour or as directed  Use an ice pack, or put crushed ice in a plastic bag  Cover it with a towel before you apply it to your breast  Ice helps your milk ducts shrink  · Keep your breasts away from warm water  Warm water will make it easier for milk to fill your breasts  Stand with your breasts away from warm water in the shower  · Limit how much you touch your breasts  This will prevent them from filling with milk  Perineum care: Your perineum is the area between your rectum and vagina  It is normal to have swelling and pain in this area after you give birth  If you had an episiotomy, your healthcare provider may give you special instructions  · Clean your perineum after you use the bathroom  This may prevent infection and help with healing  Use a spray bottle with warm water to clean your perineum  You may also gently spray warm water against your perineum when you urinate  Always wipe front to back  · Take a sitz bath as directed  A sitz bath may help relieve swelling and pain  Fill your bath tub or bucket with water up to your hips and sit in the water  Use cold water for 2 days after you deliver  Then use warm water  Ask your healthcare provider for more information about a sitz bath  · Apply ice packs for the first 24 hours or as directed  Use a plastic glove filled with ice or buy an ice pack  Wrap the ice pack or plastic glove in a small towel or wash cloth  Place the ice pack on your perineum for 20 minutes at a time      · Sit on a donut-shaped pillow  This may relieve pressure on your perineum when you sit  · Use wipes that contain medicine or take pills as directed  Your healthcare provider may tell you to use witch hazel pads  You can place witch hazel pads in the refrigerator before you apply them to your perineum  Your provider may also tell you to take NSAIDs  Ask him or her how often to take pills or use the wipes  · Do not go swimming or take tub baths for 4 to 6 weeks or as directed  This will help prevent an infection in your vagina or uterus  Bowel and bladder care: It may take 3 to 5 days to have a bowel movement after you deliver your baby  You can do the following to prevent or manage constipation, and get control of your bowel or bladder:  · Take stool softeners as directed  A stool softener is medicine that will make your bowel movements softer  This may prevent or relieve constipation  A stool softener may also make bowel movements less painful  · Drink plenty of liquids  Ask how much liquid to drink each day and which liquids are best for you  Liquids may help prevent constipation  · Eat foods high in fiber  Examples include fruits, vegetables, grains, beans, and lentils  Ask your healthcare provider how much fiber you need each day  Fiber may prevent constipation  · Do Kegel exercises as directed  Kegel exercises will help strengthen the muscles that control bowel movements and urination  Ask your healthcare provider for more information on Kegel exercises  · Apply cold compresses or medicine to hemorrhoids as directed  This may relieve swelling and pain  Your healthcare provider may tell you to apply ice or wipes that contain medicine to your hemorrhoids  He or she may also tell you to use a sitz bath  Ask your provider for more information on how to manage hemorrhoids  Nutrition:  Good nutrition is important in the postpartum period   It will help you return to a healthy weight, increase your energy levels, and prevent constipation  It will also help you get enough nutrients and calories if you are going to breastfeed your baby  · Eat a variety of healthy foods  Healthy foods include fruits, vegetables, whole-grain breads, low-fat dairy products, beans, lean meats, and fish  You may need 500 to 700 extra calories each day if you breastfeed your baby  You may also need extra protein  · Limit foods with added sugar and high amounts of fat  These foods are high in calories and low in healthy nutrients  Read food labels so you know how much sugar and fat is in the food you want to eat  · Drink 8 to 10 glasses of water per day  Water will help you make plenty of milk for your baby  It will also help prevent constipation  Drink a glass of water every time you breastfeed your baby  · Take vitamins as directed  Ask your healthcare provider what vitamins you need  · Limit caffeine and alcohol if you are breastfeeding  Caffeine and alcohol can get into your breast milk  Caffeine and alcohol can make your baby fussy  They can also interfere with your baby's sleep  Ask your healthcare provider if you can drink alcohol or caffeine  Rest and sleep: You may feel very tired in the postpartum period  Enough sleep will help you heal and give you energy to care for your baby  The following may help you get sleep and rest:  · Nap when your baby naps  Your baby may nap several times during the day  Get rest during this time  · Limit visitors  Many people may want to see you and your baby  Ask friends or family to visit on different days  This will give you time to rest     · Do not plan too much for one day  Put off household chores so that you have time to rest  Gradually do more each day  · Ask for help from family, friends, or neighbors  Ask them to help you with laundry, cleaning, or errands  Also ask someone to watch the baby while you take a nap or relax   Ask your partner to help with the care of your baby  Pump some of your breast milk so your partner can feed your baby during the night  Exercise after delivery:  Wait until your healthcare provider says it is okay to exercise  Exercise can help you lose weight, increase your energy levels, and manage your mood  It can also prevent constipation and blood clots  Start with gentle exercises such as walking  Do more as you have more energy  You may need to avoid abdominal exercises for 1 to 2 weeks after you deliver  Talk to your healthcare provider about an exercise plan that is right for you  Sexual activity after delivery:   · Do not have sex until your healthcare provider says it is okay  You may need to wait 4 to 6 weeks before you have sex  This may prevent infection and allow time to heal     · Your menstrual cycle may begin as soon as 3 weeks after you deliver  Your period may be delayed if you breastfeed your baby  You can become pregnant before you get your first postpartum period  Talk to your healthcare provider about birth control that is right for you  Some types of birth control are not safe during breastfeeding  For support and more information:  Join a support group for new mothers  Ask for help from family and friends with chores, errands, and care of your baby  · Office of Women's Health,  Department of Health and Human Services  [address], [address] [facility] [facility] [address]  [address], [address] [facility]  [address] [facility] [address]  Phone: 0- 410 - [phone]  Web Address: www womenshealth gov    · March of [facility] Postpartum [address] [facility] , [address] [place] [address]  [address] [facility] , [address] [place] [address]  Web Address: [email or web address]    Follow up with your doctor or obstetrician as directed: You will need to follow up within 2 to 6 weeks of delivery   Write down your questions so you remember to ask them at your visits  © Copyright [employer] 2021 Information is for End User's use only and may not be sold, redistributed or otherwise used for commercial purposes  All illustrations and images included in CareNotes® are the copyrighted property of A D A M , Inc  or Drea [employer]  The above information is an  only  It is not intended as medical advice for individual conditions or treatments  Talk to your doctor, nurse or pharmacist before following any medical regimen to see if it is safe and effective for you  COVID-19 (Coronavirus Disease 2019)   WHAT YOU NEED TO KNOW:   What do I need to know about coronavirus disease 2019 (COVID-19)? COVID-19 is the disease caused by a coronavirus first discovered in December 2019  Coronaviruses generally cause upper respiratory (nose, throat, and lung) infections, such as a cold  The new virus spreads quickly and easily  The virus can be spread starting 2 days before symptoms even begin  The virus has also changed into several new forms (called variants) since it was discovered  The variants may be more contagious (easily spread) than the original form  Some may also cause more severe illness than others  It is important to follow local, national, and worldwide measures to protect yourself and others from infection  What are the signs and symptoms of COVID-19? You may not develop any signs or symptoms  Signs and symptoms usually start about 5 days after infection but can take 2 to 14 days  You may feel like you have the flu or a bad cold  Some signs and symptoms go away in a few days  Others can last weeks, months, or possibly years  Information on COVID-19 is still being learned   Tell your healthcare provider if you think you were infected but develop signs or symptoms not listed below:  · A cough    · Shortness of breath or trouble breathing that may become severe    · A fever of at least 100 4°F, or 38°C (may be lower in adults 65 or older)    · Chills that might include shaking    · Muscle pain, body aches, or a headache    · A sore throat    · Suddenly not being able to taste or smell anything    · Feeling mentally and physically tired (fatigue)    · Congestion (stuffy head and nose), or a runny nose    · Diarrhea, nausea, or vomiting    How is COVID-19 diagnosed? If you think you have COVID-19, call your healthcare provider  He or she will tell you what to do based on your symptoms and testing guidelines in your area  In general, the following may be used:  · A viral test  shows if you have a current infection  Samples are taken from your nose and throat, usually with swabs  You may need to wait several days to get the test results  Your healthcare provider will tell you how to get your results  You will need to quarantine (stay physically away from others) until you get your results  If results show you have COVID-19, you will need to continue until you are well  Your provider or other health official may give you more directions  You will also need to prevent another infection until it is known if you can get COVID-19 again  · An antibody test  shows if you had a past infection  Blood samples are used for this test  Antibodies are made by your immune system to attack the virus that causes COVID-19  Antibodies will form 1 to 3 weeks after you are infected  It is not known if antibodies prevent a second infection, or for how long a person might be protected  If you have antibodies, you will still need to be careful around others until more is known  · CT scans or x-rays  may be used to check for signs of pneumonia  The 2019 coronavirus causes a specific kind of pneumonia, usually in both lungs  The pictures may also be used to check for health problems in other parts of your body  How is COVID-19 treated? Treatment such as monoclonal antibodies and convalescent plasma have emergency use authorization (EUA)   This means they may be given only to patients who are hospitalized with severe signs and symptoms  The following may be used to manage your symptoms:  · Mild symptoms  may get better on their own  If you do not need to be treated in a hospital, you will be given instructions to use at home  Your condition will be closely monitored  You will need to watch for worsening symptoms and seek immediate care if needed  Talk to your healthcare provider about the following:    ? Decongestants  help reduce nasal congestion and help you breathe more easily  If you take decongestant pills, they may make you feel restless or cause problems with your sleep  Do not use decongestant sprays for more than a few days  ? Cough suppressants  help reduce coughing  Ask your healthcare provider which type of cough medicine is best for you  ? To soothe a sore throat,  gargle with warm salt water, or use throat lozenges or a throat spray  Drink more liquids to thin and loosen mucus and to prevent dehydration  ? NSAIDs or acetaminophen  can help lower a fever and relieve body aches or a headache  Follow directions  If not taken correctly, NSAIDs can cause kidney damage and acetaminophen can cause liver damage  · Severe or life-threatening symptoms  are treated in the hospital  You may need a combination of the following:    ? Medicines  may be given to lower or prevent inflammation or to fight the virus  You may also need blood thinners to prevent or treat blood clots  If you have a deep vein thrombosis (DVT) or pulmonary embolism (PE), you may need to keep using blood thinners for 3 months  ? Extra oxygen  may be given if you have respiratory failure  This means your lungs cannot get enough oxygen into your blood and out to your organs  Extra oxygen can help prevent organ failure  ? A ventilator  may be used to help you breathe  What do I need to know about health problems the virus may cause?   Serious health problems may improve or continue for weeks, months, and possibly years  Health problems that continue may be called long COVID  Anyone can develop serious problems from this virus, but your risk is higher if you are 72 or older  A weak immune system, diabetes, or a heart or lung condition can also increase your risk  Your risk is also higher if you are a current or former cigarette smoker  COVID-19 can lead to any of the following:  · Multisymptom inflammatory syndrome in adults (MIS-A) or in children (MIS-C), causing inflammation in the heart, digestive system, skin, or brain    · Serious lower respiratory conditions, such as pneumonia or acute respiratory distress syndrome (ARDS)    · Blood vessel damage, leading to blood clots    · Organ damage from a lack of oxygen or from blood clots    · Sleep problems    · Problems thinking clearly, remembering information, or concentrating    · Mood changes, depression, or anxiety    How does the 2019 coronavirus spread? The following are ways the virus is thought to spread, but more information may be coming:  · Droplets are the main way all coronaviruses spread  The virus travels in droplets that form when a person talks, coughs, or sneezes  The droplets can also float in the air for minutes or hours  Infection happens when you breathe in the droplets or get them in your eyes or nose  Close personal contact with an infected person increases your risk for infection  This means being within 6 feet (2 meters) of the person for at least 15 minutes over 24 hours  · Person-to-person contact can spread the virus  For example, a person with the virus on his or her hands can spread it by shaking hands with someone  · The virus can stay on objects and surfaces for a short time  You may become infected by touching the object or surface and then touching your eyes or mouth  · An infected animal may be able to infect a person who touches it  This may happen at live markets or on a farm      What do I need to know about COVID-19 vaccines? Get a vaccine even if you already had COVID-19  · COVID-19 vaccines are given as a shot in 1 or 2 doses  Some vaccines have emergency use authorization (EUA)  An EUA means the vaccine is not approved but is given because the benefits outweigh the risks  A 2-dose vaccine is fully approved for use in those 16 years or older  This vaccine also has an EUA for adolescents 12 to 15 years  Your healthcare provider can help you understand the benefits and risks  · A third dose is recommended for adults with a weakened immune system who get a 2-dose vaccine  The third dose is given at least 28 days after the second  · Even after you get the vaccine, continue social distancing and other measures  Experts are still learning how well the vaccines work to prevent infection, transmission, and severe illness  Although rare, you can become infected after you get the vaccine  You may also be able to pass the virus to others without knowing you are infected  · After you get the vaccine, check local, national, and international travel rules  Check to see if you need to be tested before you travel  You may also need to quarantine after you return  Some countries require proof of a negative test before you leave  You should also be tested 3 to 5 days after you return from another country  How can I help lower the risk for COVID-19? The best way to prevent infection is to avoid anyone who is infected, but this can be hard to do  An infected person can spread the virus before signs or symptoms begin, or even if signs or symptoms never develop  The following can help lower the risk for infection:      · Wash your hands often throughout the day  Use soap and water  Rub your soapy hands together, lacing your fingers, for at least 20 seconds  Rinse with warm, running water  Dry your hands with a clean towel or paper towel   Use hand  that contains alcohol if soap and water are not available  Teach children how to wash their hands and use hand   · Cover sneezes and coughs  Turn your face away and cover your mouth and nose with a tissue  Throw the tissue away  Use the bend of your arm if a tissue is not available  Then wash your hands well with soap and water or use hand   Teach children how to cover a cough or sneeze  · Wear a face covering (mask) around anyone who does not live in your home  Use a cloth covering with at least 2 layers  You can also create layers by putting a cloth covering over a disposable non-medical mask  Cover your mouth and your nose  The covering should fit snugly against the bridge of your nose  Securely fasten it under your chin and on the sides of your face  Do not  wear a plastic face shield instead of a covering  Continue social distancing and washing your hands often  A face covering is not a substitute for social distancing safety measures  · Follow worldwide, national, and local social distancing guidelines  Keep at least 6 feet (2 meters) between you and others  Also keep this distance from anyone who comes to your home, such as someone making a delivery  Wear a face covering while you are around others  You will need to wear a covering in restaurants, stores, and other public buildings  You will also need a covering on mass transit, such as a bus, subway, or airplane  Remember to use a covering made from thick material or wear 2 coverings together  · Make a habit of not touching your face  If you get the virus on your hands, you can transfer it to your eyes, nose, or mouth and become infected  You can also transfer it to objects, surfaces, or people  Do not touch your eyes, nose, or mouth without washing your hands first     · Clean and disinfect high-touch surfaces and objects often  Use disinfecting wipes, or make a solution of 4 teaspoons of bleach in 1 quart (4 cups) of water   Clean and disinfect even if you think no one living in or coming to your home is infected with the virus  · Ask about other vaccines you may need  Get the influenza (flu) vaccine as soon as recommended each year, usually starting in September or October  Get the pneumonia vaccine if recommended  Your healthcare provider can tell you if you should also get other vaccines, and when to get them  How do I follow social distancing guidelines to help lower the risk for COVID-19? National and local social distancing rules vary  Rules may change over time as restrictions are lifted  Restrictions may return if an outbreak happens where you live  It is important to know and follow all current social distancing rules in your area  The following are general guidelines:  · Stay home if you are sick or think you may have COVID-19  It is important to stay home if you are waiting for a testing appointment or for test results  Even if you do not have symptoms, you can pass the virus to others  · Limit trips out of your home  Have food, medicines, and other supplies delivered and left at your door or other area, if possible  Plan trips out of your home so you make the fewest stops possible to limit close personal contact  Keep track of places you go  This will help contact tracers notify others if you become infected  · Avoid close physical contact with anyone who does not live in your home  Do not shake hands with, hug, or kiss a person as a greeting  If you must use public transportation (such as a bus or subway), try to sit or stand away from others  Only allow necessary people into your home  Wear your face covering, and remind others to wear a face covering  Remind them to wash their hands when they arrive and before they leave  Do not  let someone into your home or go to someone's home just to visit  Even if you both do not feel sick, the virus can pass from one of you to the other  · Avoid in-person gatherings and crowds    Gatherings or crowds of 10 or more individuals can cause the virus to spread  Avoid places such as j[place], beaches, sporting events, and tourist attractions  For events such as parties, holiday meals, Islam services, and conferences, attend virtually (on a computer), if possible  · Ask your healthcare provider for other ways to have appointments  Some providers offer phone, video, or other types of appointments  You may also be able to get prescriptions for a few months of your medicines at a time  · Stay safe if you must go out to work  Keep physical distance between you and other workers as much as possible  Follow your employer's rules so everyone stays safe  What should I do if I have COVID-19 and am recovering at home? Healthcare providers will give you specific instructions to follow  The following are general guidelines to remind you how to keep others safe until you are well:  · Wash your hands often  Use soap and water as much as possible  Use hand  that contains alcohol if soap and water are not available  Dry your hands with a clean towel or paper towel  Do not share towels with anyone  If you use paper towels, throw them away in a lined trash can kept in your room or area  Use a covered trash can, if possible  · Do not go out of your home unless it is necessary  Ask someone who is not infected to go out for groceries or supplies, or have them delivered  Do not go to your healthcare provider's office without an appointment  · Only have close physical contact with a person giving direct care, or a baby or child you must care for  Family members and friends should not visit you  If possible, stay in a separate area or room of your home if you live with others  No one should go into the area or room except to give you care  You can visit with others by phone, video chat, e-mail, or similar systems  · Wear a face covering while others are near you    This can help prevent droplets from spreading the virus when you talk, sneeze, or cough  Put the covering on before anyone comes into your room or area  Remind the person to cover his or her nose and mouth before coming in to provide care for you  · Do not share items  Do not share dishes, towels, or other items with anyone  Items need to be washed after you use them  · Protect your baby  Some newborns have tested positive for the virus  It is not known if they became infected before or after birth  The highest risk is when a  has close contact with an infected person  If you are pregnant or breastfeeding, talk to your healthcare provider or obstetrician about any concerns you have  He or she will tell you when to bring your baby in for check-ups and vaccines  He or she will also tell you what to do if you think your baby was infected with the coronavirus  Wash your hands and put on a clean face covering before you breastfeed or care for your baby  · Do not handle live animals unless it is necessary  Some animals, including pets, have been infected with the new coronavirus  Ask someone who is not infected to take care of your pet until you are well  If you must care for a pet, wear a face covering  Wash your hands before and after you give care  Talk to your healthcare provider about how to keep a service animal safe, if needed  · Follow directions from your healthcare provider for being around others after you recover  It is not known if or for how long a recovered person can pass the virus to others  Your provider may give you instructions, such as continuing social distancing and wearing a face covering  He or she will tell you when it is okay to be around others again  This may be 10 to 20 days after symptoms started or you had a positive test  Most symptoms will also need to be gone  Your provider will give you more information  Where can I find more information?    · Centers for Disease Control and Prevention  [address] [place] [address] Drive , [address] [place] [address]  Phone: [phone]  Web Address: [employer] com     What should I do if I think I or someone I know may be infected? Do the following to protect others:  · If emergency care is needed,  tell the  about the possible infection, or call ahead and tell the emergency department  · Call a healthcare provider  for instructions if symptoms are mild  Anyone who may be infected should not  arrive without calling first  The provider will need to protect staff members and other patients  · The person who may be infected needs to wear a face covering  while getting medical care  This will help lower the risk of infecting others  Coverings are not used for anyone who is younger than 2 years, has breathing problems, or cannot remove it  The provider can give you instructions for anyone who cannot wear a covering  Call your local emergency number (911 in the [address] [place] [address],[address]) or an emergency department if:   · You have trouble breathing or shortness of breath at rest     · You have chest pain or pressure that lasts longer than 5 minutes  · You become confused or hard to wake  · Your lips or face are blue  · You have a fever of 104°F (40°C) or higher  When should I call my doctor? · You do not  have symptoms of COVID-19 but had close physical contact within 14 days with someone who tested positive  · You have questions or concerns about your condition or care  CARE AGREEMENT:   You have the right to help plan your care  Learn about your health condition and how it may be treated  Discuss treatment options with your healthcare providers to decide what care you want to receive  You always have the right to refuse treatment  The above information is an  only  It is not intended as medical advice for individual conditions or treatments   Talk to your doctor, nurse or pharmacist before following any medical regimen to see if it is safe and effective for you   © Copyright [employer] 2021 Information is for End User's use only and may not be sold, redistributed or otherwise used for commercial purposes   All illustrations and images included in CareNotes® are the copyrighted property of [employer] M [employer] Inc  or Drea Riley

## 2022-02-01 NOTE — UTILIZATION REVIEW
Notification of Discharge   This is a Notification of Discharge from our facility [address]  Please be advised that this patient has been discharge from our facility  Below you will find the admission and discharge date and time including the patients disposition  UTILIZATION REVIEW CONTACT:  Antony Jackson  Utilization   Network Utilization Review Department  Phone: [phone] x carefully listen to the prompts  All voicemails are confidential   Email: [email or web address]     PHYSICIAN ADVISORY SERVICES:  FOR [id number] REVIEW - MEDICAL NECESSITY DENIAL  Phone: [phone]  Fax: [phone]  Email: [email or web address]     PRESENTATION DATE: 1/24/2022  6:52 AM  OBERVATION ADMISSION DATE:   INPATIENT ADMISSION DATE: 1/24/22  6:59 AM   DISCHARGE DATE: 1/26/2022  2:50 PM  DISPOSITION: Home/Self Care Home/Self Care      IMPORTANT INFORMATION:  Send all requests for admission clinical reviews, approved or denied determinations and any other requests to dedicated fax number below belonging to the campus where the patient is receiving treatment   List of dedicated fax numbers:  [address] DENIALS (Administrative/Medical Necessity) [phone]   [address] (Maternity/NICU/Pediatrics) [phone]   [facility] [phone]   130 [facility] [phone]   [address] [place] [address] [phone]   [address] [phone]   [facility]  [phone]   [address] [place], [place] [address] [facility] 1000 [facility] [phone]

## 2022-03-07 ENCOUNTER — OFFICE VISIT (OUTPATIENT)
Dept: FAMILY MEDICINE CLINIC | Facility: CLINIC | Age: 27
End: 2022-03-07
Payer: COMMERCIAL

## 2022-03-07 VITALS
DIASTOLIC BLOOD PRESSURE: 60 MMHG | WEIGHT: 116.8 LBS | BODY MASS INDEX: 22.93 KG/M2 | OXYGEN SATURATION: 98 % | TEMPERATURE: 96.5 F | HEIGHT: 60 IN | HEART RATE: 78 BPM | SYSTOLIC BLOOD PRESSURE: 102 MMHG

## 2022-03-07 DIAGNOSIS — Z11.1 SCREENING FOR TUBERCULOSIS: ICD-10-CM

## 2022-03-07 DIAGNOSIS — Z00.00 WELL ADULT EXAM: Primary | ICD-10-CM

## 2022-03-07 PROCEDURE — 99395 PREV VISIT EST AGE 18-39: CPT | Performed by: FAMILY MEDICINE

## 2022-03-07 PROCEDURE — 3725F SCREEN DEPRESSION PERFORMED: CPT | Performed by: FAMILY MEDICINE

## 2022-03-07 PROCEDURE — 86580 TB INTRADERMAL TEST: CPT

## 2022-03-07 PROCEDURE — 3008F BODY MASS INDEX DOCD: CPT | Performed by: FAMILY MEDICINE

## 2022-03-07 NOTE — PROGRESS NOTES
Assessment/Plan: guidance given overall  PPD done at this time  Tetanus shot up-to-date  Form completed  Follow-up yearly or as needed  Diagnoses and all orders for this visit:    Well adult exam            Subjective:        Patient ID: Vidhi Garber is a 31 y o  female  Patient is here for wellness exam   Patient needs form completed for work  Patient will need PPD  Patient feeling fairly well overall  Patient did go for eye exam recently  No new chest pain shortness breath or abdominal pain or problems urinating or defecating or other complaints at present time  The following portions of the patient's history were reviewed and updated as appropriate: allergies, current medications, past family history, past medical history, past social history, past surgical history and problem list       Review of Systems   Constitutional: Negative  HENT: Negative  Eyes: Negative  Respiratory: Negative  Cardiovascular: Negative  Gastrointestinal: Negative  Endocrine: Negative  Genitourinary: Negative  Musculoskeletal: Negative  Skin: Negative  Allergic/Immunologic: Negative  Neurological: Negative  Hematological: Negative  Psychiatric/Behavioral: Negative  Objective:               /60 (BP Location: Right arm, Patient Position: Sitting, Cuff Size: Standard)   Pulse 78   Temp (!) 96 5 °F (35 8 °C) (Tympanic)   Ht 5' (1 524 m)   Wt 53 kg (116 lb 12 8 oz)   LMP 05/01/2021   SpO2 98%   BMI 22 81 kg/m²          Physical Exam  Vitals and nursing note reviewed  Constitutional:       General: She is not in acute distress  Appearance: Normal appearance  She is not ill-appearing, toxic-appearing or diaphoretic  HENT:      Head: Normocephalic and atraumatic  Right Ear: Tympanic membrane, ear canal and external ear normal  There is no impacted cerumen        Left Ear: Tympanic membrane, ear canal and external ear normal  There is no impacted cerumen  Nose: Nose normal  No congestion or rhinorrhea  Mouth/Throat:      Mouth: Mucous membranes are moist       Pharynx: No oropharyngeal exudate or posterior oropharyngeal erythema  Eyes:      General: No scleral icterus  Right eye: No discharge  Left eye: No discharge  Extraocular Movements: Extraocular movements intact  Conjunctiva/sclera: Conjunctivae normal       Pupils: Pupils are equal, round, and reactive to light  Neck:      Vascular: No carotid bruit  Cardiovascular:      Rate and Rhythm: Normal rate and regular rhythm  Pulses: Normal pulses  Heart sounds: Normal heart sounds  No murmur heard  No friction rub  No gallop  Pulmonary:      Effort: Pulmonary effort is normal  No respiratory distress  Breath sounds: Normal breath sounds  No stridor  No wheezing, rhonchi or rales  Chest:      Chest wall: No tenderness  Abdominal:      General: Abdomen is flat  Bowel sounds are normal  There is no distension  Palpations: Abdomen is soft  Tenderness: There is no abdominal tenderness  There is no guarding or rebound  Musculoskeletal:         General: No swelling, tenderness, deformity or signs of injury  Normal range of motion  Cervical back: Normal range of motion and neck supple  No rigidity  No muscular tenderness  Right lower leg: No edema  Left lower leg: No edema  Lymphadenopathy:      Cervical: No cervical adenopathy  Skin:     General: Skin is warm and dry  Capillary Refill: Capillary refill takes less than 2 seconds  Coloration: Skin is not jaundiced  Findings: No bruising, erythema, lesion or rash  Neurological:      Mental Status: She is alert and oriented to person, place, and time  Mental status is at baseline  Cranial Nerves: No cranial nerve deficit  Sensory: No sensory deficit  Motor: No weakness        Coordination: Coordination normal       Gait: Gait normal  Psychiatric:         Mood and Affect: Mood normal          Behavior: Behavior normal          Thought Content:  Thought content normal          Judgment: Judgment normal

## 2022-07-18 ENCOUNTER — NURSE TRIAGE (OUTPATIENT)
Dept: OTHER | Facility: OTHER | Age: 27
End: 2022-07-18

## 2022-07-18 NOTE — TELEPHONE ENCOUNTER
Patient calling in stating that she was sent home from work today for covid symptoms of congestion and headache  Patient is not vaccinated and her work told her she would need to follow up with her PCP for her symptoms before returning to work  Patient scheduled for a VV on 7/19 at 9:15  Reason for Disposition   [1] COVID-19 diagnosed by positive lab test (e g , PCR, rapid self-test kit) AND [2] mild symptoms (e g , cough, fever, others) AND [7] no complications or SOB    Answer Assessment - Initial Assessment Questions  Were you within 6 feet or less, for up to 15 minutes or more with a person that has a confirmed COVID-19 test? Unsure   What was the date of your exposure? Unsure   Are you experiencing any symptoms attributed to the virus?  (Assess for SOB, cough, fever, difficulty breathing) congestion, headache     HIGH RISK: Do you have any history heart or lung conditions, weakened immune system, diabetes, Asthma, CHF, HIV, COPD, Chemo, renal failure, sickle cell, etc? None     PREGNANCY: Are you pregnant or did you recently give birth?  No     VACCINE: "Have you gotten the COVID-19 vaccine?" If Yes ask: "Which one, how many shots, when did you get it?" not vaccine    Protocols used: CORONAVIRUS (COVID-19) DIAGNOSED OR SUSPECTED-ADULT-[place]

## 2022-07-19 ENCOUNTER — TELEMEDICINE (OUTPATIENT)
Dept: FAMILY MEDICINE CLINIC | Facility: CLINIC | Age: 27
End: 2022-07-19
Payer: COMMERCIAL

## 2022-07-19 DIAGNOSIS — B34.9 VIRAL INFECTION, UNSPECIFIED: ICD-10-CM

## 2022-07-19 DIAGNOSIS — R50.9 FEVER, UNSPECIFIED FEVER CAUSE: Primary | ICD-10-CM

## 2022-07-19 PROCEDURE — 0241U HB NFCT DS VIR RESP RNA 4 TRGT: CPT | Performed by: FAMILY MEDICINE

## 2022-07-19 PROCEDURE — 99213 OFFICE O/P EST LOW 20 MIN: CPT | Performed by: FAMILY MEDICINE

## 2022-07-19 NOTE — PROGRESS NOTES
COVID-19 Outpatient Progress Note    Assessment/Plan:    Problem List Items Addressed This Visit    None     Visit Diagnoses     Viral infection, unspecified    -  Primary         Disposition:     Recommended patient to come to the office to test for COVID-19/Influenza/RSV  I have spent 25 minutes directly with the patient  Greater than 50% of this time was spent in counseling/coordination of care regarding: instructions for management and patient and family education  Encounter provider Sonia Arora DO    Provider located at [id number] [place] [id number]    Recent Visits  No visits were found meeting these conditions  Showing recent visits within past 7 days and meeting all other requirements  Today's Visits  Date Type Provider Dept   07/19/22 Telemedicine Zuleika Tabor DO [address] [facility] today's visits and meeting all other requirements  Future Appointments  No visits were found meeting these conditions  Showing future appointments within next 150 days and meeting all other requirements       Patient agrees to participate in a virtual check in via telephone or video visit instead of presenting to the office to address urgent/immediate medical needs  Patient is aware this is a billable service  After connecting through [facility], the patient was identified by name and date of birth  Maxwell Conner was informed that this was a telemedicine visit and that the exam was being conducted confidentially over secure lines  My office door was closed  No one else was in the room  Maxwell Conner acknowledged consent and understanding of privacy and security of the telemedicine visit  I informed the patient that I have reviewed her record in Epic and presented the opportunity for her to ask any questions regarding the visit today  The patient agreed to participate      Verification of patient location:  Patient is located in the following state in which I hold an active license: [place]    Subjective:   Marco Gunn is a 31 y o  female who is concerned about COVID-19  Patient's symptoms include nasal congestion  Patient denies fever, chills, fatigue, malaise, rhinorrhea, sore throat, anosmia, loss of taste, cough, shortness of breath, chest tightness, abdominal pain, nausea, vomiting, diarrhea, myalgias and headaches  - Date of symptom onset: 2022      COVID-19 vaccination status: Not vaccinated    Exposure:   Contact with a person who is under investigation (PUI) for or who is positive for COVID-19 within the last 14 days?: No    Hospitalized recently for fever and/or lower respiratory symptoms?: No      Currently a healthcare worker that is involved in direct patient care?: No      Works in a special setting where the risk of COVID-19 transmission may be high? (this may include long-term care, correctional and MCC facilities; homeless shelters; assisted-living facilities and group homes ): No      Resident in a special setting where the risk of COVID-19 transmission may be high? (this may include long-term care, correctional and MCC facilities; homeless shelters; assisted-living facilities and group homes ): No      Lab Results   Component Value Date    SARSCOV2 Positive (A) 2021    [facility] Not Detected 2020    [address] [facility] Not Detected 2020     Past Medical History:   Diagnosis Date    Anemia     Former smoker     Stop smoking 2020    Miscarriage     Varicella     vaccine     Past Surgical History:   Procedure Laterality Date     SECTION      Patient states anxiety attack which affected fetal tracing necessitating       DILATION AND EVACUATION  2016    Elective termination    DILATION AND EVACUATION  2018    Persistent Missed pregnancy    IN SURG RX MISSED ABORTN,1ST TRI N/A 2020    Procedure: DILATATION AND EVACUATION (D&E) ( 8 OF WEEKS); Surgeon: Adryan Saxena MD;  Location: [facility];  Service: Gynecology    WISDOM TOOTH EXTRACTION       Current Outpatient Medications   Medication Sig Dispense Refill    acetaminophen (TYLENOL) 325 mg tablet Take 2 tablets (650 mg total) by mouth every 4 (four) hours as needed for mild pain or headaches (Patient not taking: No sig reported)  0    ibuprofen (MOTRIN) 200 mg tablet Take 3 tablets (600 mg total) by mouth every 6 (six) hours as needed for mild pain or moderate pain (Patient not taking: No sig reported)      Prenatal Vit-Fe Fumarate-FA (PRENATAL VITAMIN PO) Take by mouth (Patient not taking: Reported on 7/19/2022)       No current facility-administered medications for this visit  Allergies   Allergen Reactions    Penicillins Hives       Review of Systems   Constitutional: Negative  Negative for chills, fatigue and fever  HENT: Positive for congestion and sinus pressure  Negative for rhinorrhea and sore throat  Eyes: Negative  Respiratory: Negative  Negative for cough, chest tightness and shortness of breath  Cardiovascular: Negative  Gastrointestinal: Negative  Negative for abdominal pain, diarrhea, nausea and vomiting  Endocrine: Negative  Genitourinary: Negative  Musculoskeletal: Negative  Negative for myalgias  Skin: Negative  Allergic/Immunologic: Negative  Neurological: Negative  Negative for headaches  Hematological: Negative  Psychiatric/Behavioral: Negative  Objective: There were no vitals filed for this visit  Physical Exam  Nursing note reviewed  Constitutional:       General: She is not in acute distress  Appearance: Normal appearance  She is not ill-appearing, toxic-appearing or diaphoretic  HENT:      Head: Normocephalic and atraumatic        Right Ear: External ear normal       Left Ear: External ear normal       Nose: Nose normal    Pulmonary:      Effort: Pulmonary effort is normal  Neurological:      Mental Status: She is alert  Psychiatric:         Mood and Affect: Mood normal          Thought Content: Thought content normal          VIRTUAL VISIT DISCLAIMER    [facility] verbally agrees to participate in [employer]  Pt is aware that [employer] could be limited without vital signs or the ability to perform a full hands-on physical Elvira Winside understands she or the provider may request at any time to terminate the video visit and request the patient to seek care or treatment in person

## 2022-07-20 LAB
FLUAV RNA RESP QL NAA+PROBE: NEGATIVE
FLUBV RNA RESP QL NAA+PROBE: NEGATIVE
RSV RNA RESP QL NAA+PROBE: NEGATIVE
SARS-COV-2 RNA RESP QL NAA+PROBE: NEGATIVE

## 2022-10-11 PROBLEM — Z00.00 WELL ADULT EXAM: Status: RESOLVED | Noted: 2022-03-07 | Resolved: 2022-10-11

## 2022-12-01 ENCOUNTER — TELEPHONE (OUTPATIENT)
Dept: OBGYN CLINIC | Facility: MEDICAL CENTER | Age: 27
End: 2022-12-01

## 2022-12-01 DIAGNOSIS — Z32.01 POSITIVE PREGNANCY TEST: Primary | ICD-10-CM

## 2022-12-01 NOTE — TELEPHONE ENCOUNTER
Patient called into office with positive pregnancy test  LMP is unknown, but estimated to be around mid October and before Halloween  Patient advised to have labs drawn at any time  Patient agreed to treatment plan and will call with any questions or concerns  Labs added to chart

## 2022-12-12 ENCOUNTER — HOSPITAL ENCOUNTER (EMERGENCY)
Facility: HOSPITAL | Age: 27
Discharge: HOME | End: 2022-12-12
Attending: EMERGENCY MEDICINE

## 2022-12-12 ENCOUNTER — APPOINTMENT (EMERGENCY)
Dept: ULTRASOUND IMAGING | Facility: HOSPITAL | Age: 27
End: 2022-12-12

## 2022-12-12 VITALS
WEIGHT: 112.43 LBS | HEART RATE: 67 BPM | OXYGEN SATURATION: 100 % | SYSTOLIC BLOOD PRESSURE: 111 MMHG | DIASTOLIC BLOOD PRESSURE: 62 MMHG | BODY MASS INDEX: 21.96 KG/M2 | TEMPERATURE: 98.3 F | RESPIRATION RATE: 19 BRPM

## 2022-12-12 DIAGNOSIS — Z3A.01 6 WEEKS GESTATION OF PREGNANCY: ICD-10-CM

## 2022-12-12 DIAGNOSIS — O23.40 UTI (URINARY TRACT INFECTION) DURING PREGNANCY: ICD-10-CM

## 2022-12-12 DIAGNOSIS — O21.9 EXCESSIVE VOMITING IN PREGNANCY: Primary | ICD-10-CM

## 2022-12-12 LAB
ABO GROUP BLD: NORMAL
ALBUMIN SERPL BCP-MCNC: 3.6 G/DL (ref 3.5–5)
ALP SERPL-CCNC: 32 U/L (ref 46–116)
ALT SERPL W P-5'-P-CCNC: 11 U/L (ref 12–78)
ANION GAP SERPL CALCULATED.3IONS-SCNC: 6 MMOL/L (ref 4–13)
AST SERPL W P-5'-P-CCNC: 19 U/L (ref 5–45)
B-HCG SERPL-ACNC: ABNORMAL MIU/ML (ref 0–11.6)
BACTERIA UR QL AUTO: ABNORMAL /HPF
BASOPHILS # BLD AUTO: 0.04 THOUSANDS/ÂΜL (ref 0–0.1)
BASOPHILS NFR BLD AUTO: 0 % (ref 0–1)
BILIRUB SERPL-MCNC: 0.44 MG/DL (ref 0.2–1)
BILIRUB UR QL STRIP: NEGATIVE
BUN SERPL-MCNC: 8 MG/DL (ref 5–25)
CALCIUM SERPL-MCNC: 8.7 MG/DL (ref 8.3–10.1)
CHLORIDE SERPL-SCNC: 102 MMOL/L (ref 96–108)
CLARITY UR: CLEAR
CLARITY, POC: CLEAR
CO2 SERPL-SCNC: 25 MMOL/L (ref 21–32)
COLOR UR: YELLOW
COLOR, POC: YELLOW
CREAT SERPL-MCNC: 0.61 MG/DL (ref 0.6–1.3)
EOSINOPHIL # BLD AUTO: 0.06 THOUSAND/ÂΜL (ref 0–0.61)
EOSINOPHIL NFR BLD AUTO: 1 % (ref 0–6)
ERYTHROCYTE [DISTWIDTH] IN BLOOD BY AUTOMATED COUNT: 13.9 % (ref 11.6–15.1)
EXT PREGNANCY TEST URINE: POSITIVE
EXT. CONTROL: ABNORMAL
GFR SERPL CREATININE-BSD FRML MDRD: 124 ML/MIN/1.73SQ M
GLUCOSE SERPL-MCNC: 84 MG/DL (ref 65–140)
GLUCOSE UR STRIP-MCNC: NEGATIVE MG/DL
HCT VFR BLD AUTO: 38.6 % (ref 34.8–46.1)
HGB BLD-MCNC: 12.9 G/DL (ref 11.5–15.4)
HGB UR QL STRIP.AUTO: NEGATIVE
IMM GRANULOCYTES # BLD AUTO: 0.02 THOUSAND/UL (ref 0–0.2)
IMM GRANULOCYTES NFR BLD AUTO: 0 % (ref 0–2)
KETONES UR STRIP-MCNC: NEGATIVE MG/DL
LEUKOCYTE ESTERASE UR QL STRIP: NEGATIVE
LYMPHOCYTES # BLD AUTO: 2.37 THOUSANDS/ÂΜL (ref 0.6–4.47)
LYMPHOCYTES NFR BLD AUTO: 23 % (ref 14–44)
MCH RBC QN AUTO: 31.5 PG (ref 26.8–34.3)
MCHC RBC AUTO-ENTMCNC: 33.4 G/DL (ref 31.4–37.4)
MCV RBC AUTO: 94 FL (ref 82–98)
MONOCYTES # BLD AUTO: 0.82 THOUSAND/ÂΜL (ref 0.17–1.22)
MONOCYTES NFR BLD AUTO: 8 % (ref 4–12)
MUCOUS THREADS UR QL AUTO: ABNORMAL
NEUTROPHILS # BLD AUTO: 7.07 THOUSANDS/ÂΜL (ref 1.85–7.62)
NEUTS SEG NFR BLD AUTO: 68 % (ref 43–75)
NITRITE UR QL STRIP: POSITIVE
NON-SQ EPI CELLS URNS QL MICRO: ABNORMAL /HPF
NRBC BLD AUTO-RTO: 0 /100 WBCS
PH UR STRIP.AUTO: 8 [PH] (ref 4.5–8)
PLATELET # BLD AUTO: 252 THOUSANDS/UL (ref 149–390)
PMV BLD AUTO: 10.4 FL (ref 8.9–12.7)
POTASSIUM SERPL-SCNC: 3.5 MMOL/L (ref 3.5–5.3)
PROT SERPL-MCNC: 7.4 G/DL (ref 6.4–8.4)
PROT UR STRIP-MCNC: NEGATIVE MG/DL
RBC # BLD AUTO: 4.1 MILLION/UL (ref 3.81–5.12)
RBC #/AREA URNS AUTO: ABNORMAL /HPF
RH BLD: POSITIVE
SODIUM SERPL-SCNC: 133 MMOL/L (ref 135–147)
SP GR UR STRIP.AUTO: 1.02 (ref 1–1.03)
UROBILINOGEN UR QL STRIP.AUTO: 0.2 E.U./DL
WBC # BLD AUTO: 10.38 THOUSAND/UL (ref 4.31–10.16)
WBC #/AREA URNS AUTO: ABNORMAL /HPF

## 2022-12-12 RX ORDER — ONDANSETRON 4 MG/1
4 TABLET, ORALLY DISINTEGRATING ORAL EVERY 8 HOURS PRN
Qty: 10 TABLET | Refills: 0 | Status: SHIPPED | OUTPATIENT
Start: 2022-12-12 | End: 2022-12-29 | Stop reason: SDUPTHER

## 2022-12-12 RX ORDER — DOXYLAMINE SUCCINATE AND PYRIDOXINE HYDROCHLORIDE, DELAYED RELEASE TABLETS 10 MG/10 MG 10; 10 MG/1; MG/1
2 TABLET, DELAYED RELEASE ORAL
Qty: 30 TABLET | Refills: 0 | Status: SHIPPED | OUTPATIENT
Start: 2022-12-12

## 2022-12-12 RX ORDER — ONDANSETRON 2 MG/ML
4 INJECTION INTRAMUSCULAR; INTRAVENOUS ONCE
Status: COMPLETED | OUTPATIENT
Start: 2022-12-12 | End: 2022-12-12

## 2022-12-12 RX ADMIN — ONDANSETRON 4 MG: 2 INJECTION INTRAMUSCULAR; INTRAVENOUS at 09:40

## 2022-12-12 RX ADMIN — SODIUM CHLORIDE 1000 ML: 0.9 INJECTION, SOLUTION INTRAVENOUS at 09:40

## 2022-12-12 NOTE — DISCHARGE INSTRUCTIONS
Take the Diclegis every evening to help reduce the nausea  You may take Zofran as needed for nausea/vomiting  Increase fluids for hydration  Follow up with your family doctor  Return to the ED for worsening symptoms including persistent vomiting or worsening abdominal pain      FU with OBGYN

## 2022-12-12 NOTE — ED PROVIDER NOTES
History  Chief Complaint   Patient presents with   • Vomiting     Pt reports vomiting this weekend, unable to keep water down  Pt states she is pregnant, unsure how many weeks  Hasn't seen obgyn yet  Pt presents to the ED with nausea and vomiting - x 3-4 days  Pt is pregnant - LMP was in Oct  17- took a home pregancy test 2 weeks ago and it was positive  Pt is   Vomiting 9x yesterday  Prior to Admission Medications   Prescriptions Last Dose Informant Patient Reported? Taking? Prenatal Vit-Fe Fumarate-FA (PRENATAL VITAMIN PO)   Yes No   Sig: Take by mouth   Patient not taking: Reported on 2022   acetaminophen (TYLENOL) 325 mg tablet   No No   Sig: Take 2 tablets (650 mg total) by mouth every 4 (four) hours as needed for mild pain or headaches   Patient not taking: No sig reported   ibuprofen (MOTRIN) 200 mg tablet   No No   Sig: Take 3 tablets (600 mg total) by mouth every 6 (six) hours as needed for mild pain or moderate pain   Patient not taking: No sig reported      Facility-Administered Medications: None       Past Medical History:   Diagnosis Date   • Anemia    • Former smoker     Stop smoking 2020   • Miscarriage    • Varicella     vaccine       Past Surgical History:   Procedure Laterality Date   •  SECTION      Patient states anxiety attack which affected fetal tracing necessitating   • DILATION AND EVACUATION  2016    Elective termination   • DILATION AND EVACUATION  2018    Persistent Missed pregnancy   • OR TX MISSED  FIRST TRIMESTER SURGICAL N/A 2020    Procedure: DILATATION AND EVACUATION (D&E) ( 8 OF WEEKS);   Surgeon: Lauren Elam MD;  Location: [facility] OR;  Service: Gynecology   • WISDOM TOOTH EXTRACTION         Family History   Problem Relation Age of Onset   • Depression Mother    • Hypertension Father    • Diabetes Father    • Asthma Brother    • Hyperlipidemia Brother      I have reviewed and agree with the history as documented  E-Cigarette/Vaping   • E-Cigarette Use Never User      E-Cigarette/Vaping Substances   • Nicotine No    • THC No    • CBD No    • Flavoring No    • Other No    • Unknown No      Social History     Tobacco Use   • Smoking status: Former     Types: Cigarettes   • Smokeless tobacco: Former   • Tobacco comments:     quit 09/2020   Vaping Use   • Vaping Use: Never used   Substance Use Topics   • Alcohol use: Not Currently   • Drug use: No       Review of Systems   Constitutional: Negative for fever  Gastrointestinal: Positive for nausea and vomiting  All other systems reviewed and are negative  Physical Exam  Physical Exam  Vitals and nursing note reviewed  Constitutional:       Appearance: She is well-developed  HENT:      Head: Normocephalic and atraumatic  Right Ear: External ear normal       Left Ear: External ear normal    Eyes:      Conjunctiva/sclera: Conjunctivae normal    Cardiovascular:      Rate and Rhythm: Normal rate and regular rhythm  Heart sounds: Normal heart sounds  Pulmonary:      Effort: Pulmonary effort is normal       Breath sounds: Normal breath sounds  Abdominal:      General: Bowel sounds are normal       Palpations: Abdomen is soft  Tenderness: There is abdominal tenderness in the suprapubic area  There is no right CVA tenderness or left CVA tenderness  Musculoskeletal:         General: Normal range of motion  Cervical back: Neck supple  Lymphadenopathy:      Cervical: No cervical adenopathy  Skin:     General: Skin is warm  Findings: No rash  Neurological:      Mental Status: She is alert     Psychiatric:         Behavior: Behavior normal          Vital Signs  ED Triage Vitals   Temperature Pulse Respirations Blood Pressure SpO2   12/12/22 0852 12/12/22 0852 12/12/22 0852 12/12/22 0854 12/12/22 0852   98 3 °F (36 8 °C) 63 16 107/53 100 %      Temp Source Heart Rate Source Patient Position - Orthostatic VS BP Location FiO2 (%) 12/12/22 0852 12/12/22 0852 12/12/22 1153 12/12/22 1153 --   Oral Monitor Sitting Right arm       Pain Score       12/12/22 1153       No Pain           Vitals:    12/12/22 0852 12/12/22 0854 12/12/22 1153   BP:  107/53 111/62   Pulse: 63  67   Patient Position - Orthostatic VS:   Sitting         Visual Acuity      ED Medications  Medications   sodium chloride 0 9 % bolus 1,000 mL (0 mL Intravenous Stopped 12/12/22 1045)   ondansetron (ZOFRAN) injection 4 mg (4 mg Intravenous Given 12/12/22 0940)       Diagnostic Studies  Results Reviewed     Procedure Component Value Units Date/Time    Urine Microscopic [[id number]]  (Abnormal) Collected: 12/12/22 0926    Lab Status: Final result Specimen: Urine, Clean Catch Updated: 12/12/22 1247     RBC, UA None Seen /hpf      WBC, UA 4-10 /hpf      Epithelial Cells Occasional /hpf      Bacteria, UA Innumerable /hpf      MUCUS THREADS Occasional    Pregnancy, hCG, quantitative [[id number]]  (Abnormal) Collected: 12/12/22 0937    Lab Status: Final result Specimen: Blood from Arm, Left Updated: 12/12/22 1037     HCG, Quant 110,222 mIU/mL     Narrative:       Expected Ranges:     Approximate               Approximate HCG  Gestation age          Concentration ( mIU/mL)  _____________          ______________________   Jb Marshall                      HCG values  0 2-1                       5-50  1-2                           2-3                         100-5000  3-4                         500-43982  4-5                         1000-63533  5-6                         [id number]  6-8                         [id number]  8-12                        [id number]      Comprehensive metabolic panel [[id number]]  (Abnormal) Collected: 12/12/22 0937    Lab Status: Final result Specimen: Blood from Arm, Left Updated: 12/12/22 1006     Sodium 133 mmol/L      Potassium 3 5 mmol/L      Chloride 102 mmol/L      CO2 25 mmol/L      ANION GAP 6 mmol/L      BUN 8 mg/dL      Creatinine 0 61 mg/dL Glucose 84 mg/dL      Calcium 8 7 mg/dL      AST 19 U/L      ALT 11 U/L      Alkaline Phosphatase 32 U/L      Total Protein 7 4 g/dL      Albumin 3 6 g/dL      Total Bilirubin 0 44 mg/dL      eGFR 124 ml/min/1 73sq m     Narrative:      [place] guidelines for Chronic Kidney Disease (CKD):   •  Stage 1 with normal or high GFR (GFR > 90 mL/min/1 73 square meters)  •  Stage 2 Mild CKD (GFR = 60-89 mL/min/1 73 square meters)  •  Stage 3A Moderate CKD (GFR = 45-59 mL/min/1 73 square meters)  •  Stage 3B Moderate CKD (GFR = 30-44 mL/min/1 73 square meters)  •  Stage 4 Severe CKD (GFR = 15-29 mL/min/1 73 square meters)  •  Stage 5 End Stage CKD (GFR <15 mL/min/1 73 square meters)  Note: GFR calculation is accurate only with a steady state creatinine    CBC and differential [[id number]]  (Abnormal) Collected: 12/12/22 0937    Lab Status: Final result Specimen: Blood from Arm, Left Updated: 12/12/22 0944     WBC 10 38 Thousand/uL      RBC 4 10 Million/uL      Hemoglobin 12 9 g/dL      Hematocrit 38 6 %      MCV 94 fL      MCH 31 5 pg      MCHC 33 4 g/dL      RDW 13 9 %      MPV 10 4 fL      Platelets 641 Thousands/uL      nRBC 0 /100 WBCs      Neutrophils Relative 68 %      Immat GRANS % 0 %      Lymphocytes Relative 23 %      Monocytes Relative 8 %      Eosinophils Relative 1 %      Basophils Relative 0 %      Neutrophils Absolute 7 07 Thousands/µL      Immature Grans Absolute 0 02 Thousand/uL      Lymphocytes Absolute 2 37 Thousands/µL      Monocytes Absolute 0 82 Thousand/µL      Eosinophils Absolute 0 06 Thousand/µL      Basophils Absolute 0 04 Thousands/µL     Urine culture [[id number]] Collected: 12/12/22 0926    Lab Status:  In process Specimen: Urine, Clean Catch Updated: 12/12/22 0936    POCT urinalysis dipstick [[id number]]  (Abnormal) Resulted: 12/12/22 0929    Lab Status: Final result Specimen: Urine Updated: 12/12/22 0929     Color, UA Yellow     Clarity, UA Clear     EXT Leukocytes, UA --     Nitrite, UA --     Protein, UA -- mg/dl      Glucose, UA --     Ketones, UA -- mg/dl      EXT Urobilinogen, UA --      Bilirubin, UA --     Blood, UA --    POCT pregnancy, urine [[id number]]  (Abnormal) Resulted: 12/12/22 0928    Lab Status: Final result Specimen: Urine Updated: 12/12/22 0929     EXT Preg Test, Ur Positive     Control Valid    Urine Macroscopic, POC [[id number]]  (Abnormal) Collected: 12/12/22 0926    Lab Status: Final result Specimen: Urine Updated: 12/12/22 0927     Color, UA Yellow     Clarity, UA Clear     pH, UA 8 0     Leukocytes, UA Negative     Nitrite, UA Positive     Protein, UA Negative mg/dl      Glucose, UA Negative mg/dl      Ketones, UA Negative mg/dl      Urobilinogen, UA 0 2 E U /dl      Bilirubin, UA Negative     Occult Blood, UA Negative     Specific Gravity, UA 1 020    Narrative:      CLINITEK RESULT                 US OB < 14 weeks with transvaginal   Final Result by Sharonda Sabillon MD (12/12 1126)      Single live intrauterine gestation at 6 weeks 6 days (range +/- 3 days)  [place] of 8/1/2023  Workstation performed: [id number]                    Procedures  Procedures         ED Course                                             MDM  Number of Diagnoses or Management Options  6 weeks gestation of pregnancy: new and requires workup  Excessive vomiting in pregnancy: new and requires workup     Amount and/or Complexity of Data Reviewed  Clinical lab tests: reviewed  Tests in the radiology section of CPT®: reviewed  Review and summarize past medical records: yes  Independent visualization of images, tracings, or specimens: yes    Risk of Complications, Morbidity, and/or Mortality  General comments: Instructions reviewed w pt       Patient Progress  Patient progress: improved      Disposition  Final diagnoses:   Excessive vomiting in pregnancy   6 weeks gestation of pregnancy     Time reflects when diagnosis was documented in both MDM as applicable and the Disposition within this note     Time User Action Codes Description Comment    12/12/2022 11:41 AM Eladia Mcqueen [O21 9] Excessive vomiting in pregnancy     12/12/2022 11:41 AM Eladia Mcqueen [Z3A 01] 6 weeks gestation of pregnancy       ED Disposition     ED Disposition   Discharge    Condition   Stable    Date/Time   Mon Dec 12, 2022 11:41 AM    Comment   Sandeep Snell discharge to home/self care                 Follow-up Information     Follow up With Specialties Details Why Contact Info    Kellie Bear MD Obstetrics and Gynecology, Gynecology, Obstetrics   [address] [place], [place] [id number]  [phone]            Discharge Medication List as of 12/12/2022 11:45 AM      START taking these medications    Details   Doxylamine-Pyridoxine (Diclegis) 10-10 MG TBEC Take 2 tablets by mouth daily at bedtime, Starting Mon 12/12/2022, Normal      ondansetron (ZOFRAN-ODT) 4 mg disintegrating tablet Take 1 tablet (4 mg total) by mouth every 8 (eight) hours as needed for nausea or vomiting for up to 7 days, Starting Mon 12/12/2022, Until Mon 12/19/2022 at 2359, Normal         CONTINUE these medications which have NOT CHANGED    Details   acetaminophen (TYLENOL) 325 mg tablet Take 2 tablets (650 mg total) by mouth every 4 (four) hours as needed for mild pain or headaches, Starting Wed 1/26/2022, No Print      ibuprofen (MOTRIN) 200 mg tablet Take 3 tablets (600 mg total) by mouth every 6 (six) hours as needed for mild pain or moderate pain, Starting Wed 1/26/2022, No Print      Prenatal Vit-Fe Fumarate-FA (PRENATAL VITAMIN PO) Take by mouth, Historical Med                 PDMP Review     None          ED Provider  Electronically Signed by           Alla Maldonado PA-C  12/12/22 4463

## 2022-12-14 LAB — BACTERIA UR CULT: ABNORMAL

## 2022-12-14 RX ORDER — CEFUROXIME AXETIL 250 MG/1
250 TABLET ORAL EVERY 12 HOURS SCHEDULED
Qty: 6 TABLET | Refills: 0 | Status: SHIPPED | OUTPATIENT
Start: 2022-12-14 | End: 2022-12-17

## 2022-12-14 NOTE — RESULT ENCOUNTER NOTE
LMOM cefuroxime was chosen second generation cephalosporin because of her allergy to amoxicillin which gives her hives

## 2022-12-21 ENCOUNTER — OFFICE VISIT (OUTPATIENT)
Dept: OBGYN CLINIC | Facility: CLINIC | Age: 27
End: 2022-12-21

## 2022-12-21 VITALS
WEIGHT: 112.4 LBS | BODY MASS INDEX: 22.07 KG/M2 | SYSTOLIC BLOOD PRESSURE: 104 MMHG | HEIGHT: 60 IN | DIASTOLIC BLOOD PRESSURE: 56 MMHG

## 2022-12-21 DIAGNOSIS — Z34.90 PREGNANCY AT EARLY STAGE: Primary | ICD-10-CM

## 2022-12-21 DIAGNOSIS — O21.9 EXCESSIVE VOMITING IN PREGNANCY: ICD-10-CM

## 2022-12-21 DIAGNOSIS — Z11.3 SCREENING EXAMINATION FOR STD (SEXUALLY TRANSMITTED DISEASE): ICD-10-CM

## 2022-12-21 NOTE — PROGRESS NOTES
Assessment/Plan:    1  Pregnancy at early stage  In 27 yo  at 6w2d gestation based upon dating US  Detailed histories obtained  G/C collected  PN education provided  RV ob intake  2  Screening examination for STD (sexually transmitted disease)    - Chlamydia/GC amplified DNA by PCR    3  Excessive vomiting in pregnancy  Discussed mgt strategies in addition to the zofran she is already using  *Referral to [facility] for hx of two PTDs  Needs test of cure urine culture for recent UTI (w/ PN labs)        Subjective:      Patient ID: Rosina Serrato is a 28 y o  female  HPI  NEW PATIENT    27 yo   Prior care with Dr Nathen Mosqueda    Pregnancy semi-planned   "Lyle Homans" is involved  Periods were irregular when she conceived  22 Quant ,424, blood type B+  22 dating US: [facility]UP at [address]  [facility] [place] [facility], [address]  Currently 8w 1d based on US   22 Urine culture, at ED: + ecoli; treated with Ceftin  2021 pap negative    Was seen in ER for projectile vomiting  Has been using zofran as needed and she is doing better  Vomiting up to 3 times a day  Pertinent Hx:  Hx of SAB x 2 at 9w   Hx of elective term x 2  Hx   Hx of PTD x 2 at 35 (? Cause, but required C/S due to fetal distress) and 34w (PPROM)  SGA babies  Agrees to blood transfusion if necessary  The following portions of the patient's history were reviewed and updated as appropriate: allergies, current medications, past family history, past medical history, past social history, past surgical history and problem list     Review of Systems   Constitutional: Negative for chills and fever  Respiratory: Negative for cough and shortness of breath  Gastrointestinal: Positive for nausea and vomiting  Genitourinary: Negative for pelvic pain and vaginal bleeding  Musculoskeletal: Negative for arthralgias           Objective:      /56 (BP Location: Right arm, Patient Position: Sitting, Cuff Size: Standard)   Ht 5' (1 524 m)   Wt 51 kg (112 lb 6 4 oz)   LMP 10/15/2022   BMI 21 95 kg/m²      Physical Exam  Constitutional:       General: She is not in acute distress  Appearance: Normal appearance  She is well-developed and normal weight  She is not ill-appearing or diaphoretic  HENT:      Head: Normocephalic and atraumatic  Eyes:      Pupils: Pupils are equal, round, and reactive to light  Pulmonary:      Effort: Pulmonary effort is normal    Abdominal:      General: Abdomen is flat  Palpations: Abdomen is soft  Genitourinary:     General: Normal vulva  Exam position: Lithotomy position  Labia:         Right: No rash, tenderness, lesion or injury  Left: No rash, tenderness, lesion or injury  Vagina: No signs of injury and foreign body  No vaginal discharge, erythema, tenderness or bleeding  Cervix: No cervical motion tenderness, discharge or friability  Uterus: Not enlarged and not tender  Adnexa:         Right: No mass or tenderness  Left: No mass or tenderness  Comments: Cervix long and closed    Skin:     General: Skin is warm and dry  Neurological:      General: No focal deficit present  Mental Status: She is alert and oriented to person, place, and time  Psychiatric:         Mood and Affect: Mood normal          Behavior: Behavior normal          Thought Content:  Thought content normal          Judgment: Judgment normal

## 2022-12-22 LAB
C TRACH DNA SPEC QL NAA+PROBE: NEGATIVE
N GONORRHOEA DNA SPEC QL NAA+PROBE: NEGATIVE

## 2022-12-23 ENCOUNTER — ULTRASOUND (OUTPATIENT)
Dept: OBGYN CLINIC | Facility: CLINIC | Age: 27
End: 2022-12-23

## 2022-12-23 DIAGNOSIS — O36.80X1 ENCOUNTER TO DETERMINE FETAL VIABILITY OF PREGNANCY, FETUS 1: Primary | ICD-10-CM

## 2022-12-23 NOTE — PROGRESS NOTES
SV DOS=204 BPM  CRL=21 2mm=8w5d  EDC=7/30/2023    UT=72 x 66 x 69 mm  RT OV=23 x 20 x 23 mm  LT OV=25 x 21 x 15 mm

## 2022-12-23 NOTE — Clinical Note
Please notify patient that she will have EDC of 8/1/2023 as per the u/s performed in the ED  The baby has grown appropriately Ensure that ob intake is scheduled

## 2022-12-27 ENCOUNTER — OB ABSTRACT (OUTPATIENT)
Dept: OBGYN CLINIC | Facility: CLINIC | Age: 27
End: 2022-12-27

## 2022-12-28 NOTE — PATIENT INSTRUCTIONS
Nausea and Vomiting in Pregnancy   WHAT YOU NEED TO KNOW:   What do I need to know about nausea and vomiting in pregnancy? Nausea and vomiting can happen any time of day  These symptoms usually start before the 9th week of pregnancy, and end by the 14th week (second trimester)  Some women can have nausea and vomiting for a longer time  These symptoms can affect some women throughout the entire pregnancy  Nausea and vomiting do not harm your baby  These symptoms can make it hard for you to do your daily activities  What causes or increases my risk for nausea and vomiting in pregnancy? The cause of nausea and vomiting in pregnancy is not known  Pregnancy causes changes in your hormones that may lead to nausea and vomiting  The following may increase your risk of nausea and vomiting:  Being pregnant with more than one baby    Nausea and vomiting in a past pregnancy    Having a sister or mother who had nausea and vomiting during pregnancy    History of migraine headaches or motion sickness    Being pregnant with a female baby    How is nausea and vomiting in pregnancy treated? Treatment for nausea and vomiting in pregnancy is usually not needed  You can make changes in the foods you eat and in your activities to help manage your symptoms  You may need to try several things to learn what works for you  Talk to your healthcare provider if your symptoms do not decrease with the changes suggested below  You may need vitamin B6 and medicine if these changes do not help, or your symptoms become severe  What nutrition changes can I make to manage nausea and vomiting? Eat small meals throughout the day instead of 3 large meals  You may be more likely to have nausea and vomiting when your stomach is empty  Eat foods that are low in fat and high in protein  Examples are lean meat, beans, turkey, and chicken without the skin   Eat a small snack, such as crackers, dry cereal, or a small sandwich before you go to bed     Eat some crackers or dry toast before you get out of bed in the morning  Get out of bed slowly  Sudden movements could cause you to get dizzy and nauseated  Eat bland foods when you feel nauseated  Examples of bland foods include dry toast, dry cereal, plain pasta, white rice, and bread  Other bland foods include saltine crackers, bananas, gelatin, and pretzels  Avoid spicy, greasy, and fried foods  Avoid any other foods that make you feel nauseated  Drink liquids that contain ginger  Drink ginger ale made with real ginger or ginger tea made with fresh grated ginger  Ginger capsules or ginger candies may also help to decrease nausea and vomiting  Drink liquids between meals instead of with meals  Wait at least 30 minutes after you eat to drink liquids  Drink small amounts of liquids often throughout the day to prevent dehydration  Ask how much liquid you should drink each day  What other changes can I make to manage nausea and vomiting? Avoid smells that bother you  Strong odors may cause nausea and vomiting to start, or make it worse  Take a short walk, turn on a fan, or try to sleep with the window open to get fresh air  When you are cooking, open windows to get rid of smells that may cause nausea  Do not brush your teeth right after you eat  if it makes you nauseated  Rest when you need to  Start activity slowly and work up to your usual routine as you start to feel better  Talk to your healthcare provider about your prenatal vitamins  Prenatal vitamins can cause nausea for some women  Try taking your prenatal vitamin at night or with a snack  If this change does not help, your healthcare provider may recommend a different type of vitamin  Do not use any medicines, vitamins, or supplements to manage your symptoms without asking your healthcare provider  Many medicines can harm an unborn baby  Light to moderate exercise  may help to decrease your symptoms   It may also help you to sleep better at night  Ask your healthcare provider about the best exercise plan for you  When should I seek immediate care? You have signs of dehydration  Examples are dark yellow urine, dry mouth and lips, dry skin, fast heartbeat, and urinating less than usual     You have severe abdominal pain  You feel too weak or dizzy to stand up  You see blood in your vomit or bowel movements  When should I call my doctor? You vomit more than 4 times in 1 day  You have not been able to keep liquids down for more than 1 day  You lose more than 2 pounds  You have a fever  Your nausea and vomiting continue longer than 14 weeks  You have questions or concerns about your condition or care  CARE AGREEMENT:   You have the right to help plan your care  Learn about your health condition and how it may be treated  Discuss treatment options with your healthcare providers to decide what care you want to receive  You always have the right to refuse treatment  The above information is an  only  It is not intended as medical advice for individual conditions or treatments  Talk to your doctor, nurse or pharmacist before following any medical regimen to see if it is safe and effective for you  © Copyright [address] 2022 Information is for End User's use only and may not be sold, redistributed or otherwise used for commercial purposes  All illustrations and images included in CareNotes® are the copyrighted property of A D A M [employer]  or Drea Aguilar   Pregnancy Diet   WHAT YOU NEED TO KNOW:   What is a healthy diet during pregnancy? A healthy diet during pregnancy is a meal plan that provides the amount of calories and nutrients you need during pregnancy  Your body needs extra calories and nutrients to support your growing baby  You need to gain the right amount of weight for a healthy baby and pregnancy   Babies born at a healthy weight have a lower risk of certain health problems at birth and later in life  A healthy diet may help you avoid gaining too much weight  Too much weight gain may cause problems for you during your pregnancy and delivery  What should I avoid or limit while I am pregnant? Do not drink alcohol during pregnancy  Alcohol can increase your risk of a miscarriage (losing your baby)  Your baby may also be born too small and have other health problems, such as learning problems later in life  Do not eat raw or undercooked foods  Examples include meat, poultry, eggs, fish, and shellfish (shrimp, crab, lobster)  Cook leftover foods and ready-to-eat foods such as hot dogs until they are steaming hot  Do not have anything that is not pasteurized  Pasteurization is a heating process that destroys bacteria  Do not have milk, juice, or cheese that has not been pasteurized  Cheeses include Brie, feta, Camembert, blue, and [place] cheeses  Limit caffeine to avoid possible health problems  It is not clear how caffeine affects pregnancy  Your dietitian can tell you how much caffeine is okay for you to have in a day or week  Caffeine may be found in coffee, tea, cola, sports drinks, and chocolate  Limit foods that contain mercury  Mercury is naturally found in almost all types of fish and shellfish  Some types of fish absorb higher levels of mercury that can be harmful to an unborn baby  Eat only fish and shellfish that are low in mercury  Each week, you may eat up to 12 ounces of fish or shellfish that have low levels of mercury  These include shrimp, canned light tuna, salmon, pollock, and catfish  Eat only 6 ounces of albacore (white) tuna per week  Albacore tuna has more mercury than canned tuna  Do not eat shark, swordfish, logan mackerel, or tilefish  Which foods can I eat while I am pregnant? Eat a variety of foods from each of the food groups listed below  Eat healthy foods even if you take a prenatal vitamin every day   Your dietitian will tell you how many servings you should have from each food group each day to get enough calories  The amount of calories you need depends on your daily activity, your weight before pregnancy, and current weight gain  In the first trimester, you usually do not need extra calories  In the second and third trimesters, most women should eat about 300 extra calories each day  Fruits and vegetables:  Half of your plate should contain fruits and vegetables  Fruits:  Choose fresh, canned, or dried fruit as often as possible  1 cup of sliced, diced, cooked, or canned fruit (canned in light syrup or 100% juice)    1 large peach, orange, or banana    ½ cup of dried fruit    1 cup of fruit juice    Vegetables:  Eat more dark green, red, and orange vegetables  Dark green vegetables include broccoli, spinach, leighton lettuce, and jade greens  Examples of orange and red vegetables are carrots, sweet potatoes, winter squash, oranges, and red peppers  1 cup of cooked or raw vegetables    1 cup of vegetable juice    2 cups of raw leafy greens    Grains:  Half of the grains you eat each day should be whole grains  Whole grains:      ½ cup of cooked brown rice or cooked oatmeal    1 cup (1 ounce) of whole-grain dry cereal    1 slice of 226% whole-wheat or rye bread    3 cups of popped popcorn    Other grains:      ½ cup of cooked white rice or pasta    ½ of an English muffin    1 small flour or corn tortilla    1 mini-bagel    Dairy foods:  Choose fat-free or low-fat pasteurized dairy foods:    1½ ounces of hard cheese (mozzarella, Swiss, cheddar)    1 cup (8 ounces) of low-fat or fat-free milk or yogurt    1 cup of low-fat frozen yogurt or pudding    Meat and other protein sources:  Choose lean meats and poultry  Bake, broil, and grill meat instead of frying it  Include a variety of mercury-free seafood in place of some meat and poultry each week   Eat a variety of protein foods:    ½ ounce of nuts (12 almonds, 24 pistachios, 7 walnut halves) or 1 tablespoon of peanut butter (1 ounce)    ¼ cup of soy tofu or tempeh (1 ounce)    1 egg    ¼ cup of cooked dried beans, peas, or lentils (1 ounce)    1 small chicken breast or 1 small trout (about 3 ounces)    1 salmon steak (4 to 6 ounces)    1 small lean hamburger (2 to 3 ounces)    Fats:  Limit saturated fats, trans fats, and cholesterol  These unhealthy fats are found in shortening, butter, stick margarine, and animal fat  Choose healthy fats such as polyunsaturated and monounsaturated fats:    1 tablespoon of canola, olive, corn, sunflower, or soybean oil    1 tablespoon of soft margarine    1 teaspoon of mayonnaise    2 tablespoons of salad dressing    ½ of an avocado    What do I need to know about vitamin and mineral supplements? Your healthcare provider will tell you if you need a supplement and the type you should take  Talk to your provider before you take any other kind of supplement, including herbal (natural) supplements  The following are general guidelines:  Folic acid is one of the most important vitamins during pregnancy  Your prenatal vitamins will also contain folic acid  Make sure you take your prenatal vitamin every day  If you forget to take your vitamin, do not take double the amount the next day  You need at least 609 mcg of folic acid each day before you get pregnant  Folic acid helps to form your baby's brain and spinal cord in early pregnancy  During pregnancy, your daily need for folic acid increases to about 600 mcg  Get folic acid each day by eating citrus fruits and juices, green leafy vegetables, liver, or dried beans  Folic acid is also added to foods such as breakfast cereals, bread products, flour, and pasta  You need about 30 mg of iron each day during pregnancy  Iron is a mineral the body needs to make hemoglobin, which is a part of red blood cells  Hemoglobin helps your blood carry oxygen from the lungs to the rest of your body  Foods that are good sources of iron are meat, poultry, fish, beans, spinach, and fortified cereals and breads  Your body will absorb iron better from non-meat sources if you have a source of vitamin C at the same time  Drink tea and coffee separately from iron-fortified foods and iron supplements  Calcium and vitamin D needs go up during pregnancy  Women who do not eat dairy products may need a calcium and vitamin D supplement  Talk to your dietitian about calcium supplements if you do not regularly eat good sources of calcium  The amount of calcium you need is about 1,300 mg if you are between 15and 25years old and 1,000 mg if you are 23to 48years old  If you cannot drink milk or eat dairy foods, try lactose-free or lactose-reduced milk or calcium-fortified soy milk  Ask your dietitian about pills you can take to help you digest milk products  Eat other drinks and foods that are fortified with calcium, such as orange juice  What diet changes may help morning sickness? Morning sickness is common during the first few months of pregnancy  You may feel nauseated, and you may vomit several times each day  To improve symptoms of morning sickness, eat small meals often instead of 3 large meals  Foods high in carbohydrate, such as crackers, dry toast, and pasta, may be easier for you to eat  Drink liquids between meals rather than with meals  What diet changes may help constipation? A high-fiber diet can improve the symptoms of constipation  Whole-grain breakfast cereals, whole-grain breads, and prune juice are high in fiber  Raw fruits and vegetables, and cooked beans are also good sources of fiber  It may also be helpful to increase your intake of fluids and get regular physical activity  Talk with your healthcare provider before you begin any exercise program        What diet changes may help heartburn? To improve the symptoms of heartburn, do not lie down right after you eat   When you do lie down, sleep with your head slightly elevated  Eat small, frequent meals instead of 3 large meals  It may also be helpful to avoid caffeine, chocolate, and spicy foods  What other healthy guidelines should I follow? Make sure you get enough protein, vitamin B12, and iron if you are a vegetarian or vegan  Some non-meat sources of these nutrients are fortified cereals, nut butters, soy products (tofu and soymilk), nuts, grains, and legumes  These nutrients are also found in eggs and milk products  Talk to your dietitian about how to manage cravings for certain foods  Foods that are high in calories, fat, and sugar should not replace healthy food choices  Some women have cravings for unusual substances such as katerine, dirt, laundry starch, ice, and chalk  This condition is called pica  These may lead to health problems such as anemia and cause other health problems  When should I call my dietitian or obstetrician? You are losing weight without trying  You have cravings for substances such as katerine, dirt, laundry starch, or ice  You have questions or concerns about your condition or care  CARE AGREEMENT:   You have the right to help plan your care  Discuss treatment options with your healthcare provider to decide what care you want to receive  You always have the right to refuse treatment  The above information is an  only  It is not intended as medical advice for individual conditions or treatments  Talk to your doctor, nurse or pharmacist before following any medical regimen to see if it is safe and effective for you  © Copyright [employer] 2022 Information is for End User's use only and may not be sold, redistributed or otherwise used for commercial purposes  All illustrations and images included in CareNotes® are the copyrighted property of A D A M [employer] Inc  or Drea Aguilar   Pregnancy   WHAT YOU NEED TO KNOW:   What do I need to know about pregnancy?   A normal pregnancy lasts about 40 weeks  The first trimester lasts from your last period through the 12th week of pregnancy  The second trimester lasts from the 13th week through the 23rd week  The third trimester lasts from the 24th week until your baby is born  If you know the date of your last period, your healthcare provider can estimate your due date  You may give birth to your baby any time from 37 weeks to 2 weeks after your due date  What is prenatal care? Prenatal care is a series of visits with your healthcare provider throughout your pregnancy  Prenatal care can help prevent problems during pregnancy and childbirth  At each prenatal visit, your healthcare provider will weigh you and check your blood pressure  He or she will also check your baby's heartbeat and growth  You may need the following at some visits:  A pelvic exam  allows your healthcare provider to see your cervix (the bottom part of your uterus)  Your provider will use a speculum to open your vagina  He or she will check the size and shape of your uterus  At your first prenatal visit, you may also have a Pap smear  This is a test to check your cervix for abnormal cells  Blood tests  may be done to check for any of the following:    Gestational diabetes or anemia (low iron level)    Blood type or Rh factor, or certain birth defects    Immunity to certain diseases, such as chickenpox or rubella    An infection, such as a sexually transmitted infection, HIV, or hepatitis B    Hepatitis B  may need to be prevented or treated  Hepatitis B is inflammation of the liver caused by the hepatitis B virus (HBV)  HBV can spread from a mother to her baby during delivery  You will be checked for HBV as early as possible in the first trimester of each pregnancy  You need the test even if you received the hepatitis B vaccine or were tested before  You may need to have an HBV infection treated before you give birth      Urine tests  may also be done to check for sugar and protein  These can be signs of gestational diabetes or preeclampsia  Urine tests may also be done to check for signs of infection  A gestational diabetes screen  may be done  Your healthcare provider may order either a 1-step or 2-step oral glucose tolerance test (OGTT)  1-step OGTT:  Your blood sugar level will be tested after you have not eaten for 8 hours (fasting)  You will then be given a glucose drink  Your level will be tested again 1 hour and 2 hours after you finish the drink  2-step OGTT:  You do not have to fast for the first part of the test  You will have the glucose drink at any time of day  Your blood sugar level will be checked 1 hour later  If your blood sugar is higher than a certain level, another test will be ordered  You will fast and your blood sugar level will be tested  You will have the glucose drink  Your blood will be tested again 1 hour, 2 hours, and 3 hours after you finish the glucose drink  A fetal ultrasound  shows pictures of your baby inside your uterus  The pictures are used to check your baby's development, movement, and position  Genetic disorder screening tests  may be offered to you  These tests check your baby's risk for genetic disorders such as Down syndrome  A screening test may include blood tests and an ultrasound  Blood tests may be used to check your DNA or your partner's DNA  Genetic tests are not always accurate or complete  Your baby may be born with a genetic disorder that did not show up in the tests  Talk to your healthcare provider about any concerns you have with genetic testing  What can I do to have a healthy pregnancy? Eat a variety of healthy foods  Healthy foods include fruits, vegetables, whole-grain breads, low-fat dairy foods, beans, lean meats, and fish  Drink liquids as directed  Ask how much liquid to drink each day and which liquids are best for you  Limit caffeine to less than 200 milligrams each day   Limit your intake of fish to 2 servings each week  Choose fish low in mercury such as canned light tuna, shrimp, crab, salmon, cod, or tilapia  Do not  eat fish high in mercury such as swordfish, tilefish, logan mackerel, and shark  Take prenatal vitamins as directed  Your need for certain vitamins and minerals, such as folic acid, increases during pregnancy  Prenatal vitamins provide some of the extra vitamins and minerals you need  Prenatal vitamins may also help to decrease the risk for certain birth defects  Ask how much weight you should gain during your pregnancy  Too much or too little weight gain can be unhealthy for you and your baby  Talk to your healthcare provider about exercise  Moderate exercise can help you stay fit  Your healthcare provider will help you plan an exercise program that is safe for you during pregnancy  Do not smoke  Smoking increases your risk for a miscarriage and heart and blood vessel problems  Smoking can cause your baby to be born too early or weigh less at birth  Quit smoking as soon as you think you might be pregnant  Ask your healthcare provider for information if you need help quitting  Do not drink alcohol  Alcohol passes from your body to your baby through the placenta  It can affect your baby's brain development and cause fetal alcohol syndrome (FAS)  FAS is a group of conditions that causes mental, behavior, and growth problems  Talk to your healthcare provider before you take any medicines  Many medicines may harm your baby if you take them when you are pregnant  Do not take any medicines, vitamins, herbs, or supplements without first talking to your healthcare provider  Never use illegal or street drugs (such as marijuana or cocaine) while you are pregnant  What body changes may happen during my pregnancy? Breast changes  you will experience include tenderness and tingling during the early part of your pregnancy  Your breasts will become larger  You may need to use a support bra  You may see a thin, yellow fluid, called colostrum, leak from your nipples during the second trimester  Colostrum is a liquid that changes to milk about 3 days after you give birth  Skin changes and stretch marks  may occur during your pregnancy  You may have red marks, called stretch marks, on your skin  Stretch marks will usually fade after pregnancy  Use lotion if your skin is dry and itchy  The skin on your face, around your nipples, and below your belly button may darken  Most of the time, your skin will return to its normal color after your baby is born  Morning sickness  is nausea and vomiting that can happen at any time of day  Avoid fatty and spicy foods  Eat small meals throughout the day instead of large meals  Rachel may help to decrease nausea  Ask your healthcare provider about other ways of decreasing nausea and vomiting  Heartburn  may be caused by changes in your hormones during pregnancy  Your growing uterus may also push your stomach upward and force stomach acid to back up into your esophagus  Eat 4 or 5 small meals each day instead of large meals  Avoid spicy foods  Avoid eating right before bedtime  Constipation  may develop during your pregnancy  To treat constipation, eat foods high in fiber such as fiber cereals, beans, fruits, vegetables, whole-grain breads, and prune juice  Get regular exercise and drink plenty of water  Your healthcare provider may also suggest a fiber supplement to soften your bowel movements  Talk to your healthcare provider before you use any medicines to decrease constipation  Hemorrhoids  are enlarged veins in the rectal area  They may cause pain, itching, and bright red bleeding from your rectum  To decrease your risk for hemorrhoids, prevent constipation and do not strain to have a bowel movement  If you have hemorrhoids, soak in a tub of warm water to ease discomfort   Ask your healthcare provider how you can treat hemorrhoids  Leg cramps and swelling  may be caused by low calcium levels or the added weight of pregnancy  Raise your legs above the level of your heart to decrease swelling  During a leg cramp, stretch or massage the muscle that has the cramp  Heat may help decrease pain and muscle spasms  Apply heat on your muscle for 20 to 30 minutes every 2 hours for as many days as directed  Back pain  may occur as your baby grows  Do not stand for long periods of time or lift heavy items  Use good posture while you stand, squat, or bend  Wear low-heeled shoes with good support  Rest may also help to relieve back pain  Ask your healthcare provider about exercises you can do to strengthen your back muscles  What are some safety tips during pregnancy? Avoid hot tubs and saunas  Do not use a hot tub or sauna while you are pregnant, especially during your first trimester  Hot tubs and saunas may raise your baby's temperature and increase the risk for birth defects  Avoid toxoplasmosis  This is an infection caused by eating raw meat or being around infected cat feces  It can cause birth defects, miscarriages, and other problems  Wash your hands after you touch raw meat  Make sure any meat is well-cooked before you eat it  Avoid raw eggs and unpasteurized milk  Use gloves or ask someone else to clean your cat's litter box while you are pregnant  Ask your healthcare provider about travel  The most comfortable time to travel is during the second trimester  Ask your healthcare provider if you can travel after 36 weeks  You may not be able to travel in an airplane after 36 weeks  He or she may also recommend that you avoid long road trips  When should I seek immediate care? You develop a severe headache that does not go away  You have new or increased vision changes, such as blurred or spotted vision  You have new or increased swelling in your face or hands      You have pain or cramping in your abdomen or low back  You have vaginal bleeding  When should I call my doctor or obstetrician? You have abdominal cramps, pressure, or tightening  You have a change in vaginal discharge  You cannot keep food or drinks down, and you are losing weight  You have chills or a fever  You have vaginal itching, burning, or pain  You have yellow, green, white, or foul-smelling vaginal discharge  You have pain or burning when you urinate, less urine than usual, or pink or bloody urine  You have questions or concerns about your condition or care  CARE AGREEMENT:   You have the right to help plan your care  Learn about your health condition and how it may be treated  Discuss treatment options with your healthcare providers to decide what care you want to receive  You always have the right to refuse treatment  The above information is an  only  It is not intended as medical advice for individual conditions or treatments  Talk to your doctor, nurse or pharmacist before following any medical regimen to see if it is safe and effective for you  © Copyright [employer] [facility] 2022 Information is for End User's use only and may not be sold, redistributed or otherwise used for commercial purposes   All illustrations and images included in CareNotes® are the copyrighted property of [employer]  or [address] [place] [address] [employer]

## 2022-12-29 ENCOUNTER — LAB (OUTPATIENT)
Dept: LAB | Facility: CLINIC | Age: 27
End: 2022-12-29

## 2022-12-29 ENCOUNTER — INITIAL PRENATAL (OUTPATIENT)
Dept: OBGYN CLINIC | Facility: CLINIC | Age: 27
End: 2022-12-29

## 2022-12-29 ENCOUNTER — ROUTINE PRENATAL (OUTPATIENT)
Dept: OBGYN CLINIC | Facility: CLINIC | Age: 27
End: 2022-12-29

## 2022-12-29 VITALS
DIASTOLIC BLOOD PRESSURE: 60 MMHG | BODY MASS INDEX: 22.1 KG/M2 | HEIGHT: 60 IN | WEIGHT: 112.6 LBS | SYSTOLIC BLOOD PRESSURE: 104 MMHG

## 2022-12-29 VITALS
HEIGHT: 60 IN | DIASTOLIC BLOOD PRESSURE: 60 MMHG | HEART RATE: 54 BPM | WEIGHT: 112.6 LBS | BODY MASS INDEX: 22.1 KG/M2 | SYSTOLIC BLOOD PRESSURE: 104 MMHG

## 2022-12-29 DIAGNOSIS — Z34.81 SUPERVISION OF NORMAL INTRAUTERINE PREGNANCY IN MULTIGRAVIDA, FIRST TRIMESTER: Primary | ICD-10-CM

## 2022-12-29 DIAGNOSIS — Z34.81 SUPERVISION OF NORMAL INTRAUTERINE PREGNANCY IN MULTIGRAVIDA, FIRST TRIMESTER: ICD-10-CM

## 2022-12-29 DIAGNOSIS — O20.9 BLEEDING IN EARLY PREGNANCY: ICD-10-CM

## 2022-12-29 DIAGNOSIS — O20.9 BLEEDING IN EARLY PREGNANCY: Primary | ICD-10-CM

## 2022-12-29 DIAGNOSIS — O21.9 EXCESSIVE VOMITING IN PREGNANCY: ICD-10-CM

## 2022-12-29 LAB
ABO GROUP BLD: NORMAL
AMORPH URATE CRY URNS QL MICRO: NORMAL
B-HCG SERPL-ACNC: ABNORMAL MIU/ML
BACTERIA UR QL AUTO: NORMAL /HPF
BASOPHILS # BLD AUTO: 0.04 THOUSANDS/ÂΜL (ref 0–0.1)
BASOPHILS NFR BLD AUTO: 0 % (ref 0–1)
BILIRUB UR QL STRIP: NEGATIVE
BLD GP AB SCN SERPL QL: NEGATIVE
CLARITY UR: ABNORMAL
COLOR UR: YELLOW
EOSINOPHIL # BLD AUTO: 0.08 THOUSAND/ÂΜL (ref 0–0.61)
EOSINOPHIL NFR BLD AUTO: 1 % (ref 0–6)
ERYTHROCYTE [DISTWIDTH] IN BLOOD BY AUTOMATED COUNT: 13.6 % (ref 11.6–15.1)
GLUCOSE UR STRIP-MCNC: NEGATIVE MG/DL
HBV SURFACE AG SER QL: NORMAL
HCT VFR BLD AUTO: 38.8 % (ref 34.8–46.1)
HGB BLD-MCNC: 12.5 G/DL (ref 11.5–15.4)
HGB UR QL STRIP.AUTO: NEGATIVE
HIV 1+2 AB+HIV1 P24 AG SERPL QL IA: NORMAL
HIV 2 AB SERPL QL IA: NORMAL
HIV1 AB SERPL QL IA: NORMAL
HIV1 P24 AG SERPL QL IA: NORMAL
IMM GRANULOCYTES # BLD AUTO: 0.03 THOUSAND/UL (ref 0–0.2)
IMM GRANULOCYTES NFR BLD AUTO: 0 % (ref 0–2)
KETONES UR STRIP-MCNC: NEGATIVE MG/DL
LEUKOCYTE ESTERASE UR QL STRIP: NEGATIVE
LYMPHOCYTES # BLD AUTO: 2.09 THOUSANDS/ÂΜL (ref 0.6–4.47)
LYMPHOCYTES NFR BLD AUTO: 21 % (ref 14–44)
MCH RBC QN AUTO: 30.5 PG (ref 26.8–34.3)
MCHC RBC AUTO-ENTMCNC: 32.2 G/DL (ref 31.4–37.4)
MCV RBC AUTO: 95 FL (ref 82–98)
MONOCYTES # BLD AUTO: 0.58 THOUSAND/ÂΜL (ref 0.17–1.22)
MONOCYTES NFR BLD AUTO: 6 % (ref 4–12)
NEUTROPHILS # BLD AUTO: 7.14 THOUSANDS/ÂΜL (ref 1.85–7.62)
NEUTS SEG NFR BLD AUTO: 72 % (ref 43–75)
NITRITE UR QL STRIP: NEGATIVE
NON-SQ EPI CELLS URNS QL MICRO: NORMAL /HPF
NRBC BLD AUTO-RTO: 0 /100 WBCS
PH UR STRIP.AUTO: 7.5 [PH]
PLATELET # BLD AUTO: 270 THOUSANDS/UL (ref 149–390)
PMV BLD AUTO: 11.5 FL (ref 8.9–12.7)
PROT UR STRIP-MCNC: ABNORMAL MG/DL
RBC # BLD AUTO: 4.1 MILLION/UL (ref 3.81–5.12)
RBC #/AREA URNS AUTO: NORMAL /HPF
RH BLD: POSITIVE
RPR SER QL: NORMAL
RUBV IGG SERPL IA-ACNC: 12.9 IU/ML
SP GR UR STRIP.AUTO: 1.02 (ref 1–1.03)
SPECIMEN EXPIRATION DATE: NORMAL
UROBILINOGEN UR STRIP-ACNC: <2 MG/DL
WBC # BLD AUTO: 9.96 THOUSAND/UL (ref 4.31–10.16)
WBC #/AREA URNS AUTO: NORMAL /HPF

## 2022-12-29 RX ORDER — ONDANSETRON 4 MG/1
4 TABLET, ORALLY DISINTEGRATING ORAL EVERY 8 HOURS PRN
Qty: 30 TABLET | Refills: 1 | Status: SHIPPED | OUTPATIENT
Start: 2022-12-29 | End: 2023-01-05

## 2022-12-29 NOTE — PROGRESS NOTES
Assessment/Plan:    Pt will RTO tomorrow for ultrasound  Results of today's quant not available yet  Script given to repeat quant in 2 days  ED parameters given  Zofran refilled  Diagnoses and all orders for this visit:    Bleeding in early pregnancy  -     hCG, quantitative; Future  -     hCG, quantitative; Future    Excessive vomiting in pregnancy  -     ondansetron (ZOFRAN-ODT) 4 mg disintegrating tablet; Take 1 tablet (4 mg total) by mouth every 8 (eight) hours as needed for nausea or vomiting for up to 7 days        Subjective:      Patient ID: Aubrey Johnson is a 28 y o  female  27 yo [id number] @ 9w 2d based on LMP presents for problem visit  She saw Zuleyka Do this morning for an intake and reported she has been bleeding since 12/25/22  The initial episode of bleeding went through her clothes and she has been spotting daily since  Last episode of bleeding last night  Denies cramping  Blood type B pos  She has a hx of 3 miscarriages  She does report significant nausea  Requesting refill of Zofran  She had PN blood work this morning and quant BHCG was included  The following portions of the patient's history were reviewed and updated as appropriate: allergies, current medications, past family history, past medical history, past social history, past surgical history and problem list     Review of Systems   Constitutional: Negative  HENT: Negative  Respiratory: Negative  Cardiovascular: Negative  Gastrointestinal: Positive for nausea and vomiting  Endocrine: Negative  Genitourinary: Positive for vaginal bleeding  Negative for difficulty urinating, dyspareunia, dysuria, frequency, pelvic pain, urgency, vaginal discharge and vaginal pain  Musculoskeletal: Negative  Skin: Negative  Neurological: Negative  Psychiatric/Behavioral: Negative            Objective:      /60 (BP Location: Left arm, Patient Position: Sitting, Cuff Size: Standard)   Pulse (!) 54   Ht 4' 11 5" (1 511 m)   Wt 51 1 kg (112 lb 9 6 oz)   LMP 10/15/2022   BMI 22 36 kg/m²          Physical Exam  Vitals and nursing note reviewed  Constitutional:       Appearance: Normal appearance  HENT:      Head: Normocephalic and atraumatic  Pulmonary:      Effort: Pulmonary effort is normal    Abdominal:      Hernia: There is no hernia in the left inguinal area or right inguinal area  Genitourinary:     General: Normal vulva  Exam position: Supine  Pubic Area: No rash  Labia:         Right: No rash, tenderness, lesion or injury  Left: No rash, tenderness, lesion or injury  Urethra: No urethral pain, urethral swelling or urethral lesion  Vagina: No signs of injury and foreign body  No vaginal discharge, erythema, tenderness, bleeding (no bleeding noted on exam today) or lesions  Uterus: Not deviated, not enlarged, not fixed and not tender  Adnexa:         Right: No mass, tenderness or fullness  Left: No mass, tenderness or fullness  Musculoskeletal:         General: Normal range of motion  Cervical back: Normal range of motion  Lymphadenopathy:      Lower Body: No right inguinal adenopathy  No left inguinal adenopathy  Skin:     General: Skin is warm and dry  Neurological:      Mental Status: She is alert and oriented to person, place, and time  Psychiatric:         Mood and Affect: Mood normal          Behavior: Behavior normal          Thought Content:  Thought content normal          Judgment: Judgment normal

## 2022-12-29 NOTE — PROGRESS NOTES
OB INTAKE INTERVIEW    OB History    Para Term  AB Living   8 3 1 2 4 3   SAB IAB Ectopic Multiple Live Births   2 2     3      # Outcome Date GA Lbr Mo/2nd Weight Sex Delivery Anes PTL Lv   8 Current            7 Term 22 38w2d / 00:27 2545 g (5 lb 9 8 oz) M Vag-Spont EPI N OLLIE   6 SAB      SAB      5 SAB 19 8w0d    SAB  N       Birth Comments: had D and C   4 IAB 2018           3  17 33w0d  2080 g (4 lb 9 4 oz) M CS-LTranv Spinal Y OLLIE      Birth Comments: Fetal bradycardia per [facility] chart (pre noted "Had anxiety attack affecting fetal status, had   ")      Complications: Fetal Intolerance   2 IAB 06/01/15 6w0d          1  14 34w3d  2080 g (4 lb 9 4 oz) F Vag-Spont EPI Y OLLIE      Birth Comments: Prolonged PPROM ( admitted 1/3) [facility], NICU x 12d, feeding/weight gain      Complications: Premature rupture of membranes during pregnancy, delivered, Rupture of membranes with delay of delivery, Small for gestational age infant      Obstetric Comments   Menarche 8   Irregular menses       Last Menstrual Period: Patient's last menstrual period was 10/15/2022  Estimated Date of Delivery: 2023    Florentin Rodriguez is a 28 y o  female  6 para 3 pregnant female who presents for her obstetrical intake  This pregnancy was planned  Father of the baby is involved  Pregnancy symptoms: breast tenderness, fatigue, nausea, vomiting and frequent urination          BMI: Body mass index is Body mass index is 22 36 kg/m²  Discussed appropriate weight gain in pregnancy based on pre-gravid BMI       Diabetes  First degree relative with type 2 diabetes:YES-father              Pregestational DM:  no              hx of GDM: no              hx of PCOS: no              prior hx of LGA/macrosomia:no           Hypertension   Age 28 or older:no   Obesity (BMI over 30):no              Hx of chronic HTN: no              hx of gestational HTN:no hx of preeclampsia, eclampsia, or HELLP syndrome: no              Family h/o preeclampsia:no  Renal Disease:no  Autoimmune disease (systemic lupus erythematosus, antiphospholipid antibody syndrome):no   Nulliparity:no              Multifetal gestation: no  More than 10 year pregnancy interval:no  Previous IUGR, low birthweight or small for gestational age:YES-delivered at 29 weeks and 34 weeks both babies were 4lb 9oz-patient states she was induced for IUGR 2022     Infection Screening              Does the pt have a hx of MRSA? no              Recent travel outside of [place]? no     Thyroid- if yes order TSH with reflex T4  History of thyroid disease no    Bleeding Disorder or Hx of DVT-patient or first degree relative with history of  Order the following if not done previously  (Factor V, antithrombin III, prothrombin gene mutation, protein C and S Ag, lupus anticoagulant, anticardiolipin, beta-2 glycoprotein)   no    OB/GYN-  History of abnormal pap smear no  History of HPV no  History of Herpes/HSV no  History of other STI (gonorrhea, chlamydia, trich) no    History of prior  YES  History of prior  YES  History of  delivery prior to 36 weeks 6 days YESx2    History of blood transfusion no  Ok for blood transfusion Yes     Substance screening  Social History     Tobacco Use   • Smoking status: Former     Types: Cigarettes   • Smokeless tobacco: Never   • Tobacco comments:     quit 2020   Vaping Use   • Vaping Use: Never used   Substance Use Topics   • Alcohol use: Not Currently   • Drug use: No       Genetic/MFM-  See prenatal genetic history screening in the prenatal episode for genetic history  Discussed carrier screening and consultation with MFM  Patient is not interested  Referral to MFM given  Cystic Fibrosis Screening offered  Patient was advised to check insurance coverage prior to testing  Patient is not interested    Was screening in   Prenatal lab work scripts given to patient for routine testing  Additional labs ordered: none    Breast Feeding  Breastfeeding benefits discussed and patient does desire to breastfeed  Immunizations:  Discussed Flu, COVID, and Tdap, vaccinations  Interview education  Anticipated course of prenatal care including how and when to contact providers reviewed  HIV and other prenatal labs and testing discussed  Genetic testing discussed  Patient will check insurance coverage  Indications for ultrasonography reviewed  Use of any medications (including supplements, vitamins, herbs or OTC drugs) discussed  Reinforced daily use of prenatal vitamin  Influenza, Covid, and Tdap vaccines counseled and recommendations reviewed  Proper dental care discussed  Weight gain counseling discussed including nutrition counseling; special diet, dietary precautions (mercury, listeriosis)  Reviewed exercise recommendations and routine restrictions for activity including no lifting greater than 20 lbs   Environmental/work hazards reviewed including temperature guidelines and no prolonged stationary standing  Avoidance of saunas, hot tubs, and tanning beds reviewed  Toxoplasmosis precautions (cats/raw meat/unwashed vegetables) discussed  Tobacco/smoking, alcohol, and illicit/recreation drug usage reviewed  Travel safety precautions reviewed including avoiding travel where risk of congenitally transmitted infection(s) is high  Proper seat belt use discussed  Pregnancy packet/folder provided and review with patient  Information on childbirth classes/hospital facilities and Baby and Me resources provided  The patient was oriented to our practice, reviewed delivering physicians and [facility] for Delivery  All questions were answered  Patient to return to the office for first routine prenatal appointment with the provider  This appointment is scheduled       Additional details that I feel the provider should be aware of: History of IUGR, missed ab x 2, history of  with last pregnancy, history of  delivery     Interviewed by: Rosalina Jane LPN

## 2022-12-30 ENCOUNTER — ULTRASOUND (OUTPATIENT)
Dept: OBGYN CLINIC | Facility: CLINIC | Age: 27
End: 2022-12-30

## 2022-12-30 DIAGNOSIS — O36.80X1 ENCOUNTER TO DETERMINE FETAL VIABILITY OF PREGNANCY, FETUS 1: Primary | ICD-10-CM

## 2022-12-30 DIAGNOSIS — O20.9 VAGINAL BLEEDING IN PREGNANCY, FIRST TRIMESTER: ICD-10-CM

## 2022-12-30 LAB — BACTERIA UR CULT: NORMAL

## 2022-12-30 NOTE — PROGRESS NOTES
SV AUM=216 BPM  CRL=29 8mm=9w6d  EDC=7/29/2023    UT=92 x 81 x 85 mm  RT OV=32 x 22 x 27 mm  LT OV=31 x 29 x 26 mm

## 2022-12-30 NOTE — Clinical Note
Pt with adequate fetal growth since last ultrasound  Will keep EDC of 8/1/2023 as established in early pregnancy

## 2023-01-20 PROBLEM — O09.299 IUGR (INTRAUTERINE GROWTH RESTRICTION) IN PRIOR PREGNANCY, PREGNANT: Status: ACTIVE | Noted: 2023-01-20

## 2023-01-23 ENCOUNTER — ROUTINE PRENATAL (OUTPATIENT)
Dept: PERINATAL CARE | Facility: OTHER | Age: 28
End: 2023-01-23

## 2023-01-23 VITALS
BODY MASS INDEX: 21.55 KG/M2 | SYSTOLIC BLOOD PRESSURE: 98 MMHG | WEIGHT: 109.8 LBS | DIASTOLIC BLOOD PRESSURE: 60 MMHG | HEIGHT: 60 IN | HEART RATE: 73 BPM

## 2023-01-23 DIAGNOSIS — Z3A.12 12 WEEKS GESTATION OF PREGNANCY: ICD-10-CM

## 2023-01-23 DIAGNOSIS — Z34.81 SUPERVISION OF NORMAL INTRAUTERINE PREGNANCY IN MULTIGRAVIDA, FIRST TRIMESTER: ICD-10-CM

## 2023-01-23 DIAGNOSIS — O09.299 IUGR (INTRAUTERINE GROWTH RESTRICTION) IN PRIOR PREGNANCY, PREGNANT: ICD-10-CM

## 2023-01-23 DIAGNOSIS — O34.219 HISTORY OF CESAREAN DELIVERY, ANTEPARTUM: ICD-10-CM

## 2023-01-23 DIAGNOSIS — O09.899 HISTORY OF PRETERM DELIVERY, CURRENTLY PREGNANT: Primary | ICD-10-CM

## 2023-01-23 DIAGNOSIS — Z36.82 ENCOUNTER FOR (NT) NUCHAL TRANSLUCENCY SCAN: ICD-10-CM

## 2023-01-23 RX ORDER — PROGESTERONE 200 MG/1
200 CAPSULE ORAL
Qty: 30 CAPSULE | Refills: 5 | Status: SHIPPED | OUTPATIENT
Start: 2023-01-23

## 2023-01-23 NOTE — PROGRESS NOTES
[address]: Ms Yrn Fairchild was seen today for nuchal translucency ultrasound  See ultrasound report under "OB Procedures" tab  Review of Systems   Constitutional: Negative for chills and fever  Early pregnancy weight loss of 8 lbs, now TWG 2 6 kg   HENT: Negative for congestion, dental problem, facial swelling and sore throat  Eyes: Negative for visual disturbance  Respiratory: Positive for shortness of breath  Negative for cough  SOB marked off on intake form submitted at end of visit  Message sent to patient on warning signs with SOB  Cardiovascular: Negative for chest pain and palpitations  Gastrointestinal: Negative for diarrhea and vomiting  Endocrine: Negative for polydipsia  Genitourinary: Negative for dysuria and vaginal bleeding  Musculoskeletal: Negative for back pain and joint swelling  Skin: Negative for rash and wound  Allergic/Immunologic: Negative for immunocompromised state  Neurological: Negative for seizures and headaches  Hematological: Does not bruise/bleed easily  Psychiatric/Behavioral: Negative for hallucinations and suicidal ideas  Physical Exam  Constitutional:       General: She is not in acute distress  Appearance: Normal appearance  She is not ill-appearing, toxic-appearing or diaphoretic  HENT:      Head: Normocephalic and atraumatic  Nose: No congestion or rhinorrhea  Eyes:      General: No scleral icterus  Right eye: No discharge  Left eye: No discharge  Extraocular Movements: Extraocular movements intact  Conjunctiva/sclera: Conjunctivae normal    Pulmonary:      Effort: Pulmonary effort is normal  No respiratory distress  Musculoskeletal:      Cervical back: Normal range of motion  Skin:     Coloration: Skin is not jaundiced or pale  Findings: No erythema, lesion or rash  Neurological:      General: No focal deficit present        Mental Status: She is alert and oriented to person, place, and time     Psychiatric:         Mood and Affect: Mood normal          Behavior: Behavior normal           Please don't hesitate to contact our office with any concerns or questions   -Peri Leyden, MD

## 2023-01-23 NOTE — LETTER
January 23, 2023     Latrice John, [address] [facility]  [place]   [id number]  [phone]    Patient: Robin Mosqueda   YOB: 1995   Date of Visit: 1/23/2023       Dear Dr Onur Comer: Thank you for referring Maryana Vaca to me for evaluation  Below are my notes for this consultation  If you have questions, please do not hesitate to call me  I look forward to following your patient along with you           Sincerely,        Yoni Ansari MD        CC: No Recipients

## 2023-02-07 ENCOUNTER — HOSPITAL ENCOUNTER (EMERGENCY)
Facility: HOSPITAL | Age: 28
Discharge: HOME | End: 2023-02-07
Attending: EMERGENCY MEDICINE

## 2023-02-07 VITALS
DIASTOLIC BLOOD PRESSURE: 52 MMHG | WEIGHT: 112.21 LBS | SYSTOLIC BLOOD PRESSURE: 92 MMHG | TEMPERATURE: 98.3 F | OXYGEN SATURATION: 100 % | BODY MASS INDEX: 22.29 KG/M2 | HEART RATE: 84 BPM | RESPIRATION RATE: 18 BRPM

## 2023-02-07 DIAGNOSIS — K52.9 GASTROENTERITIS: Primary | ICD-10-CM

## 2023-02-07 LAB
BILIRUB UR QL STRIP: NEGATIVE
CLARITY UR: CLEAR
COLOR UR: YELLOW
GLUCOSE UR STRIP-MCNC: NEGATIVE MG/DL
HGB UR QL STRIP.AUTO: NEGATIVE
KETONES UR STRIP-MCNC: NEGATIVE MG/DL
LEUKOCYTE ESTERASE UR QL STRIP: NEGATIVE
NITRITE UR QL STRIP: NEGATIVE
PH UR STRIP.AUTO: 6.5 [PH]
PROT UR STRIP-MCNC: NEGATIVE MG/DL
SP GR UR STRIP.AUTO: 1.02 (ref 1–1.03)
UROBILINOGEN UR QL STRIP.AUTO: 0.2 E.U./DL

## 2023-02-07 RX ORDER — ONDANSETRON 4 MG/1
4 TABLET, ORALLY DISINTEGRATING ORAL ONCE
Status: COMPLETED | OUTPATIENT
Start: 2023-02-07 | End: 2023-02-07

## 2023-02-07 RX ADMIN — ONDANSETRON 4 MG: 4 TABLET, ORALLY DISINTEGRATING ORAL at 11:39

## 2023-02-07 NOTE — ED PROVIDER NOTES
History  Chief Complaint   Patient presents with   • Vomiting     Pt is 15 weeks pregnany and c/o vomiting and abdominal cramping starting today around 3 am she can not hold any fluids or food down  Pt hasnt had morning sickness in 3 weeks  pt denies abnormal discharge or vaginal bleeding      Patient is a 74-year-old female coming in for evaluation of vomiting  Patient is 15 weeks pregnant  Denies any vaginal bleeding or change in vaginal discharge, or gush of fluids  Patient reports attempted to take a Zofran approximately 4 hours prior to arrival, with no significant relief  Unable to keep anything down  Patient does have a sick  at home with similar symptoms  Denies diarrhea      History provided by:  Patient   used: No    Vomiting  Severity:  Mild  Associated symptoms: abdominal pain    Associated symptoms: no chills, no diarrhea, no fever and no headaches        Prior to Admission Medications   Prescriptions Last Dose Informant Patient Reported? Taking? Doxylamine-Pyridoxine (Diclegis) 10-10 MG TBEC   No No   Sig: Take 2 tablets by mouth daily at bedtime   Patient not taking: Reported on 12/21/2022   Prenatal Vit-Fe Fumarate-FA (PRENATAL VITAMIN PO)   Yes No   Sig: Take by mouth   Progesterone 200 MG CAPS   No No   Sig: Insert 200 mg into the vagina at bedtime Start taking at 16 weeks of pregnancy  Stop taking at 36 weeks of pregnancy     acetaminophen (TYLENOL) 325 mg tablet   No No   Sig: Take 2 tablets (650 mg total) by mouth every 4 (four) hours as needed for mild pain or headaches   ibuprofen (MOTRIN) 200 mg tablet   No No   Sig: Take 3 tablets (600 mg total) by mouth every 6 (six) hours as needed for mild pain or moderate pain   Patient not taking: Reported on 7/19/2022   ondansetron (ZOFRAN-ODT) 4 mg disintegrating tablet   No No   Sig: Take 1 tablet (4 mg total) by mouth every 8 (eight) hours as needed for nausea or vomiting for up to 7 days      Facility-Administered Medications: None       Past Medical History:   Diagnosis Date   • Anemia    • Former smoker     Stop smoking 2020   • Miscarriage     x 2   • Varicella     vaccine       Past Surgical History:   Procedure Laterality Date   •  SECTION  2017    Patient states anxiety attack which affected fetal tracing necessitating   • DILATION AND EVACUATION  2016    Elective termination   • DILATION AND EVACUATION  2018    Persistent Missed pregnancy   • [place] TX MISSED  FIRST TRIMESTER SURGICAL N/A 2020    Procedure: DILATATION AND EVACUATION (D&E) ( 8 OF WEEKS); Surgeon: Rochelle Ochoa MD;  Location: [facility] OR;  Service: Gynecology   • WISDOM TOOTH EXTRACTION         Family History   Problem Relation Age of Onset   • Depression Mother    • Stroke Father    • Hypertension Father    • Diabetes Father    • No Known Problems Brother    • No Known Problems Brother    • No Known Problems Brother    • No Known Problems Brother    • No Known Problems Brother    • No Known Problems Brother    • No Known Problems Daughter    • No Known Problems Son    • No Known Problems Son    • Cancer Paternal Grandmother         unknown type   • Breast cancer Neg Hx    • Colon cancer Neg Hx    • Ovarian cancer Neg Hx    • Uterine cancer Neg Hx    • Deep vein thrombosis Neg Hx    • Pulmonary embolism Neg Hx    • Venous thrombosis Neg Hx    • Heart attack Neg Hx    • Heart disease Neg Hx    • Miscarriages / Stillbirths Neg Hx    • Seizures Neg Hx    • Thyroid disease Neg Hx    • Transient ischemic attack Neg Hx      I have reviewed and agree with the history as documented      E-Cigarette/Vaping   • E-Cigarette Use Never User      E-Cigarette/Vaping Substances   • Nicotine No    • THC No    • CBD No    • Flavoring No    • Other No    • Unknown No      Social History     Tobacco Use   • Smoking status: Former     Types: Cigarettes   • Smokeless tobacco: Never   • Tobacco comments:     quit 2020   Vaping Use   • Vaping Use: Never used   Substance Use Topics   • Alcohol use: Not Currently   • Drug use: No       Review of Systems   Constitutional: Negative for chills and fever  Gastrointestinal: Positive for abdominal pain, nausea and vomiting  Negative for diarrhea  Neurological: Negative for dizziness, numbness and headaches  Physical Exam  Physical Exam  Vitals reviewed  Constitutional:       Appearance: Normal appearance  She is normal weight  HENT:      Head: Normocephalic and atraumatic  Right Ear: External ear normal       Left Ear: External ear normal       Nose: Nose normal    Eyes:      Conjunctiva/sclera: Conjunctivae normal    Cardiovascular:      Rate and Rhythm: Normal rate  Pulmonary:      Effort: Pulmonary effort is normal    Abdominal:      Palpations: Abdomen is soft  Tenderness: There is abdominal tenderness  There is no guarding  Comments: Minimal tenderness, relatively benign    Bedside U/s shows approximately 130 fetal HR   Musculoskeletal:         General: Normal range of motion  Cervical back: Normal range of motion  Skin:     General: Skin is warm and dry  Neurological:      Mental Status: She is alert           Vital Signs  ED Triage Vitals   Temperature Pulse Respirations Blood Pressure SpO2   02/07/23 1040 02/07/23 1039 02/07/23 1039 02/07/23 1040 02/07/23 1039   98 3 °F (36 8 °C) 84 18 92/52 100 %      Temp Source Heart Rate Source Patient Position - Orthostatic VS BP Location FiO2 (%)   02/07/23 1040 -- 02/07/23 1039 02/07/23 1039 --   Oral  Sitting Right arm       Pain Score       --                  Vitals:    02/07/23 1039 02/07/23 1040   BP:  92/52   Pulse: 84    Patient Position - Orthostatic VS: Sitting Sitting         Visual Acuity      ED Medications  Medications   ondansetron (ZOFRAN-ODT) dispersible tablet 4 mg (4 mg Oral Given 2/7/23 1139)       Diagnostic Studies  Results Reviewed     Procedure Component Value Units Date/Time    UA w Reflex to Microscopic w Reflex to Culture [[id number]] Collected: 02/07/23 1308    Lab Status: Final result Specimen: Urine, Clean Catch Updated: 02/07/23 1318     Color, UA Yellow     Clarity, UA Clear     Specific [place], UA 1 020     pH, UA 6 5     Leukocytes, UA Negative     Nitrite, UA Negative     Protein, UA Negative mg/dl      Glucose, UA Negative mg/dl      Ketones, UA Negative mg/dl      Urobilinogen, UA 0 2 E U /dl      Bilirubin, UA Negative     Occult Blood, UA Negative     URINE COMMENT --    Urine culture [[id number]] Collected: 02/07/23 1308    Lab Status: In process Specimen: Urine, Clean Catch Updated: 02/07/23 1318                 No orders to display              Procedures  Procedures         ED Course                               SBIRT 20yo+    Flowsheet Row Most Recent Value   SBIRT (25 yo +)    In order to provide better care to our patients, we are screening all of our patients for alcohol and drug use  Would it be okay to ask you these screening questions? Yes Filed at: 02/07/2023 1141   Initial Alcohol Screen: US AUDIT-C     1  How often do you have a drink containing alcohol? 0 Filed at: 02/07/2023 1141   2  How many drinks containing alcohol do you have on a typical day you are drinking? 0 Filed at: 02/07/2023 1141   3a  Male UNDER 65: How often do you have five or more drinks on one occasion? 0 Filed at: 02/07/2023 1141   3b  FEMALE Any Age, or MALE 65+: How often do you have 4 or more drinks on one occassion? 0 Filed at: 02/07/2023 1141   Audit-C Score 0 Filed at: 02/07/2023 1141   NED: How many times in the past year have you    Used an illegal drug or used a prescription medication for non-medical reasons? Never Filed at: 02/07/2023 1141                    Medical Decision Making  Patient is a 76-year-old female coming in for evaluation of nausea and vomiting  Patient is 15 weeks pregnant   Fetal HR is normal  Patient was still feeling slightly nauseated at discharge, but stated that she did not want to continue to try things, and she would prefer to go home and rest as this is most likely to be gastroenteritis  Can ambulate emergency room in no acute distress  Has not vomited while here in the emergency room  Did drink water and eat frito    Gastroenteritis: acute illness or injury  Amount and/or Complexity of Data Reviewed  Labs: ordered  Risk  Prescription drug management  Disposition  Final diagnoses:   Gastroenteritis     Time reflects when diagnosis was documented in both MDM as applicable and the Disposition within this note     Time User Action Codes Description Comment    2/7/2023  1:23 PM Lenon Reasons Add [K52 9] Gastroenteritis       ED Disposition     ED Disposition   Discharge    Condition   Stable    Date/Time   Tue Feb 7, 2023  1:22 PM    [address] [facility] discharge to home/self care                 Follow-up Information     Follow up With Specialties Details Why Contact Info Additional [address] [facility],  Family Medicine   [address] [place] [address] Emergency Department Emergency Medicine  As needed, If symptoms worsen [facility] [id number]  [address] [place] [address] Emergency Department, [address] [place] [address], [facility]          Discharge Medication List as of 2/7/2023  1:23 PM      CONTINUE these medications which have NOT CHANGED    Details   acetaminophen (TYLENOL) 325 mg tablet Take 2 tablets (650 mg total) by mouth every 4 (four) hours as needed for mild pain or headaches, Starting Wed 1/26/2022, No Print      Doxylamine-Pyridoxine (Diclegis) 10-10 MG TBEC Take 2 tablets by mouth daily at bedtime, Starting Mon 12/12/2022, Normal      ibuprofen (MOTRIN) 200 mg tablet Take 3 tablets (600 mg total) by mouth every 6 (six) hours as needed for mild pain or moderate pain, Starting Wed 1/26/2022, No Print      ondansetron (ZOFRAN-ODT) 4 mg disintegrating tablet Take 1 tablet (4 mg total) by mouth every 8 (eight) hours as needed for nausea or vomiting for up to 7 days, Starting Thu 12/29/2022, Until Thu 2/23/2023 at 2359, Normal      Prenatal Vit-Fe Fumarate-FA (PRENATAL VITAMIN PO) Take by mouth, Historical Med      Progesterone 200 MG CAPS Insert 200 mg into the vagina at bedtime Start taking at 16 weeks of pregnancy  Stop taking at 36 weeks of pregnancy  , Starting Mon 1/23/2023, Normal             No discharge procedures on file      PDMP Review     None          ED Provider  Electronically Signed by           Gena Bethea PA-C  02/07/23 4251

## 2023-02-07 NOTE — Clinical Note
Mercedes Riggins was seen and treated in our emergency department on 2/7/2023  No restrictions            Diagnosis:     Kelly    She may return on this date: 02/08/2023         If you have any questions or concerns, please don't hesitate to call        Radha Prado PA-C    ______________________________           _______________          _______________  Hospital Representative                              Date                                Time

## 2023-02-08 LAB — BACTERIA UR CULT: NORMAL

## 2023-02-14 ENCOUNTER — ROUTINE PRENATAL (OUTPATIENT)
Dept: PERINATAL CARE | Facility: OTHER | Age: 28
End: 2023-02-14

## 2023-02-14 VITALS
SYSTOLIC BLOOD PRESSURE: 102 MMHG | HEART RATE: 98 BPM | BODY MASS INDEX: 22.46 KG/M2 | DIASTOLIC BLOOD PRESSURE: 54 MMHG | HEIGHT: 60 IN | WEIGHT: 114.4 LBS

## 2023-02-14 DIAGNOSIS — Z3A.16 16 WEEKS GESTATION OF PREGNANCY: ICD-10-CM

## 2023-02-14 DIAGNOSIS — O09.219 PREVIOUS PRETERM LABOR AFFECTING PREGNANCY, ANTEPARTUM: Primary | ICD-10-CM

## 2023-02-14 NOTE — PROGRESS NOTES
The patient was seen today for an ultrasound  Please see ultrasound report (located under Ob Procedures) for additional details  Thank you very much for allowing us to participate in the care of this very nice patient  Should you have any questions, please do not hesitate to contact me  Fuentes Andre MD [address]  Attending Physician, Debbie

## 2023-02-21 NOTE — PROGRESS NOTES
Please refer to the [facility] ultrasound report in Ob Procedures for additional information regarding today's visit

## 2023-02-23 ENCOUNTER — ROUTINE PRENATAL (OUTPATIENT)
Dept: PERINATAL CARE | Facility: OTHER | Age: 28
End: 2023-02-23

## 2023-02-23 VITALS
HEIGHT: 60 IN | DIASTOLIC BLOOD PRESSURE: 66 MMHG | HEART RATE: 91 BPM | SYSTOLIC BLOOD PRESSURE: 102 MMHG | WEIGHT: 114.6 LBS | BODY MASS INDEX: 22.5 KG/M2

## 2023-02-23 DIAGNOSIS — Z3A.17 17 WEEKS GESTATION OF PREGNANCY: ICD-10-CM

## 2023-02-23 DIAGNOSIS — O09.892 HISTORY OF PREMATURE DELIVERY, CURRENTLY PREGNANT, SECOND TRIMESTER: Primary | ICD-10-CM

## 2023-02-23 NOTE — LETTER
February 23, 2023     Neisha Mcfarlane MD  [address]  [address] [address]    Patient: Magdalene Acevedo   YOB: 1995   Date of Visit: 2/23/2023       Dear Dr Villegas Thermal: Thank you for referring Robin Oro to me for evaluation  Below are my notes for this consultation  If you have questions, please do not hesitate to call me  I look forward to following your patient along with you           Sincerely,        Francisca Nunez MD        CC: No Recipients  Francisca Nunez MD  2/21/2023  5:49 PM  Sign when Signing Visit  Please refer to the [facility] ultrasound report in Ob Procedures for additional information regarding today's visit

## 2023-03-09 PROBLEM — O09.899 HISTORY OF PRETERM DELIVERY, CURRENTLY PREGNANT: Status: ACTIVE | Noted: 2023-03-09

## 2023-03-10 ENCOUNTER — TELEPHONE (OUTPATIENT)
Dept: PERINATAL CARE | Facility: OTHER | Age: 28
End: 2023-03-10

## 2023-03-10 NOTE — TELEPHONE ENCOUNTER
Left patient a message that her MFM appointment had to be rescheduled  The new time, date and location were provided - 3/15/23  3:00  [facility]  The patient has been instructed to please call us back at [phone] with any questions or concerns

## 2023-03-16 ENCOUNTER — ANESTHESIA (OUTPATIENT)
Dept: PERIOP | Facility: HOSPITAL | Age: 28
End: 2023-03-16

## 2023-03-16 ENCOUNTER — ANESTHESIA EVENT (OUTPATIENT)
Dept: PERIOP | Facility: HOSPITAL | Age: 28
End: 2023-03-16

## 2023-03-16 ENCOUNTER — HOSPITAL ENCOUNTER (OUTPATIENT)
Facility: HOSPITAL | Age: 28
Discharge: HOME | End: 2023-03-17
Attending: OBSTETRICS & GYNECOLOGY | Admitting: OBSTETRICS & GYNECOLOGY

## 2023-03-16 VITALS
DIASTOLIC BLOOD PRESSURE: 44 MMHG | OXYGEN SATURATION: 100 % | HEART RATE: 90 BPM | RESPIRATION RATE: 16 BRPM | HEIGHT: 60 IN | SYSTOLIC BLOOD PRESSURE: 87 MMHG | WEIGHT: 116.84 LBS | TEMPERATURE: 98.2 F | BODY MASS INDEX: 22.94 KG/M2

## 2023-03-16 DIAGNOSIS — N88.3 SHORT CERVIX: Primary | ICD-10-CM

## 2023-03-16 DIAGNOSIS — O34.219 VBAC, DELIVERED: ICD-10-CM

## 2023-03-16 PROBLEM — Z3A.20 20 WEEKS GESTATION OF PREGNANCY: Status: ACTIVE | Noted: 2023-02-14

## 2023-03-16 PROBLEM — O26.879 SHORT CERVIX AFFECTING PREGNANCY: Status: ACTIVE | Noted: 2023-03-16

## 2023-03-16 LAB
ABO GROUP BLD: NORMAL
BASOPHILS # BLD AUTO: 0.03 THOUSANDS/ÂΜL (ref 0–0.1)
BASOPHILS NFR BLD AUTO: 0 % (ref 0–1)
BLD GP AB SCN SERPL QL: NEGATIVE
EOSINOPHIL # BLD AUTO: 0.06 THOUSAND/ÂΜL (ref 0–0.61)
EOSINOPHIL NFR BLD AUTO: 1 % (ref 0–6)
ERYTHROCYTE [DISTWIDTH] IN BLOOD BY AUTOMATED COUNT: 13.9 % (ref 11.6–15.1)
HCT VFR BLD AUTO: 32.8 % (ref 34.8–46.1)
HGB BLD-MCNC: 11.1 G/DL (ref 11.5–15.4)
IMM GRANULOCYTES # BLD AUTO: 0.03 THOUSAND/UL (ref 0–0.2)
IMM GRANULOCYTES NFR BLD AUTO: 0 % (ref 0–2)
LYMPHOCYTES # BLD AUTO: 2.17 THOUSANDS/ÂΜL (ref 0.6–4.47)
LYMPHOCYTES NFR BLD AUTO: 21 % (ref 14–44)
MCH RBC QN AUTO: 32 PG (ref 26.8–34.3)
MCHC RBC AUTO-ENTMCNC: 33.8 G/DL (ref 31.4–37.4)
MCV RBC AUTO: 95 FL (ref 82–98)
MONOCYTES # BLD AUTO: 0.68 THOUSAND/ÂΜL (ref 0.17–1.22)
MONOCYTES NFR BLD AUTO: 7 % (ref 4–12)
NEUTROPHILS # BLD AUTO: 7.45 THOUSANDS/ÂΜL (ref 1.85–7.62)
NEUTS SEG NFR BLD AUTO: 71 % (ref 43–75)
NRBC BLD AUTO-RTO: 0 /100 WBCS
PLATELET # BLD AUTO: 190 THOUSANDS/UL (ref 149–390)
PMV BLD AUTO: 11.4 FL (ref 8.9–12.7)
RBC # BLD AUTO: 3.47 MILLION/UL (ref 3.81–5.12)
RH BLD: POSITIVE
SPECIMEN EXPIRATION DATE: NORMAL
WBC # BLD AUTO: 10.42 THOUSAND/UL (ref 4.31–10.16)

## 2023-03-16 RX ORDER — SODIUM CHLORIDE, SODIUM LACTATE, POTASSIUM CHLORIDE, CALCIUM CHLORIDE 600; 310; 30; 20 MG/100ML; MG/100ML; MG/100ML; MG/100ML
INJECTION, SOLUTION INTRAVENOUS CONTINUOUS PRN
Status: DISCONTINUED | OUTPATIENT
Start: 2023-03-16 | End: 2023-03-16

## 2023-03-16 RX ORDER — MAGNESIUM HYDROXIDE 1200 MG/15ML
LIQUID ORAL AS NEEDED
Status: DISCONTINUED | OUTPATIENT
Start: 2023-03-16 | End: 2023-03-16 | Stop reason: HOSPADM

## 2023-03-16 RX ORDER — BUPIVACAINE HYDROCHLORIDE 7.5 MG/ML
INJECTION, SOLUTION INTRASPINAL AS NEEDED
Status: DISCONTINUED | OUTPATIENT
Start: 2023-03-16 | End: 2023-03-16

## 2023-03-16 RX ADMIN — SODIUM CHLORIDE, SODIUM LACTATE, POTASSIUM CHLORIDE, AND CALCIUM CHLORIDE: .6; .31; .03; .02 INJECTION, SOLUTION INTRAVENOUS at 18:36

## 2023-03-16 RX ADMIN — BUPIVACAINE HYDROCHLORIDE IN DEXTROSE 0.8 ML: 7.5 INJECTION, SOLUTION SUBARACHNOID at 18:46

## 2023-03-16 NOTE — PROCEDURES
Angelina Henson    , a [id number] at 20w2d with an AUSTIN of 8/1/2023, by Ultrasound, was seen at [address] [facility] for the following procedure(s): $Procedure Type: US - Transvaginal]                   Ultrasound Other  Cervical Length: 2 4         Ultrasound Probe Disinfection    A transvaginal ultrasound was performed     Prior to use, disinfection was performed with High Level Disinfection Process (Trophon)    Aranza Marrero MD  03/16/23  12:37 PM

## 2023-03-16 NOTE — LETTER
[address] AND DELIVERY  [address] [place] [id number]  Dept: [phone]    March 17, 2023     Patient: Angelika Carpenter   YOB: 1995   Date of Visit: 3/16/2023       To Whom it May Concern:    Sherice Rascon is under my professional care  She was seen in the hospital from 3/16/2023 to 03/17/23  She may return to work on 3/20/2023 without limitations  If you have any questions or concerns, please don't hesitate to call           Sincerely,          Nani Huston MD

## 2023-03-16 NOTE — ANESTHESIA PROCEDURE NOTES
Spinal Block    Patient location during procedure: OR  Start time: 3/16/2023 6:46 PM  Reason for block: procedure for pain and at surgeon's request  Staffing  Performed: CRNA   Preanesthetic Checklist  Completed: patient identified, IV checked, site marked, risks and benefits discussed, surgical consent, monitors and equipment checked, pre-op evaluation and timeout performed  Spinal Block  Patient position: sitting  Prep: Betadine and ChloraPrep  Patient monitoring: cardiac monitor and frequent blood pressure checks  Approach: midline  Location: L3-4  Injection technique: single-shot  Needle  Needle type: pencil-tip   Needle gauge: 25 G  Needle length: 10 cm  Assessment  Sensory level: T4  Injection Assessment:  negative aspiration for heme, no paresthesia on injection and positive aspiration for clear CSF    Post-procedure:  adhesive bandage applied, pressure dressing applied, secured with tape, site cleaned and sterile dressing applied

## 2023-03-16 NOTE — ASSESSMENT & PLAN NOTE
-Patient presenting with  shortening of cervix from preview of 3cm to current 2 4cm at shortest  -Patient opted for cervical cerclage patient for prevention of further shortening  -Encouraged taking vaginal progesterone

## 2023-03-16 NOTE — OP NOTE
OPERATIVE REPORT  PATIENT NAME: Gayla Cedeño    :  1995  MRN: [id number]  Pt Location: [facility] OR ROOM 02    SURGERY DATE: 3/16/2023    Surgeon(s) and Role:     * Teo Paige MD - Primary    Preop Diagnosis:  Short cervix [N88 3]    Post-Op Diagnosis Codes: * Short cervix [N88 3]    Procedure(s):  CERCLAGE CERVICAL    Specimen(s):  * No specimens in log *    Estimated Blood Loss:   Minimal    Drains:  * No LDAs found *    Anesthesia Type:   Spinal    Operative Indications:  Short cervix [N88 3]  History of  deliveries     Operative Findings:  Normal external genitalia  Parous cervix, external cervical os dilated  Complications:   None    Procedure and Technique:  After discussion of risks/benefits/alternatives, the patient gave informed consent for cerclage placement  She was taken to the operating room, where a time-out was performed  Regional anesthesia was administered  She was positioned in dorsal lithotomy position with her feet in yellowfin stirrups  Sequential compression devices were used  A CHG vaginal prep was performed, and she was draped in the usual sterile fashion  The bladder was emptied with a red rubber cather  A weighted speculum was placed in the vagina, and additional retractors were used to visualize the cervix  The cervix was grasped with an Allis clamp  A single pursestring suture was placed circumferentially in the cervical stroma with #5 Ethibond suture, taking care to avoid cervical vessels at 3 and 9 o'clock  The suture was tied at 1 o'clock  Digital exam revealed a closed cervix  The cervix was hemostatic following placement  All instruments were removed, with all counts correct post-procedure  Fetal heart motion was confirmed pre-and post-operatively  She tolerated the procedure well without complications and was taken to PACU in stable condition         Patient Disposition:  PACU       SIGNATURE: Constance Nixon MD  DATE: 2023  TIME: 7:42 PM

## 2023-03-16 NOTE — H&P
Neri 28 y o  female MRN: [id number]  Unit/Bed#: L&D 322-01 Encounter: [id number]    Assessment: 28 y o  [id number] at 20w2d admitted for  shortening of cervix and placement of cervical cerclage  SVE: closed/th/high  NST reactive  Cephalic confirmed by ultrasound  GBS status: unknown       Plan:   Previous  labor affecting pregnancy, antepartum  Assessment & Plan  -Patient has history of   for PPROM,  1LTCS for fetal bradycardia, and term  with IOL for IUGR    20 weeks gestation of pregnancy  Assessment & Plan  -Patient presenting with  shortening of cervix from preview of 3cm to current 2 4cm at shortest  -Patient opted for cervical cerclage patient for prevention of further shortening  -Encouraged taking vaginal progesterone      Discussed case and plan w/ Dr Kristina Bose      Chief Complaint: vaginal bleeding yesterday, left-sided leg pain and abdominal pain    HPI: Enrique Matute is a 28 y o  [id number] with an AUSTIN of 2023, by Ultrasound at 20w2d who is being admitted for cervical cerclage placement for cervical shortening  Patient states yesterday she had vaginal spotting at [facility] that stopped on its own with no further bleeding  It was not precipitated by sex or objects in the vagina  She then began to experience left sided hip pain and abdominal pain  She states that with previous  delivery she was not offered cervical cerclage  She stopped taking vaginal progesterone during this pregnancy after 2 days because she did not like her vaginal discharge  Denies loss of fluid  Has not felt her baby move yet during this pregnancy       Patient Active Problem List   Diagnosis   • Ex-smoker   • History of  delivery, antepartum   • COVID-19 vaccination declined   • Abdominal pain during pregnancy in third trimester   • , delivered   • IUGR (intrauterine growth restriction) in prior pregnancy, pregnant   • 20 weeks gestation of pregnancy   • Previous  labor affecting pregnancy, antepartum   • History of  delivery, currently pregnant       Baby complications/comments: shortening of cervix    Review of Systems   Constitutional: Negative for chills and fever  Respiratory: Negative for cough and shortness of breath  Cardiovascular: Negative for chest pain and leg swelling  Gastrointestinal: Negative for abdominal pain, nausea and vomiting  Genitourinary: Negative for dysuria, pelvic pain, urgency, vaginal bleeding and vaginal discharge  Neurological: Negative for dizziness, light-headedness and headaches  All other systems reviewed and are negative  OB Hx:  OB History    Para Term  AB Living   8 3 1 2 4 3   SAB IAB Ectopic Multiple Live Births   2 2     3      # Outcome Date GA Lbr Mo/2nd Weight Sex Delivery Anes PTL Lv   8 Current            7 Term 22 38w2d / 00:27 2545 g (5 lb 9 8 oz) M Vag-Spont EPI N OLLIE   6 SAB      SAB      5 SAB 19 8w0d    SAB  N       Birth Comments: had D and C   4 IAB 2018           3  17 33w0d  2080 g (4 lb 9 4 oz) M CS-LTranv Spinal Y OLLIE      Birth Comments: Fetal bradycardia per [facility] chart (pre noted "Had anxiety attack affecting fetal status, had   ")      Complications: Fetal Intolerance   2 IAB 06/01/15 6w0d          1  14 34w3d  2080 g (4 lb 9 4 oz) F Vag-Spont EPI Y OLLIE      Birth Comments: Prolonged PPROM ( admitted 1/3) [email or web address], NICU x 12d, feeding/weight gain      Complications: Premature rupture of membranes during pregnancy, delivered, Rupture of membranes with delay of delivery, Small for gestational age infant      Obstetric Comments   Menarche 8   Irregular menses       Past Medical Hx:  Past Medical History:   Diagnosis Date   • Anemia    • Former smoker     Stop smoking 2020   • Miscarriage     x 2   • Varicella     vaccine       Past Surgical hx:  Past Surgical History:   Procedure Laterality Date   •  SECTION  2017    Patient states anxiety attack which affected fetal tracing necessitating   • DILATION AND EVACUATION  2016    Elective termination   • DILATION AND EVACUATION  2018    Persistent Missed pregnancy   • [place] TX MISSED  FIRST TRIMESTER SURGICAL N/A 2020    Procedure: DILATATION AND EVACUATION (D&E) ( 8 OF WEEKS); Surgeon: Chey Mendez MD;  Location: [facility] OR;  Service: Gynecology   • WISDOM TOOTH EXTRACTION         Social Hx:  Alcohol use: no  Tobacco use: no  Other substance use: no    Allergies   Allergen Reactions   • Penicillins Hives       Medications Prior to Admission   Medication   • Prenatal Vit-Fe Fumarate-FA (PRENATAL VITAMIN PO)   • acetaminophen (TYLENOL) 325 mg tablet   • Doxylamine-Pyridoxine (Diclegis) 10-10 MG TBEC   • ibuprofen (MOTRIN) 200 mg tablet   • ondansetron (ZOFRAN-ODT) 4 mg disintegrating tablet   • Progesterone 200 MG CAPS       Objective:  Temp:  [98 °F (36 7 °C)-98 7 °F (37 1 °C)] 98 7 °F (37 1 °C)  HR:  [77-80] 77  Resp:  [17] 17  BP: (121-123)/(54-61) 121/54  Body mass index is 23 16 kg/m²  Physical Exam:  Physical Exam  Constitutional:       Appearance: Normal appearance  Cardiovascular:      Rate and Rhythm: Normal rate and regular rhythm  Heart sounds: No murmur heard  No friction rub  No gallop  Pulmonary:      Effort: Pulmonary effort is normal  No respiratory distress  Breath sounds: No wheezing  Abdominal:      Palpations: Abdomen is soft  Tenderness: There is no abdominal tenderness  Musculoskeletal:         General: No swelling or tenderness  Neurological:      Mental Status: She is alert and oriented to person, place, and time  Skin:     General: Skin is warm and dry  Psychiatric:         Mood and Affect: Mood normal    Vitals reviewed        TVUS: 2 4cm CL  Speculum exam: absence of bleeding, external os slightly open  SVE: cl/th/high      FHT:     NST baseline 130, moderate variability, 10x10 accels present, no variability  TOCO:    no decelerations    Lab Results   Component Value Date    WBC 9 96 12/29/2022    HGB 12 5 12/29/2022    HCT 38 8 12/29/2022     12/29/2022     Lab Results   Component Value Date    K 3 5 12/12/2022     12/12/2022    CO2 25 12/12/2022    BUN 8 12/12/2022    CREATININE 0 61 12/12/2022    AST 19 12/12/2022    ALT 11 (L) 12/12/2022     Prenatal Labs: Reviewed      Blood type: B Pos  Antibody: Neg  GBS: Unknown  HIV: Neg  Rubella: Nml  Syphilis IgM/IgG: Neg  HBsAg: Neg  HCAb: Not completed  Chlamydia: Neg  Gonorrhea: Neg  >2 Midnights  INPATIENT     Signature/Title:  Army Yessi MD  Date: 3/16/2023  Time: 12:34 PM

## 2023-03-16 NOTE — ANESTHESIA PREPROCEDURE EVALUATION
Procedure:  CERCLAGE CERVICAL (Cervix)    Relevant Problems   ANESTHESIA (within normal limits)      CARDIO (within normal limits)      ENDO (within normal limits)      GI/HEPATIC (within normal limits)      /RENAL (within normal limits)      GYN   (+) 20 weeks gestation of pregnancy      HEMATOLOGY (within normal limits)      MUSCULOSKELETAL (within normal limits)      NEURO/PSYCH   (+) History of  delivery, currently pregnant   (+) Previous  labor affecting pregnancy, antepartum      PULMONARY (within normal limits)        Physical Exam    Airway    Mallampati score: I  TM Distance: >3 FB  Neck ROM: full     Dental   No notable dental hx     Cardiovascular  Rhythm: regular, Rate: normal, Cardiovascular exam normal    Pulmonary  Pulmonary exam normal Breath sounds clear to auscultation,     Other Findings        Anesthesia Plan  ASA Score- 2     Anesthesia Type- spinal with ASA Monitors  Additional Monitors:   Airway Plan:           Plan Factors-Exercise tolerance (METS): >4 METS  Chart reviewed  Existing labs reviewed  Patient summary reviewed  Patient is not a current smoker  Induction-     Postoperative Plan- Plan for postoperative opioid use  Informed Consent- Anesthetic plan and risks discussed with patient

## 2023-03-17 RX ORDER — ACETAMINOPHEN 325 MG/1
650 TABLET ORAL EVERY 6 HOURS PRN
Refills: 0
Start: 2023-03-17

## 2023-03-17 RX ADMIN — SODIUM CHLORIDE, SODIUM LACTATE, POTASSIUM CHLORIDE, AND CALCIUM CHLORIDE 500 ML: .6; .31; .03; .02 INJECTION, SOLUTION INTRAVENOUS at 00:14

## 2023-03-17 NOTE — ANESTHESIA POSTPROCEDURE EVALUATION
Post-Op Assessment Note    CV Status:  Stable    Pain management: adequate     Mental Status:  Alert and awake   Hydration Status:  Euvolemic   PONV Controlled:  Controlled   Airway Patency:  Patent      Post Op Vitals Reviewed: Yes      Staff: CRNA, Anesthesiologist         No notable events documented      BP      Temp      Pulse     Resp      SpO2      /51   Pulse 91   Temp 98 °F (36 7 °C) (Oral)   Resp 16   Ht 4' 11 5" (1 511 m)   Wt 53 kg (116 lb 13 5 oz)   LMP 10/15/2022   SpO2 100%   BMI 23 20 kg/m²

## 2023-03-17 NOTE — PROGRESS NOTES
Patient requesting discharge s/p cerclage  Fetal heart tones hourly between 145 and 150 bpm   [place] without contractions  She feels well and has voided and ambulated and requests discharge home  Vitals stable and normal   Physical exam wnl  Discussed with Dr Sharon Read MD  OBGYN Resident  03/17/23  12:21 AM

## 2023-03-20 ENCOUNTER — TELEPHONE (OUTPATIENT)
Dept: OBGYN CLINIC | Facility: CLINIC | Age: 28
End: 2023-03-20

## 2023-03-20 ENCOUNTER — HOSPITAL ENCOUNTER (OUTPATIENT)
Facility: HOSPITAL | Age: 28
Discharge: HOME | End: 2023-03-20
Attending: OBSTETRICS & GYNECOLOGY | Admitting: OBSTETRICS & GYNECOLOGY

## 2023-03-20 VITALS
OXYGEN SATURATION: 100 % | SYSTOLIC BLOOD PRESSURE: 127 MMHG | DIASTOLIC BLOOD PRESSURE: 51 MMHG | BODY MASS INDEX: 22.94 KG/M2 | HEIGHT: 60 IN | RESPIRATION RATE: 16 BRPM | HEART RATE: 96 BPM | WEIGHT: 116.84 LBS | TEMPERATURE: 98.8 F

## 2023-03-20 LAB
BACTERIA UR QL AUTO: ABNORMAL /HPF
BILIRUB UR QL STRIP: NEGATIVE
CLARITY UR: CLEAR
COLOR UR: YELLOW
GLUCOSE UR STRIP-MCNC: NEGATIVE MG/DL
HGB UR QL STRIP.AUTO: NEGATIVE
KETONES UR STRIP-MCNC: NEGATIVE MG/DL
LEUKOCYTE ESTERASE UR QL STRIP: NEGATIVE
MUCOUS THREADS UR QL AUTO: ABNORMAL
NITRITE UR QL STRIP: NEGATIVE
NON-SQ EPI CELLS URNS QL MICRO: ABNORMAL /HPF
PH UR STRIP.AUTO: 6 [PH]
PROT UR STRIP-MCNC: NEGATIVE MG/DL
RBC #/AREA URNS AUTO: ABNORMAL /HPF
SP GR UR STRIP.AUTO: >=1.03 (ref 1–1.03)
UROBILINOGEN UR QL STRIP.AUTO: 0.2 E.U./DL
WBC #/AREA URNS AUTO: ABNORMAL /HPF

## 2023-03-20 NOTE — PROGRESS NOTES
Triage Note - OB  Vidhi Garber 28 y o  female MRN: [id number]  Unit/Bed#: L&D  Encounter: [id number]    OB TRIAGE NOTE  Vidhi Garber  [id number]  3/20/2023  3:39 PM  L&D 321/L&D     ASSESS:  28 y o  [id number] 20w6d with a recent ultrasound indicated cerclage placed on 3/16/23, presents with sudden worsening vaginal pressure  Speculum exam revealed a normal appearing cervix with cerclage appropriately in place and not on tension  Gentle digital exam revealed a slightly open external os and closed internal os, exam very similar to post-procedure  No contractions on toco and UA negative  We discussed removal of cerclage now versus seeing how the next few days go  Patient agrees with taking off of work for the next 3 days to see how she feels prior to considering removal      PLAN  · Patient recommended to stay out of work the next few days and make a follow up appt with Dr Bel Ramos this week  · Call and present to L&D if pain is steadily worsening and may consider removing cerclage     Discharge instructions  · Patient instructed to call if experiencing worsening pain, contractions, vaginal bleeding, loss of fluid, or decreased fetal movement  · Will follow up with OBGYN office this week    D/w Dr Negrete Rad  ______________    SUBJECTIVE    [place]: Estimated Date of Delivery: 23    HPI Chronology:  28 y o  [id number] 20w6d presents with complaint of increased vaginal pressure with cerclage in place  She was seen on 3/16/23 in triage for abdominal discomfort and was found to have a shortened cervix of 2 49 cm  She has a history of spontaneous  birth at 26w3d and 33w0d and thus a cerclage was offered and placed  Patient states that Friday-Saturday she had a lot of cramping, however it slowly improved   she felt better and was able to get back to normal activities  This morning she also felt fine and returned to work   While at work, she had sudden onset of intense vaginal pressure that caused her to double over  She has had mild spotting but no heavy vaginal bleeding  Denies any leakage of fluid  Aside from the cramping, she has not had any contractions  She is voiding without any issue and having regular bowel movements without any constipation  Vitals:   /51 (BP Location: Right arm)   Pulse 96   Temp 98 8 °F (37 1 °C) (Oral)   Resp 16   Ht 4' 11 5" (1 511 m)   Wt 53 kg (116 lb 13 5 oz)   LMP 10/15/2022   SpO2 100%   BMI 23 20 kg/m²   Body mass index is 23 2 kg/m²  Physical Exam  Constitutional:       General: She is not in acute distress  Appearance: She is not ill-appearing or toxic-appearing  HENT:      Head: Normocephalic and atraumatic  Pulmonary:      Effort: Pulmonary effort is normal  No respiratory distress  Breath sounds: No stridor  Abdominal:      Palpations: Abdomen is soft  Tenderness: There is no abdominal tenderness  There is no guarding  Comments: Gravid   Genitourinary:     General: Normal vulva  Vagina: No vaginal discharge  Comments: Speculum exam revealed a normal appearing cervix, small amount of physiologic white discharge, cerclage appropriately in place with the knot at the 1 o'clock position  The cerclage is not on any tension  Gentle cervical exam reveals cervical os about fingertip dilated and internal os closed; cerclage does not feel on tension with palpation   Skin:     General: Skin is warm and dry  Neurological:      General: No focal deficit present  Mental Status: She is alert and oriented to person, place, and time  Mental status is at baseline  Psychiatric:         Mood and Affect: Mood normal          Thought Content:  Thought content normal          FHT:     BPM on doppler    TOCO:    No contractions on toco     Labs:   Recent Results (from the past 24 hour(s))   Urinalysis with microscopic    Collection Time: 03/20/23  2:24 PM   Result Value Ref Range    Color, UA Yellow     Clarity, UA Clear     Specific Gravity, UA >=1 030 1 003 - 1 030    pH, UA 6 0 4 5, 5 0, 5 5, 6 0, 6 5, 7 0, 7 5, 8 0    Leukocytes, UA Negative Negative    Nitrite, UA Negative Negative    Protein, UA Negative Negative mg/dl    Glucose, UA Negative Negative mg/dl    Ketones, UA Negative Negative mg/dl    Urobilinogen, UA 0 2 0 2, 1 0 E U /dl E U /dl    Bilirubin, UA Negative Negative    Occult Blood, UA Negative Negative    RBC, UA 0-1 (A) None Seen, 2-4 /hpf    WBC, UA 2-4 None Seen, 2-4, 5-60 /hpf    Epithelial Cells Moderate (A) None Seen, Occasional /hpf    Bacteria, UA Occasional None Seen, Occasional /hpf    MUCUS THREADS Moderate (A) None Seen       Lab, Imaging and other studies: I have personally reviewed pertinent reports          Olivia Davis MD  3/20/2023  3:39 PM

## 2023-03-20 NOTE — TELEPHONE ENCOUNTER
I called the patient back    She reports that today she has intense pressure in the vagina and some spotting   She denies any constipation and reports she had a normal bowel movement this am      Pt to go to L&D for evaluation  Staff notified and Dr Marsha Jovel Notified as well

## 2023-03-20 NOTE — LETTER
[address] AND DELIVERY  [address] [place] [address]  Dept: [phone]    March 20, 2023     Patient: Oc Uriarte   YOB: 1995   Date of Visit: 3/20/2023       To Whom it May Concern:    Nancie Urban is under my professional care  She was seen in the hospital on 3/20/2023  Due to her current medical condition related to pregnancy, she should be out of work for the next 3 days through 3/23/23  She may return to work on Friday, 3/24/23  If you have any questions or concerns, please don't hesitate to call           Sincerely,       Caryle Hailey, MD

## 2023-03-27 ENCOUNTER — TELEPHONE (OUTPATIENT)
Dept: PERINATAL CARE | Facility: CLINIC | Age: 28
End: 2023-03-27

## 2023-03-27 NOTE — TELEPHONE ENCOUNTER
Spoke with patient and confirmed her tv ultrasound MFM appointment had to be rescheduled, and changed to anatomy scan for 3/29, [facility] office @ 3 PM   Patient verbalized understanding of new time, date and location of appointment  Patient denies further questions

## 2023-04-13 PROBLEM — Z3A.24 24 WEEKS GESTATION OF PREGNANCY: Status: ACTIVE | Noted: 2023-02-14

## 2023-04-21 PROBLEM — O35.BXX0 FETAL VENTRICULAR SEPTAL DEFECT AFFECTING ANTEPARTUM CARE OF MOTHER: Status: ACTIVE | Noted: 2023-04-21

## 2023-05-08 ENCOUNTER — TELEPHONE (OUTPATIENT)
Dept: OBGYN CLINIC | Facility: CLINIC | Age: 28
End: 2023-05-08

## 2023-05-08 NOTE — TELEPHONE ENCOUNTER
Pt calling in with complaints of leaking fluid  TT Dr Charlette Barrios    i have Zaki Bhatt on the phone  she is 27w 6d, [place], stating she is leaking fluid whenever she sneezes or coughs  she said it is a thin clear to light yellowish discharge and it does not smell like urine  1:28 PM  20 days left  Read  • Can send to l&d for eval  [place] is fine, sounds like urine or discharge if provokable, but needs eval to be sure  1:29 PM  20 days left      Pt made aware and verbalized understanding  Will head over to L&D for evaluation

## 2023-05-16 ENCOUNTER — ROUTINE PRENATAL (OUTPATIENT)
Dept: PEDIATRIC CARDIOLOGY | Facility: CLINIC | Age: 28
End: 2023-05-16

## 2023-05-16 DIAGNOSIS — Z36.89 NORMAL FETAL CARDIAC EXAM: Primary | ICD-10-CM

## 2023-05-16 NOTE — PROGRESS NOTES
Fetal Cardiology Consult    Date of Visit:    2023  Gestational Age:  29w0d   Estimated Date of Delivery:    23     Reason for consultation: concern for VSD    Fetal Echocardiogram demonstrated normal cardiac anatomy and function  I reviewed the normal findings  We also discussed, that due to the technical limitations of fetal echocardiography and the nature of fetal circulation, a number of cardiovascular defects cannot be definitively ruled out at this stage  Examples include but are not limited to: ASD, PDA, small VSD, coarctation of the aorta, partial anomalous pulmonary venous connection  Please see full echocardiogram report under OB procedures  Assessment and Recommendations:  -Normal fetal cardiac anatomy and function    -Normal  care and prenatal care are recommended     -There is no follow-up needed  I spent 60 minutes - on the day of service - reviewing the patient's chart, reviewing previous imaging studies, counseling the patient about the fetal findings, coordinating care, and documenting care  Lorie Calderón MD  Pediatric Cardiology  [address] [place] [address]  Fax: [phone]  [email or web address]

## 2023-05-25 PROBLEM — O35.BXX0 FETAL VENTRICULAR SEPTAL DEFECT AFFECTING ANTEPARTUM CARE OF MOTHER: Status: RESOLVED | Noted: 2023-04-21 | Resolved: 2023-05-25

## 2023-06-07 ENCOUNTER — ROUTINE PRENATAL (OUTPATIENT)
Dept: OBGYN CLINIC | Facility: CLINIC | Age: 28
End: 2023-06-07
Payer: COMMERCIAL

## 2023-06-07 VITALS
HEIGHT: 59 IN | WEIGHT: 121.8 LBS | DIASTOLIC BLOOD PRESSURE: 60 MMHG | SYSTOLIC BLOOD PRESSURE: 100 MMHG | HEART RATE: 92 BPM | BODY MASS INDEX: 24.56 KG/M2

## 2023-06-07 DIAGNOSIS — O34.219 HISTORY OF CESAREAN DELIVERY, ANTEPARTUM: ICD-10-CM

## 2023-06-07 DIAGNOSIS — Z3A.32 32 WEEKS GESTATION OF PREGNANCY: ICD-10-CM

## 2023-06-07 DIAGNOSIS — Z23 NEED FOR TDAP VACCINATION: ICD-10-CM

## 2023-06-07 DIAGNOSIS — O09.899 HISTORY OF PRETERM DELIVERY, CURRENTLY PREGNANT: ICD-10-CM

## 2023-06-07 DIAGNOSIS — O09.299 IUGR (INTRAUTERINE GROWTH RESTRICTION) IN PRIOR PREGNANCY, PREGNANT: ICD-10-CM

## 2023-06-07 DIAGNOSIS — O26.879 SHORT CERVIX AFFECTING PREGNANCY: ICD-10-CM

## 2023-06-07 DIAGNOSIS — O09.33 INSUFFICIENT PRENATAL CARE IN THIRD TRIMESTER: Primary | ICD-10-CM

## 2023-06-07 DIAGNOSIS — O09.219 PREVIOUS PRETERM LABOR AFFECTING PREGNANCY, ANTEPARTUM: ICD-10-CM

## 2023-06-07 PROCEDURE — 90471 IMMUNIZATION ADMIN: CPT | Performed by: OBSTETRICS & GYNECOLOGY

## 2023-06-07 PROCEDURE — 99213 OFFICE O/P EST LOW 20 MIN: CPT | Performed by: OBSTETRICS & GYNECOLOGY

## 2023-06-07 PROCEDURE — 90715 TDAP VACCINE 7 YRS/> IM: CPT | Performed by: OBSTETRICS & GYNECOLOGY

## 2023-06-07 NOTE — PROGRESS NOTES
Pt is a 28 y o  [id number] 32w1d  Pregnancy is complicated by insufficient prenatal care, short cervix s/p cerclage placement, h o PTD, h o IUGR, VSD, h o C/S with h o successful , desires TOLAC  Pt last seen at 9 weeks gestation in our office--has only been to MFM or Triage  Reviewed the importance of regular prenatal care, especially because she is a high risk patient  She reports that her job has been making it very difficult for her to come,but she will try to come to her visits  Pt given 1 hour gtt as she already had CBC and RPR when she was at the hospital  Pt reports +FM  Denies vb  She reports two small gushes of thin fluid yesterday and again today at 11:30  She deniesctx  SSE: pooling negative, nitrazine negative, ferning negative  Cerclage in place and cervix visually closed  SVE: closed/50/-3, soft, midposition   PTL precautions and fkc reviewed  Pt advised she did not break her water, but she has a thin discharge likely from her cerclage   Reassurance provided  TDAP administered  3d trimester folder given and reviewed  Delivery consent reviewed and signed  F/U 2 weeks  Remove cerclage at 36 weeks

## 2023-06-19 ENCOUNTER — HOSPITAL ENCOUNTER (OUTPATIENT)
Facility: HOSPITAL | Age: 28
Discharge: HOME | End: 2023-06-19
Attending: OBSTETRICS & GYNECOLOGY | Admitting: OBSTETRICS & GYNECOLOGY
Payer: COMMERCIAL

## 2023-06-19 VITALS
TEMPERATURE: 98.1 F | WEIGHT: 124.12 LBS | BODY MASS INDEX: 25.07 KG/M2 | OXYGEN SATURATION: 100 % | DIASTOLIC BLOOD PRESSURE: 70 MMHG | HEART RATE: 82 BPM | RESPIRATION RATE: 18 BRPM | SYSTOLIC BLOOD PRESSURE: 92 MMHG

## 2023-06-19 PROBLEM — O26.873 SHORT CERVIX WITH CERVICAL CERCLAGE IN THIRD TRIMESTER, ANTEPARTUM: Status: ACTIVE | Noted: 2023-03-16

## 2023-06-19 PROBLEM — O34.33 SHORT CERVIX WITH CERVICAL CERCLAGE IN THIRD TRIMESTER, ANTEPARTUM: Status: ACTIVE | Noted: 2023-03-16

## 2023-06-19 PROBLEM — Z3A.33 33 WEEKS GESTATION OF PREGNANCY: Status: ACTIVE | Noted: 2023-02-14

## 2023-06-19 LAB
BACTERIA UR QL AUTO: ABNORMAL /HPF
BILIRUB UR QL STRIP: NEGATIVE
CLARITY UR: CLEAR
COLOR UR: YELLOW
FIBRONECTIN FETAL VAG QL: POSITIVE
GLUCOSE UR STRIP-MCNC: NEGATIVE MG/DL
HGB UR QL STRIP.AUTO: NEGATIVE
KETONES UR STRIP-MCNC: NEGATIVE MG/DL
LEUKOCYTE ESTERASE UR QL STRIP: ABNORMAL
MUCOUS THREADS UR QL AUTO: ABNORMAL
NITRITE UR QL STRIP: NEGATIVE
NON-SQ EPI CELLS URNS QL MICRO: ABNORMAL /HPF
OTHER STN SPEC: ABNORMAL
PH UR STRIP.AUTO: 6.5 [PH]
PROT UR STRIP-MCNC: ABNORMAL MG/DL
RBC #/AREA URNS AUTO: ABNORMAL /HPF
SP GR UR STRIP.AUTO: 1.02 (ref 1–1.03)
UROBILINOGEN UR STRIP-ACNC: <2 MG/DL
WBC #/AREA URNS AUTO: ABNORMAL /HPF

## 2023-06-19 PROCEDURE — 82731 ASSAY OF FETAL FIBRONECTIN: CPT

## 2023-06-19 PROCEDURE — 99214 OFFICE O/P EST MOD 30 MIN: CPT

## 2023-06-19 PROCEDURE — NC001 PR NO CHARGE: Performed by: OBSTETRICS & GYNECOLOGY

## 2023-06-19 PROCEDURE — 81001 URINALYSIS AUTO W/SCOPE: CPT

## 2023-06-19 RX ORDER — BETAMETHASONE SODIUM PHOSPHATE AND BETAMETHASONE ACETATE 3; 3 MG/ML; MG/ML
12 INJECTION, SUSPENSION INTRA-ARTICULAR; INTRALESIONAL; INTRAMUSCULAR; SOFT TISSUE ONCE
Status: COMPLETED | OUTPATIENT
Start: 2023-06-19 | End: 2023-06-19

## 2023-06-19 RX ADMIN — BETAMETHASONE SODIUM PHOSPHATE AND BETAMETHASONE ACETATE 12 MG: 3; 3 INJECTION, SUSPENSION INTRA-ARTICULAR; INTRALESIONAL; INTRAMUSCULAR at 10:31

## 2023-06-19 NOTE — PROGRESS NOTES
L&D Triage Note - OB/GYN  Shailesh Khoury 28 y o  female MRN: [id number]  Unit/Bed#: L&D 326-01 Encounter: [id number]      ASSESSMENT:    Shailesh Khoury is a 28 y o  [id number] at 33w6d who presents for assessment of increased vaginal pressure and cramping   labor work-up significant for positive FFN  Status post first dose of betamethasone for fetal lung maturity  Stable for discharge with plan to follow-up tomorrow for second dose  PLAN:    1) R/o PTL  - Cerclage not under tension under speculum exam  - FFN positive  - SVE /-3, 2h recheck unchanged  - NST reactive  - Tocometer with irregular ctx, >10m apart  - Reviewed risk and benefits benefits of steroid administration for fetal lung maturity in the setting of prematurity, risks including maternal acute hyperglycemia, psychosis, and mixed studies for fetal neurocognitive developmental delay  - Patient expressed understanding and agreeable to treatment plan  - Reviewed pelvic rest with cerclage in place to decrease risk of PTD  - Encouraged to complete 1h gct    2) Discharge from [facility] triage with pre-term labor precautions    - Reviewed rupture of membranes, false vs true labor, decreased fetal movement, and vaginal bleeding   - Pt to call provider with any concerns and follow up at her next scheduled prenatal appointment    - Continue routine prenatal care   - Case discussed with Dr Lynda Cronin:    Shailesh Khoury 28 y o  [id number] at [facility] with an Estimated Date of Delivery: 23 who presents from home with abdominal cramping and vaginal pressure  Patient reports that she was recently sexually active on Friday and woke up this morning with increased vaginal pressure  Given her history of  deliveries, she presented to triage for further assessment  Denies other obstetrical complaints  Denies dysuria urinary complaints but urine collection does show particulate  Denies other associated symptoms      Her current obstetrical history is significant for shortened cervix with cervical cerclage, IUGR, insufficient prenatal care    Her past obstetrical history is significant for 2x SAB, 2x IAB, 2x PTD with 1x c/s, 1x     Contractions: irregular  Leakage of fluid: no  Vaginal Bleeding: no  Fetal movement: present    OBJECTIVE:    Vitals:    23 0717   BP: 92/70   Pulse:    Resp:    Temp: 98 1 °F (36 7 °C)   SpO2:        ROS:  Constitutional: Negative  Respiratory: Negative  Cardiovascular: Negative    Gastrointestinal: Negative    General Physical Exam:  General: in no apparent distress and well developed and well nourished  Cardiovascular: intact distals pulses, Cor RRR  Lungs: non-labored breathing, CTA  Abdomen: abdomen is soft without significant tenderness, masses, organomegaly or guarding  Lower extremeties: nontender, b/l Rex's sign negative    Cervical Exam  Speculum: Cervical cerclage suture appreciated, not under tension  SVE: 1 / 50% / -3    Fetal monitoring:  FHT:  130 bpm/ Moderate 6 - 25 bpm / 15 x 15 accelerations present, no decelerations  [place]: contractions irregular     KOH/WTMT:     Infection:   - neg clue cells    - neg hyphae   - neg trichomonads present    Membrane status   - neg ferning   - neg nitrazine   - neg pooling     Urine Dip    - negative for acute infection          Angela Anne MD  OBGYN PGY-2  2023 8:30 AM

## 2023-06-19 NOTE — LETTER
[address] AND DELIVERY  [address] [place] [address]  Dept: [phone]    June 19, 2023     Patient: Gopal Yañez   YOB: 1995   Date of Visit: 6/19/2023       To Whom it May Concern:    Jose Alejandro Rock is under my professional care  She was seen in the hospital from 6/19/2023 to 06/19/23  She will be requiring a second visit tomorrow on 6/20/2023  She may return to work on 6/21/2023  If you have any questions or concerns, please don't hesitate to call           Sincerely,          Marinus Olszewski, MD

## 2023-06-20 ENCOUNTER — OFFICE VISIT (OUTPATIENT)
Age: 28
End: 2023-06-20
Payer: COMMERCIAL

## 2023-06-20 DIAGNOSIS — O26.879 SHORT CERVIX AFFECTING PREGNANCY: Primary | ICD-10-CM

## 2023-06-20 PROCEDURE — NC001 PR NO CHARGE

## 2023-06-20 RX ORDER — BETAMETHASONE SODIUM PHOSPHATE AND BETAMETHASONE ACETATE 3; 3 MG/ML; MG/ML
12 INJECTION, SUSPENSION INTRA-ARTICULAR; INTRALESIONAL; INTRAMUSCULAR; SOFT TISSUE ONCE
Status: DISCONTINUED | OUTPATIENT
Start: 2023-06-20 | End: 2023-06-20

## 2023-06-20 RX ADMIN — BETAMETHASONE SODIUM PHOSPHATE AND BETAMETHASONE ACETATE 12 MG: 3; 3 INJECTION, SUSPENSION INTRA-ARTICULAR; INTRALESIONAL; INTRAMUSCULAR at 11:05

## 2023-06-21 ENCOUNTER — HOSPITAL ENCOUNTER (OUTPATIENT)
Facility: HOSPITAL | Age: 28
Discharge: HOME | End: 2023-06-21
Attending: OBSTETRICS & GYNECOLOGY | Admitting: OBSTETRICS & GYNECOLOGY
Payer: COMMERCIAL

## 2023-06-21 ENCOUNTER — TELEPHONE (OUTPATIENT)
Dept: OBGYN CLINIC | Facility: CLINIC | Age: 28
End: 2023-06-21

## 2023-06-21 VITALS
RESPIRATION RATE: 18 BRPM | HEART RATE: 107 BPM | WEIGHT: 124.12 LBS | TEMPERATURE: 98.1 F | BODY MASS INDEX: 24.37 KG/M2 | DIASTOLIC BLOOD PRESSURE: 66 MMHG | HEIGHT: 60 IN | SYSTOLIC BLOOD PRESSURE: 100 MMHG

## 2023-06-21 DIAGNOSIS — O23.599 BACTERIAL VAGINOSIS IN PREGNANCY: Primary | ICD-10-CM

## 2023-06-21 DIAGNOSIS — B96.89 BACTERIAL VAGINOSIS IN PREGNANCY: Primary | ICD-10-CM

## 2023-06-21 PROBLEM — O46.93 VAGINAL BLEEDING IN PREGNANCY, THIRD TRIMESTER: Status: ACTIVE | Noted: 2023-06-21

## 2023-06-21 PROCEDURE — 99214 OFFICE O/P EST MOD 30 MIN: CPT | Performed by: OBSTETRICS & GYNECOLOGY

## 2023-06-21 PROCEDURE — 76815 OB US LIMITED FETUS(S): CPT | Performed by: OBSTETRICS & GYNECOLOGY

## 2023-06-21 PROCEDURE — 87491 CHLMYD TRACH DNA AMP PROBE: CPT | Performed by: OBSTETRICS & GYNECOLOGY

## 2023-06-21 PROCEDURE — 87150 DNA/RNA AMPLIFIED PROBE: CPT | Performed by: OBSTETRICS & GYNECOLOGY

## 2023-06-21 PROCEDURE — 87591 N.GONORRHOEAE DNA AMP PROB: CPT | Performed by: OBSTETRICS & GYNECOLOGY

## 2023-06-21 PROCEDURE — 99213 OFFICE O/P EST LOW 20 MIN: CPT

## 2023-06-21 RX ORDER — METRONIDAZOLE 500 MG/1
500 TABLET ORAL EVERY 12 HOURS SCHEDULED
Qty: 14 TABLET | Refills: 0 | Status: SHIPPED | OUTPATIENT
Start: 2023-06-21 | End: 2023-06-28

## 2023-06-21 NOTE — PROGRESS NOTES
"Triage Note - OB  Abhinav Sabillon 28 y o  female MRN: [id number]  Unit/Bed#: L&D 323-01 Encounter: [id number]    Chief Complaint: leakage of fluid and spotting  [place]: Estimated Date of Delivery: 23    HPI: Patient is a [id number] at 34w1d presents with vaginal leakage and spotting  She was seen in triage 2 days ago for increased vaginal pressure and cramping  She states the pressure has remained the same and she continues to feel intermittent cramping which has not intensified or gotten more frequent  She reports this morning around 8 am passage of clear brown/pink tinged fluid, although not enough to saturate a pad  She denies heavy leakage since then  Denies any bright red blood or clots  Feels good FM  Denies any recent intercourse, abdominal trauma or falls  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  She received BMTZ x 2 on  and  due to +FFN and 1 cm dilation at time of evaluation on  although PPROM/PTL workup was negative  ANC  1  H/o PTD x 2 (34, 33 weeks) with cervical shortening, US indicated cerclage in place since 20+ WGA  Not on vaginal progesterone  2  H/o  --> C/S for NRFHT at 33 weeks -->  of term pregnancy  Plans for TOLAC  3  H/o IUGR in previous pregnancy  4  Insufficient prenatal care, no third trimester growth US this pregnancy, inconsistent outpatient visits     Objective:  /66 (BP Location: Left arm)   Pulse (!) 107   Temp 98 1 °F (36 7 °C) (Oral)   Resp 18   Ht 4' 11 5\" (1 511 m)   Wt 56 3 kg (124 lb 1 9 oz)   LMP 10/15/2022   BMI 24 65 kg/m²     Vitals:   /66 (BP Location: Left arm)   Pulse (!) 107   Temp 98 1 °F (36 7 °C) (Oral)   Resp 18   Ht 4' 11 5\" (1 511 m)   Wt 56 3 kg (124 lb 1 9 oz)   LMP 10/15/2022   BMI 24 65 kg/m²   Body mass index is 24 65 kg/m²  Physical Exam  GEN: no acute distress  ABD: gravid, no fundal TTP  Ext: no LE edema/TTP   SVE: small watery white discharge, no pooling/ferning   " nitrazine positive  No blood present  Cerclage not well visualized on speculum exam but normal parous appearing cervix with no obvious dilation  1/50/-3, cerclage palpable and not on tension   Wet mount: +clue cells/neg trichomonas/neg yeast     Pt had a liner with her that she showed us - small watery discharge with brown tinge           Fetal Heart Tracing:     Baseline: 140 bpm   Variability: moderate  Accelerations: present  Decelerations: none  FHR Category: Category I/ reactive     [place]: irregular contractions q8-10 mins, some irritability       3rd TRIMESTER OBSTETRIC ULTRASOUND  2023  Deepa Naidu MD    Alexis Orozco is a [id number] at 34w1d  INDICATION: r/o PPROM     TECHNIQUE:   Limited transabdominal imaging was performed   The study includes volumetric sweeps and traditional still imaging technique  FINDINGS:  A single intrauterine gestation is identified  Cardiac activity detected   Fetal presentation:  cephalic    Amniotic Fluid Index:    Q1: 1 7cm  Q2: 2cm  Q3: 2 46cm  Q4: 2cm    IMPRESSION:     Single intrauterine pregnancy at 34w1d  Normal AUDREY 8 2 cm        Lab, Imaging and other studies: I have personally reviewed pertinent reports  A/P: [id number] @ 34+1 presenting with spotting/leakage - evidence of BV   1) low suspicion for   labor, ruptured membranes, or abruption  2) flagyl BID x 7 days sent for bacterial vaginosis   3) GC/CT and GBS collected  4) Discharge instructions given to patient and labor precautions reviewed  Jennifer Espinoza, personally saw and examined the patient and performed the entire evaluation             Deepa Naidu MD  2023  9:59 AM

## 2023-06-21 NOTE — TELEPHONE ENCOUNTER
The patient called and stated that she woke up this morning with minor cramps and while at work started bleeding  Spoke with patient after speaking with provider and advised patient if she was just spotting she could be evaluated at office if it is heavy bleeding to proceed to L&D, patient stated that its heavy bleeding and was sent to L&D for evaluation   Charge nurse informed and tiger text sent to on call provider

## 2023-06-21 NOTE — LETTER
[address] [place] [address] LABOR AND DELIVERY  [address] [facility] [address]  Dept: [phone]    June 21, 2023     Patient: Jessi Zuluaga   YOB: 1995   Date of Visit: 6/21/2023       To Whom it May Concern:    Lucio Lawson is under my professional care  She was seen in the hospital from 6/21/2023 to 06/21/23  She may return to work 6/22/23 with the following reasonable accommodations:    - No repetitive heavy lifting over 25 lbs  If her job requires prolonged standing resulting in discomfort, she should be allowed to utilize a heating pad if she desires  - She be allowed frequent breaks at least every 1-2 hours or as needed for water, snacks and to use the bathroom   - If her job requires prolonged sitting, she should be allowed to elevate her legs on a stool    If you have any questions or concerns regarding these recommendations, please feel free to contact my office      Rodney Wilson MD

## 2023-06-22 ENCOUNTER — ULTRASOUND (OUTPATIENT)
Age: 28
End: 2023-06-22
Payer: COMMERCIAL

## 2023-06-22 ENCOUNTER — ROUTINE PRENATAL (OUTPATIENT)
Dept: OBGYN CLINIC | Facility: CLINIC | Age: 28
End: 2023-06-22
Payer: COMMERCIAL

## 2023-06-22 VITALS
HEIGHT: 60 IN | SYSTOLIC BLOOD PRESSURE: 100 MMHG | WEIGHT: 126 LBS | BODY MASS INDEX: 24.74 KG/M2 | DIASTOLIC BLOOD PRESSURE: 60 MMHG

## 2023-06-22 VITALS
BODY MASS INDEX: 24.66 KG/M2 | HEIGHT: 60 IN | WEIGHT: 125.6 LBS | DIASTOLIC BLOOD PRESSURE: 62 MMHG | SYSTOLIC BLOOD PRESSURE: 110 MMHG | HEART RATE: 105 BPM

## 2023-06-22 DIAGNOSIS — O26.873 SHORT CERVIX WITH CERVICAL CERCLAGE IN THIRD TRIMESTER, ANTEPARTUM: ICD-10-CM

## 2023-06-22 DIAGNOSIS — Z3A.33 33 WEEKS GESTATION OF PREGNANCY: ICD-10-CM

## 2023-06-22 DIAGNOSIS — O09.299 IUGR (INTRAUTERINE GROWTH RESTRICTION) IN PRIOR PREGNANCY, PREGNANT: ICD-10-CM

## 2023-06-22 DIAGNOSIS — O09.219 PREVIOUS PRETERM LABOR AFFECTING PREGNANCY, ANTEPARTUM: ICD-10-CM

## 2023-06-22 DIAGNOSIS — Z3A.34 34 WEEKS GESTATION OF PREGNANCY: ICD-10-CM

## 2023-06-22 DIAGNOSIS — O34.33 SHORT CERVIX WITH CERVICAL CERCLAGE IN THIRD TRIMESTER, ANTEPARTUM: ICD-10-CM

## 2023-06-22 DIAGNOSIS — O34.219 HISTORY OF CESAREAN DELIVERY, ANTEPARTUM: Primary | ICD-10-CM

## 2023-06-22 LAB
C TRACH DNA SPEC QL NAA+PROBE: NEGATIVE
N GONORRHOEA DNA SPEC QL NAA+PROBE: NEGATIVE

## 2023-06-22 PROCEDURE — 99212 OFFICE O/P EST SF 10 MIN: CPT | Performed by: OBSTETRICS & GYNECOLOGY

## 2023-06-22 PROCEDURE — 76816 OB US FOLLOW-UP PER FETUS: CPT | Performed by: OBSTETRICS & GYNECOLOGY

## 2023-06-22 PROCEDURE — 99213 OFFICE O/P EST LOW 20 MIN: CPT | Performed by: OBSTETRICS & GYNECOLOGY

## 2023-06-22 NOTE — PROGRESS NOTES
30yo [address] @ 34w 2d  Seen yesterday in triage, PTL ruled out  She was started on metronidazole for BV  She received BMTZ x 2 on  and  due to +FFN and 1 cm dilation at time of evaluation on  although PPROM/PTL workup was negative  She has ultrasound scheduled today at [facility]  Today she states she has same pressure, no contx, no bleeding, no LOF  Discharge is the same  Pregnancy complicated by insufficient PN care- she was seen at 9 weeks and then again at 32 wks in the office, otherwise at [facility] or triage, two PTDs; cerclage placed and will be removed at 36 weeks, hx  with successful , desires TOLAC ; h/o SGA infants, VSD   3rd trim growth scans d/t hx SGA    1 hr gtt given at last visit, not done to date  TDAP, consent done at last visit    [address], PTL reviewed, RTO 2 weeks

## 2023-06-23 LAB — GP B STREP DNA SPEC QL NAA+PROBE: NEGATIVE

## 2023-06-23 NOTE — PROGRESS NOTES
The patient was seen today for an ultrasound  Please see ultrasound report (located under Ob Procedures) for additional details  Thank you very much for allowing us to participate in the care of this very nice patient  Should you have any questions, please do not hesitate to contact me  Fuentes Fernández MD [address] [facility]  Attending Physician, Debbie

## 2023-07-05 ENCOUNTER — HOSPITAL ENCOUNTER (OUTPATIENT)
Facility: HOSPITAL | Age: 28
Discharge: HOME | End: 2023-07-05
Attending: OBSTETRICS & GYNECOLOGY | Admitting: OBSTETRICS & GYNECOLOGY
Payer: COMMERCIAL

## 2023-07-05 ENCOUNTER — ROUTINE PRENATAL (OUTPATIENT)
Dept: OBGYN CLINIC | Facility: CLINIC | Age: 28
End: 2023-07-05

## 2023-07-05 VITALS
SYSTOLIC BLOOD PRESSURE: 110 MMHG | BODY MASS INDEX: 24.58 KG/M2 | DIASTOLIC BLOOD PRESSURE: 54 MMHG | HEIGHT: 60 IN | WEIGHT: 125.2 LBS

## 2023-07-05 VITALS
SYSTOLIC BLOOD PRESSURE: 119 MMHG | TEMPERATURE: 98.1 F | DIASTOLIC BLOOD PRESSURE: 57 MMHG | HEART RATE: 101 BPM | RESPIRATION RATE: 16 BRPM

## 2023-07-05 DIAGNOSIS — O09.299 IUGR (INTRAUTERINE GROWTH RESTRICTION) IN PRIOR PREGNANCY, PREGNANT: ICD-10-CM

## 2023-07-05 DIAGNOSIS — O09.33 INSUFFICIENT PRENATAL CARE IN THIRD TRIMESTER: ICD-10-CM

## 2023-07-05 DIAGNOSIS — Z3A.36 36 WEEKS GESTATION OF PREGNANCY: ICD-10-CM

## 2023-07-05 DIAGNOSIS — O34.219 HISTORY OF CESAREAN DELIVERY, ANTEPARTUM: Primary | ICD-10-CM

## 2023-07-05 DIAGNOSIS — O09.899 HISTORY OF PRETERM DELIVERY, CURRENTLY PREGNANT: ICD-10-CM

## 2023-07-05 DIAGNOSIS — O34.33 SHORT CERVIX WITH CERVICAL CERCLAGE IN THIRD TRIMESTER, ANTEPARTUM: ICD-10-CM

## 2023-07-05 DIAGNOSIS — O09.219 PREVIOUS PRETERM LABOR AFFECTING PREGNANCY, ANTEPARTUM: ICD-10-CM

## 2023-07-05 DIAGNOSIS — O26.873 SHORT CERVIX WITH CERVICAL CERCLAGE IN THIRD TRIMESTER, ANTEPARTUM: ICD-10-CM

## 2023-07-05 DIAGNOSIS — O34.33 CERVICAL CERCLAGE SUTURE PRESENT IN THIRD TRIMESTER: ICD-10-CM

## 2023-07-05 PROCEDURE — 99213 OFFICE O/P EST LOW 20 MIN: CPT

## 2023-07-05 PROCEDURE — 99214 OFFICE O/P EST MOD 30 MIN: CPT | Performed by: OBSTETRICS & GYNECOLOGY

## 2023-07-05 RX ORDER — ACETAMINOPHEN 325 MG/1
975 TABLET ORAL ONCE
Status: COMPLETED | OUTPATIENT
Start: 2023-07-05 | End: 2023-07-05

## 2023-07-05 RX ADMIN — ACETAMINOPHEN 325MG 975 MG: 325 TABLET ORAL at 18:39

## 2023-07-05 NOTE — Clinical Note
I removed the patient's cerclage at her visit today--not sure if I pulled the right procedure code if you could please verify.  Thanks

## 2023-07-05 NOTE — PROGRESS NOTES
L&D Triage Note - OB/GYN  Nohelia Spencer 28 y.o. female MRN: [id number]  Unit/Bed#: L&D 329-01 Encounter: [id number]      ASSESSMENT:    Nohelia Spencer is a 28 y.o. [id number] at 36w1d presenting to triage with cramping after removal of cerclage in the office today. SVE unchanged from office exam, appropriate for discharge    PLAN:    1) Discomfort after cerclage removal  -R/o labor neg, SVE unchanged from office exam  -NST reactive, irritability on toco  -Patient appears comfortable  -Tylenol 975mg ordered  2) Continue routine prenatal care  3) Discharge from [facility] triage with  labor precautions    - Reviewed rupture of membranes, false vs true labor, decreased fetal movement, and vaginal bleeding   - Pt to call provider with any concerns and follow up at her next scheduled prenatal appointment    - Case discussed with Dr. Fan Browning:    Nohelia Spencer 28 y.o. [id number] at 36w1d with an Estimated Date of Delivery: 23 presenting to triage after cerclage removal in the office today. Patient states that since cerclage removal she has had significant back as well as abdominal and vaginal discomfort. She briefly had vaginal spotting which has since dissipated. Patient does not feel discrete contractions but does feel uncomfortable. Patient states that she still has part of the cerclage in place that should be removed while she is in labor after she has an epidural.  Denies loss of fluid, continuous vaginal bleeding, and feels fetal movement, though decreased slightly from before.       OBJECTIVE:    Vitals:    23 1826   BP: 119/57   Pulse: 101   Resp: 16   Temp: 98.1 °F (36.7 °C)       ROS:  Constitutional: Negative  Respiratory: Negative  Cardiovascular: Negative    Gastrointestinal: Negative    General Physical Exam:  General: in no apparent distress  Cardiovascular: No murmurs  Lungs: non-labored breathing  Abdomen: abdomen is soft without significant tenderness, masses, organomegaly or guarding  Lower extremeties: nontender    Cervical Exam  Speculum: Cervical os is closed and not on tension, absence of bleeding, absence of pooling of fluid, no cerclage strings visible    SVE:   Cervical Dilation: 2  Cervical Effacement: 70  Fetal Station: -2  OB Examiner: Alex    FHT:  Baseline Rate: 155 bpm  Variability: Moderate 6-25 bpm  Accelerations: 15 x 15 or greater  Decelerations: None    TOCO:   Contraction Frequency (minutes): Occasional with irritability  Contraction Duration (seconds): 40-50  Contraction Quality: Mild    Lab Results   Component Value Date    WBC 9.83 04/13/2023    HGB 11.0 (L) 04/13/2023    HCT 33.3 (L) 04/13/2023     04/13/2023     Lab Results   Component Value Date    K 3.5 12/12/2022     12/12/2022    CO2 25 12/12/2022    BUN 8 12/12/2022    CREATININE 0.61 12/12/2022    AST 19 12/12/2022    ALT 11 (L) 12/12/2022     Reid Mehta MD,  OBGYN PGY-2  7/5/2023 7:09 PM

## 2023-07-05 NOTE — PROGRESS NOTES
Pt is a 28 y.o. [id number] 36w1d  Pregnancy is complicated by Insufficient prenatal care, short cervix s/p cerclage placement, ? PTL s/p BMZ for FLM, h.o PTD, h.o C/s with successful , desires TOLAC, VSD, h.o SGA. Still has not done 1 hour gtt, encouraged to do so    Pt reports +FM. Denies vb, lof/o Notes irregular ctx and increasing pressure. Labor precautions and fkc reviewed  After providing consent, speculum placed in vagina and cerclage removed. A portion of suture remains from 5-7. Pt unable to tolerate removal of this. Advised it will be removed during labor or may spontaneously fall out. After removal of cerclage, cervix is 3 cm dilate  GBS negative on . If still pregnant by , will need to repeat   F/U 1 week.

## 2023-07-12 ENCOUNTER — ROUTINE PRENATAL (OUTPATIENT)
Dept: OBGYN CLINIC | Facility: CLINIC | Age: 28
End: 2023-07-12
Payer: COMMERCIAL

## 2023-07-12 VITALS
HEART RATE: 85 BPM | HEIGHT: 60 IN | SYSTOLIC BLOOD PRESSURE: 92 MMHG | DIASTOLIC BLOOD PRESSURE: 56 MMHG | BODY MASS INDEX: 24.35 KG/M2 | WEIGHT: 124 LBS

## 2023-07-12 DIAGNOSIS — O09.33 INSUFFICIENT PRENATAL CARE IN THIRD TRIMESTER: Primary | ICD-10-CM

## 2023-07-12 DIAGNOSIS — Z3A.36 36 WEEKS GESTATION OF PREGNANCY: ICD-10-CM

## 2023-07-12 DIAGNOSIS — O34.219 HISTORY OF CESAREAN DELIVERY, ANTEPARTUM: ICD-10-CM

## 2023-07-12 DIAGNOSIS — O09.899 HISTORY OF PRETERM DELIVERY, CURRENTLY PREGNANT: ICD-10-CM

## 2023-07-12 PROCEDURE — 99213 OFFICE O/P EST LOW 20 MIN: CPT | Performed by: NURSE PRACTITIONER

## 2023-07-12 NOTE — PROGRESS NOTES
29 yo  at [facility] gestation. Pregnancy is complicated by Insufficient prenatal care, short cervix s/p cerclage placement, ? PTL s/p BMZ for FLM, h.o PTD, h.o C/s with successful , desires TOLAC, VSD, h.o SGA     Cerclage removed 23. GBS negative on . If still pregnant by , will need to repeat   States that she noticed a watery discharge that started Monday evening; describes as a intermittent gush, not a constant trickle. Also admits to more uterine contractions and pelvic pressure-- sometimes regular, Q 10 mins, for about 30 mins, other times random. Mentions that last night baby was "very active", had hiccups for a long time. Baby is active, denies vaginal bleeding. S=D, normal FHTs, normal BP  Cervix: 3/ 50/ 0    RV one week.

## 2023-07-17 ENCOUNTER — HOSPITAL ENCOUNTER (INPATIENT)
Facility: HOSPITAL | Age: 28
LOS: 2 days | Discharge: HOME | End: 2023-07-19
Attending: OBSTETRICS & GYNECOLOGY | Admitting: OBSTETRICS & GYNECOLOGY
Payer: COMMERCIAL

## 2023-07-17 ENCOUNTER — ANESTHESIA (INPATIENT)
Dept: ANESTHESIOLOGY | Facility: HOSPITAL | Age: 28
End: 2023-07-17
Payer: COMMERCIAL

## 2023-07-17 ENCOUNTER — ANESTHESIA EVENT (INPATIENT)
Dept: ANESTHESIOLOGY | Facility: HOSPITAL | Age: 28
End: 2023-07-17
Payer: COMMERCIAL

## 2023-07-17 ENCOUNTER — TELEPHONE (OUTPATIENT)
Dept: OBGYN CLINIC | Facility: CLINIC | Age: 28
End: 2023-07-17

## 2023-07-17 DIAGNOSIS — Z3A.37 37 WEEKS GESTATION OF PREGNANCY: ICD-10-CM

## 2023-07-17 DIAGNOSIS — O34.219 VBAC, DELIVERED: Primary | ICD-10-CM

## 2023-07-17 LAB
ABO GROUP BLD: NORMAL
AMPHETAMINES SERPL QL SCN: NEGATIVE
BARBITURATES UR QL: NEGATIVE
BENZODIAZ UR QL: NEGATIVE
BLD GP AB SCN SERPL QL: NEGATIVE
COCAINE UR QL: NEGATIVE
ERYTHROCYTE [DISTWIDTH] IN BLOOD BY AUTOMATED COUNT: 14.9 % (ref 11.6–15.1)
GLUCOSE SERPL-MCNC: 84 MG/DL (ref 65–140)
HCT VFR BLD AUTO: 30.7 % (ref 34.8–46.1)
HGB BLD-MCNC: 9.5 G/DL (ref 11.5–15.4)
HOLD SPECIMEN: YES
MCH RBC QN AUTO: 26.5 PG (ref 26.8–34.3)
MCHC RBC AUTO-ENTMCNC: 30.9 G/DL (ref 31.4–37.4)
MCV RBC AUTO: 86 FL (ref 82–98)
METHADONE UR QL: NEGATIVE
OPIATES UR QL SCN: NEGATIVE
OXYCODONE+OXYMORPHONE UR QL SCN: NEGATIVE
PCP UR QL: NEGATIVE
PLATELET # BLD AUTO: 213 THOUSANDS/UL (ref 149–390)
PMV BLD AUTO: 11.4 FL (ref 8.9–12.7)
RBC # BLD AUTO: 3.58 MILLION/UL (ref 3.81–5.12)
RH BLD: POSITIVE
SPECIMEN EXPIRATION DATE: NORMAL
THC UR QL: NEGATIVE
WBC # BLD AUTO: 8.9 THOUSAND/UL (ref 4.31–10.16)

## 2023-07-17 PROCEDURE — 86803 HEPATITIS C AB TEST: CPT

## 2023-07-17 PROCEDURE — 86850 RBC ANTIBODY SCREEN: CPT | Performed by: OBSTETRICS & GYNECOLOGY

## 2023-07-17 PROCEDURE — 86901 BLOOD TYPING SEROLOGIC RH(D): CPT | Performed by: OBSTETRICS & GYNECOLOGY

## 2023-07-17 PROCEDURE — 82948 REAGENT STRIP/BLOOD GLUCOSE: CPT

## 2023-07-17 PROCEDURE — 99215 OFFICE O/P EST HI 40 MIN: CPT

## 2023-07-17 PROCEDURE — 85027 COMPLETE CBC AUTOMATED: CPT | Performed by: OBSTETRICS & GYNECOLOGY

## 2023-07-17 PROCEDURE — 86780 TREPONEMA PALLIDUM: CPT | Performed by: OBSTETRICS & GYNECOLOGY

## 2023-07-17 PROCEDURE — 86900 BLOOD TYPING SEROLOGIC ABO: CPT | Performed by: OBSTETRICS & GYNECOLOGY

## 2023-07-17 PROCEDURE — NC001 PR NO CHARGE: Performed by: OBSTETRICS & GYNECOLOGY

## 2023-07-17 PROCEDURE — 80307 DRUG TEST PRSMV CHEM ANLYZR: CPT

## 2023-07-17 RX ORDER — LIDOCAINE HYDROCHLORIDE AND EPINEPHRINE 15; 5 MG/ML; UG/ML
INJECTION, SOLUTION EPIDURAL AS NEEDED
Status: DISCONTINUED | OUTPATIENT
Start: 2023-07-17 | End: 2023-07-18 | Stop reason: HOSPADM

## 2023-07-17 RX ORDER — OXYTOCIN/RINGER'S LACTATE 30/500 ML
1-30 PLASTIC BAG, INJECTION (ML) INTRAVENOUS
Status: DISCONTINUED | OUTPATIENT
Start: 2023-07-17 | End: 2023-07-18

## 2023-07-17 RX ORDER — SODIUM CHLORIDE, SODIUM LACTATE, POTASSIUM CHLORIDE, CALCIUM CHLORIDE 600; 310; 30; 20 MG/100ML; MG/100ML; MG/100ML; MG/100ML
125 INJECTION, SOLUTION INTRAVENOUS CONTINUOUS
Status: DISCONTINUED | OUTPATIENT
Start: 2023-07-17 | End: 2023-07-18

## 2023-07-17 RX ORDER — ROPIVACAINE HYDROCHLORIDE 2 MG/ML
INJECTION, SOLUTION EPIDURAL; INFILTRATION; PERINEURAL AS NEEDED
Status: DISCONTINUED | OUTPATIENT
Start: 2023-07-17 | End: 2023-07-18 | Stop reason: HOSPADM

## 2023-07-17 RX ORDER — BUPIVACAINE HYDROCHLORIDE 2.5 MG/ML
30 INJECTION, SOLUTION EPIDURAL; INFILTRATION; INTRACAUDAL ONCE AS NEEDED
Status: DISCONTINUED | OUTPATIENT
Start: 2023-07-17 | End: 2023-07-18

## 2023-07-17 RX ORDER — ONDANSETRON 2 MG/ML
4 INJECTION INTRAMUSCULAR; INTRAVENOUS EVERY 6 HOURS PRN
Status: DISCONTINUED | OUTPATIENT
Start: 2023-07-17 | End: 2023-07-18

## 2023-07-17 RX ADMIN — ROPIVACAINE HYDROCHLORIDE 4 ML: 2 INJECTION EPIDURAL; INFILTRATION; PERINEURAL at 17:36

## 2023-07-17 RX ADMIN — ROPIVACAINE HYDROCHLORIDE 8 ML/HR: 2 INJECTION EPIDURAL; INFILTRATION; PERINEURAL at 17:46

## 2023-07-17 RX ADMIN — ROPIVACAINE HYDROCHLORIDE 4 ML: 2 INJECTION EPIDURAL; INFILTRATION; PERINEURAL at 17:40

## 2023-07-17 RX ADMIN — SODIUM CHLORIDE, SODIUM LACTATE, POTASSIUM CHLORIDE, AND CALCIUM CHLORIDE 999 ML/HR: .6; .31; .03; .02 INJECTION, SOLUTION INTRAVENOUS at 14:58

## 2023-07-17 RX ADMIN — SODIUM CHLORIDE, SODIUM LACTATE, POTASSIUM CHLORIDE, AND CALCIUM CHLORIDE 125 ML/HR: .6; .31; .03; .02 INJECTION, SOLUTION INTRAVENOUS at 15:54

## 2023-07-17 RX ADMIN — SODIUM CHLORIDE, SODIUM LACTATE, POTASSIUM CHLORIDE, AND CALCIUM CHLORIDE 125 ML/HR: .6; .31; .03; .02 INJECTION, SOLUTION INTRAVENOUS at 19:50

## 2023-07-17 RX ADMIN — LIDOCAINE HYDROCHLORIDE AND EPINEPHRINE 3 ML: 15; 5 INJECTION, SOLUTION EPIDURAL at 17:29

## 2023-07-17 RX ADMIN — SODIUM CHLORIDE, SODIUM LACTATE, POTASSIUM CHLORIDE, AND CALCIUM CHLORIDE 999 ML/HR: .6; .31; .03; .02 INJECTION, SOLUTION INTRAVENOUS at 17:20

## 2023-07-17 RX ADMIN — OXYTOCIN 2 MILLI-UNITS/MIN: 10 INJECTION INTRAVENOUS at 23:54

## 2023-07-17 RX ADMIN — ROPIVACAINE HYDROCHLORIDE: 2 INJECTION, SOLUTION EPIDURAL; INFILTRATION at 18:02

## 2023-07-17 NOTE — OB LABOR/OXYTOCIN SAFETY PROGRESS
Labor Progress Note - Todd Tubbs 28 y.o. female MRN: [id number]    Unit/Bed#: L&D 325-01 Encounter: [id number]       Contraction Frequency (minutes): 4  Contraction Quality: Mild  Tachysystole: No   Cervical Dilation: 5        Cervical Effacement: 80  Fetal Station: -2  Baseline Rate: 135 bpm  Fetal Heart Rate: 140 BPM                  Vital Signs:   Vitals:    07/17/23 1916   BP: (!) 88/53   Pulse: 78   Resp:    Temp:        Notes/comments:   AROM, blood tinged  Comfortable with epidural       Ton Santos MD 7/17/2023 7:27 PM

## 2023-07-17 NOTE — PLAN OF CARE
Problem: Knowledge Deficit  Goal: Verbalizes understanding of labor plan  Description: Assess patient/family/caregiver's baseline knowledge level and ability to understand information. Provide education via patient/family/caregiver's preferred learning method at appropriate level of understanding. 1. Provide teaching at level of understanding. 2. Provide teaching via preferred learning method(s). Outcome: Progressing     Problem: Labor & Delivery  Goal: Manages discomfort  Description: Assess and monitor for signs and symptoms of discomfort. Assess patient's pain level regularly and per hospital policy. Administer medications as ordered. Support use of nonpharmacological methods to help control pain such as distraction, imagery, relaxation, and application of heat and cold. Collaborate with interdisciplinary team and patient to determine appropriate pain management plan. 1. Include patient in decisions related to comfort. 2. Offer non-pharmacological pain management interventions. 3. Report ineffective pain management to physician. Outcome: Progressing  Goal: Patient vital signs are stable  Description: 1. Assess vital signs - vaginal delivery.   Outcome: Progressing

## 2023-07-17 NOTE — ANESTHESIA PREPROCEDURE EVALUATION
Procedure:  LABOR ANALGESIA    Relevant Problems   GYN   (+) 37 weeks gestation of pregnancy        Physical Exam    Airway    Mallampati score: II  TM Distance: >3 FB  Neck ROM: full     Dental       Cardiovascular  Cardiovascular exam normal    Pulmonary  Pulmonary exam normal     Other Findings        Anesthesia Plan  ASA Score- 2     Anesthesia Type- epidural with ASA Monitors. Additional Monitors:   Airway Plan:           Plan Factors-    Chart reviewed. Existing labs reviewed. Patient is not a current smoker. Patient instructed to abstain from smoking on day of procedure. Patient did not smoke on day of surgery. Obstructive sleep apnea risk education given perioperatively. Induction-     Postoperative Plan-     Informed Consent- Anesthetic plan and risks discussed with patient.

## 2023-07-17 NOTE — H&P
[facility] 28 y.o. female MRN: [id number]  Unit/Bed#: L&D 325-01 Encounter: [id number]    Assessment: 28 y.o. [id number] at 37w6d admitted for labor . SVE: /-2  Clinical EFW: 27th percentile ; Cephalic confirmed by palpation  GBS status: negative       Plan:   Insufficient prenatal care in third trimester  Assessment & Plan  Many no-shows throughout pregnancy  UDS collected on admission  Consider CM post-partum    * 37 weeks gestation of pregnancy  Assessment & Plan  Admit to OBN   Clear liquid diet   F/u T&S, CBC, RPR   IVF LR 125cc/hr   Continuous fetal monitoring and tocometry   Analgesia at maternal request   Vertex by TAUS  Induction plan: AROM          Discussed case and plan w/ Dr. Caruso       Chief Complaint: contractions    HPI: Galilea Linder is a 28 y.o. [id number] with an AUSTIN of 2023, by Ultrasound at 37w6d who is being admitted for labor . She complains of uterine contractions, occurring every 5 minutes, has no LOF, and reports no VB. She states she has felt good FM. Of note patient had a history indicated cerclage in this pregnancy. Cerclage string was cut in the office on 23 a small portion of suture remained at this time and was successfully removed upon her presentation to labor and delivery today.      Patient Active Problem List   Diagnosis   • Ex-smoker   • History of  delivery, antepartum   • COVID-19 vaccination declined   • Abdominal pain during pregnancy in third trimester   • , delivered   • IUGR (intrauterine growth restriction) in prior pregnancy, pregnant   • 37 weeks gestation of pregnancy   • Previous  labor affecting pregnancy, antepartum   • History of  delivery, currently pregnant   • Short cervix with cervical cerclage in third trimester, antepartum   • Insufficient prenatal care in third trimester   • Vaginal bleeding in pregnancy, third trimester       Baby complications/comments: none    Review of Systems Constitutional: Negative for chills and fever. Respiratory: Negative for cough, shortness of breath and wheezing. Cardiovascular: Negative for chest pain and leg swelling. Gastrointestinal: Negative for abdominal pain, diarrhea, nausea and vomiting. Genitourinary: Negative for pelvic pain, vaginal bleeding and vaginal discharge. Musculoskeletal: Negative for back pain. Neurological: Negative for weakness, light-headedness and headaches. OB Hx:  OB History    Para Term  AB Living   8 3 1 2 4 3   SAB IAB Ectopic Multiple Live Births   2 2     3      # Outcome Date GA Lbr Mo/2nd Weight Sex Delivery Anes PTL Lv   8 Current            7 Term 22 38w2d / 00:27 2545 g (5 lb 9.8 oz) M Vag-Spont EPI N OLLIE   6 SAB      SAB      5 SAB 19 8w0d    SAB  N       Birth Comments: had D and C   4 IAB 2018           3  17 33w0d  2080 g (4 lb 9.4 oz) M CS-LTranv Spinal Y OLLIE      Birth Comments: Fetal brady[facility] (pre noted "Had anxiety attack affecting fetal status, had . ")      Complications: Fetal Intolerance   2 IAB 06/01/15 6w0d          1  14 34w3d  2080 g (4 lb 9.4 oz) F Vag-Spont EPI Y OLLIE      Birth Comments: Prolonged PPROM ( admitted 1/3) [email or web address], NICU x 12d, feeding/weight gain      Complications: Premature rupture of membranes during pregnancy, delivered, Rupture of membranes with delay of delivery, Small for gestational age infant      Obstetric Comments   Menarche 8   Irregular menses       Past Medical Hx:  Past Medical History:   Diagnosis Date   • Anemia    • Former smoker     Stop smoking 2020   • Miscarriage     x 2   • Varicella     vaccine       Past Surgical hx:  Past Surgical History:   Procedure Laterality Date   •  SECTION  2017    Patient states anxiety attack which affected fetal tracing necessitating .    • DILATION AND EVACUATION  2016    Elective termination   • DILATION AND EVACUATION  2018    Persistent Missed pregnancy   • LA CERCLAGE UTERINE CERVIX NONOBSTETRICAL N/A 3/16/2023    Procedure: CERCLAGE CERVICAL;  Surgeon: Leonid Kearney MD;  Location: [facility];  Service: Gynecology   • LA TX MISSED  FIRST TRIMESTER SURGICAL N/A 2020    Procedure: DILATATION AND EVACUATION (D&E) ( 8 OF WEEKS); Surgeon: Jessica Stern MD;  Location: [facility];  Service: Gynecology   • WISDOM TOOTH EXTRACTION         Social Hx:  Alcohol use: denies  Tobacco use: denies  Other substance use: denies    Allergies   Allergen Reactions   • Penicillins Hives       Medications Prior to Admission   Medication   • acetaminophen (TYLENOL) 325 mg tablet   • Prenatal Vit-Fe Fumarate-FA (PRENATAL VITAMIN PO)       Objective:  BP: 102/61    Physical Exam:  Physical Exam  Constitutional:       Appearance: Normal appearance. Cardiovascular:      Rate and Rhythm: Normal rate and regular rhythm. Pulmonary:      Effort: Pulmonary effort is normal. No respiratory distress. Abdominal:      Palpations: Abdomen is soft. Tenderness: There is no abdominal tenderness. Musculoskeletal:         General: No swelling or tenderness. Neurological:      General: No focal deficit present. Mental Status: She is alert and oriented to person, place, and time. Skin:     General: Skin is warm and dry. Vitals reviewed.             FHT:  Baseline Rate: 145 bpm  Variability: Moderate 6-25 bpm  Accelerations: 15 x 15 or greater  Decelerations: None    TOCO:   Contraction Frequency (minutes): irregular  Contraction Duration (seconds): 60-90  Contraction Quality: Mild    Lab Results   Component Value Date    WBC 9.83 2023    HGB 11.0 (L) 2023    HCT 33.3 (L) 2023     2023     Lab Results   Component Value Date    K 3.5 2022     2022    CO2 25 2022    BUN 8 2022    CREATININE 0.61 2022    AST 19 2022    ALT 11 (L) 2022 Prenatal Labs: Reviewed      Blood type: B positive  Antibody: Negative  GBS: Negative  HIV: Non-reactive  Rubella: Immune  Syphilis IgM/IgG: Non-reactive  HBsAg: Non-reactive  HCAb: Pending  Chlamydia: Negative  Gonorrhea: Negative  Diabetes 1 hour screen: Not completed  3 hour glucose: Not completed  Platelets: 005  Hgb: 11.0  >2 Midnights  INPATIENT     Signature/Title: Richard Merlin, MD  Date: 7/17/2023  Time: 2:43 PM

## 2023-07-17 NOTE — TELEPHONE ENCOUNTER
Late entry:  GA 37w6d patient  vaginal/ rectal pressure. Pt stated  symptoms started  this morning around 1 AM she feels like she is having contractions every min -5 min apart, but pt expressed uncertainty. No bleeding. Patient  Was uncertain  if she was having any leakage. Provider notified, who advice pt to go to Labor and delivery for evaluation and treatment. Pt notified.

## 2023-07-17 NOTE — ASSESSMENT & PLAN NOTE
• Routine postpartum care  • Encourage ambulation  • Encourage familial bonding  • Lactation support as needed, she is bottle feeding  • Pain: Motrin/Tylenol around the clock  • Postpartum Contraceptive plan: tubal, will f/u outpatient for surgical planning  • Pate catheter: removed at delivery, voiding spontaneously  • DVT Ppx: ambulation

## 2023-07-17 NOTE — ANESTHESIA PROCEDURE NOTES
Epidural Block    Patient location during procedure: OB  Start time: 7/17/2023 5:28 PM  Reason for block: procedure for pain and at surgeon's request  Staffing  Performed by: Jennifer Guerrero DO  Authorized by: Jennifer Guerrero DO    Preanesthetic Checklist  Completed: patient identified, IV checked, site marked, risks and benefits discussed, surgical consent, monitors and equipment checked, pre-op evaluation and timeout performed  Epidural  Patient position: sitting  Prep: Betadine  Patient monitoring: frequent blood pressure checks  Approach: midline  Location: lumbar  Injection technique: SHAUNA air  Needle  Needle type: Tuohy   Needle gauge: 18 G  Catheter type: side hole  Catheter size: 20 G  Catheter at skin depth: 10 cm  Catheter securement method: clear occlusive dressing  Test dose: negative  Assessment  Number of attempts: 1negative aspiration for CSF, negative aspiration for heme and no paresthesia on injection  patient tolerated the procedure well with no immediate complications

## 2023-07-18 PROBLEM — O34.219 VBAC (VAGINAL BIRTH AFTER CESAREAN): Status: ACTIVE | Noted: 2023-02-14

## 2023-07-18 LAB
BASE EXCESS BLDCOA CALC-SCNC: -3.5 MMOL/L (ref 3–11)
BASE EXCESS BLDCOV CALC-SCNC: -2.7 MMOL/L (ref 1–9)
HCO3 BLDCOA-SCNC: 24.7 MMOL/L (ref 17.3–27.3)
HCO3 BLDCOV-SCNC: 22.7 MMOL/L (ref 12.2–28.6)
HCV AB SER QL: NORMAL
O2 CT VFR BLDCOA CALC: 7.6 ML/DL
OXYHGB MFR BLDCOA: 38.5 %
OXYHGB MFR BLDCOV: 50.6 %
PCO2 BLDCOA: 57.7 MM[HG] (ref 30–60)
PCO2 BLDCOV: 41.6 MM HG (ref 27–43)
PH BLDCOA: 7.25 [PH] (ref 7.23–7.43)
PH BLDCOV: 7.36 [PH] (ref 7.19–7.49)
PO2 BLDCOA: 19.1 MM HG (ref 5–25)
PO2 BLDCOV: 20.3 MM HG (ref 15–45)
SAO2 % BLDCOV: 9.2 ML/DL
TREPONEMA PALLIDUM IGG+IGM AB [PRESENCE] IN SERUM OR PLASMA BY IMMUNOASSAY: NORMAL

## 2023-07-18 PROCEDURE — 82805 BLOOD GASES W/O2 SATURATION: CPT | Performed by: OBSTETRICS & GYNECOLOGY

## 2023-07-18 RX ORDER — DOCUSATE SODIUM 100 MG/1
100 CAPSULE, LIQUID FILLED ORAL 2 TIMES DAILY
Status: DISCONTINUED | OUTPATIENT
Start: 2023-07-18 | End: 2023-07-19 | Stop reason: HOSPADM

## 2023-07-18 RX ORDER — ONDANSETRON 2 MG/ML
4 INJECTION INTRAMUSCULAR; INTRAVENOUS EVERY 8 HOURS PRN
Status: DISCONTINUED | OUTPATIENT
Start: 2023-07-18 | End: 2023-07-19 | Stop reason: HOSPADM

## 2023-07-18 RX ORDER — OXYTOCIN/RINGER'S LACTATE 30/500 ML
250 PLASTIC BAG, INJECTION (ML) INTRAVENOUS ONCE
Status: DISCONTINUED | OUTPATIENT
Start: 2023-07-18 | End: 2023-07-19 | Stop reason: HOSPADM

## 2023-07-18 RX ORDER — IBUPROFEN 600 MG/1
600 TABLET ORAL EVERY 6 HOURS
Status: DISCONTINUED | OUTPATIENT
Start: 2023-07-18 | End: 2023-07-19 | Stop reason: HOSPADM

## 2023-07-18 RX ORDER — ACETAMINOPHEN 325 MG/1
650 TABLET ORAL EVERY 4 HOURS PRN
Status: DISCONTINUED | OUTPATIENT
Start: 2023-07-18 | End: 2023-07-19 | Stop reason: HOSPADM

## 2023-07-18 RX ORDER — DIAPER,BRIEF,INFANT-TODD,DISP
1 EACH MISCELLANEOUS DAILY PRN
Status: DISCONTINUED | OUTPATIENT
Start: 2023-07-18 | End: 2023-07-19 | Stop reason: HOSPADM

## 2023-07-18 RX ORDER — CALCIUM CARBONATE 500 MG/1
1000 TABLET, CHEWABLE ORAL DAILY PRN
Status: DISCONTINUED | OUTPATIENT
Start: 2023-07-18 | End: 2023-07-19 | Stop reason: HOSPADM

## 2023-07-18 RX ORDER — DIPHENHYDRAMINE HCL 25 MG
25 TABLET ORAL EVERY 6 HOURS PRN
Status: DISCONTINUED | OUTPATIENT
Start: 2023-07-18 | End: 2023-07-19 | Stop reason: HOSPADM

## 2023-07-18 RX ADMIN — DOCUSATE SODIUM 100 MG: 100 CAPSULE, LIQUID FILLED ORAL at 17:01

## 2023-07-18 RX ADMIN — WITCH HAZEL 1 PAD: 500 SOLUTION RECTAL; TOPICAL at 06:31

## 2023-07-18 RX ADMIN — DOCUSATE SODIUM 100 MG: 100 CAPSULE, LIQUID FILLED ORAL at 11:33

## 2023-07-18 RX ADMIN — BENZOCAINE AND LEVOMENTHOL 1 APPLICATION: 200; 5 SPRAY TOPICAL at 06:31

## 2023-07-18 RX ADMIN — IBUPROFEN 600 MG: 600 TABLET, FILM COATED ORAL at 05:31

## 2023-07-18 RX ADMIN — ACETAMINOPHEN 325MG 650 MG: 325 TABLET ORAL at 05:31

## 2023-07-18 RX ADMIN — IBUPROFEN 600 MG: 600 TABLET, FILM COATED ORAL at 20:30

## 2023-07-18 RX ADMIN — ROPIVACAINE HYDROCHLORIDE: 2 INJECTION, SOLUTION EPIDURAL; INFILTRATION at 00:00

## 2023-07-18 RX ADMIN — ACETAMINOPHEN 325MG 650 MG: 325 TABLET ORAL at 20:30

## 2023-07-18 RX ADMIN — SODIUM CHLORIDE, SODIUM LACTATE, POTASSIUM CHLORIDE, AND CALCIUM CHLORIDE 125 ML/HR: .6; .31; .03; .02 INJECTION, SOLUTION INTRAVENOUS at 02:07

## 2023-07-18 NOTE — OB LABOR/OXYTOCIN SAFETY PROGRESS
Oxytocin Safety Progress Check Note - Enrrique Echevarria 28 y.o. female MRN: [id number]    Unit/Bed#: L&D 325-01 Encounter: [id number]    Dose (pauly-units/min) Oxytocin: 4 pauly-units/min  Contraction Frequency (minutes): 2-4  Contraction Quality: Mild  Tachysystole: No   Cervical Dilation: 6        Cervical Effacement: 80  Fetal Station: 0  Baseline Rate: 150 bpm  Fetal Heart Rate: 140 BPM  FHR Category: Category I               Vital Signs:   Vitals:    07/18/23 0118   BP: 99/59   Pulse: 75   Resp:    Temp: 98.7 °F (37.1 °C)   SpO2:        Notes/comments:   Patient reports increased rectal pressure, will consult anesthesia for a bollus.          Bridget Chaney MD 7/18/2023 1:26 AM

## 2023-07-18 NOTE — PLAN OF CARE
Problem: Knowledge Deficit  Goal: Verbalizes understanding of labor plan  Description: Assess patient/family/caregiver's baseline knowledge level and ability to understand information. Provide education via patient/family/caregiver's preferred learning method at appropriate level of understanding. 1. Provide teaching at level of understanding. 2. Provide teaching via preferred learning method(s). Outcome: Completed     Problem: Labor & Delivery  Goal: Manages discomfort  Description: Assess and monitor for signs and symptoms of discomfort. Assess patient's pain level regularly and per hospital policy. Administer medications as ordered. Support use of nonpharmacological methods to help control pain such as distraction, imagery, relaxation, and application of heat and cold. Collaborate with interdisciplinary team and patient to determine appropriate pain management plan. 1. Include patient in decisions related to comfort. 2. Offer non-pharmacological pain management interventions. 3. Report ineffective pain management to physician. Outcome: Completed  Goal: Patient vital signs are stable  Description: 1. Assess vital signs - vaginal delivery.   Outcome: Completed     Problem: POSTPARTUM  Goal: Experiences normal postpartum course  Description: INTERVENTIONS:  - Monitor maternal vital signs  - Assess uterine involution and lochia  Outcome: Progressing  Goal: Appropriate maternal -  bonding  Description: INTERVENTIONS:  - Identify family support  - Assess for appropriate maternal/infant bonding   -Encourage maternal/infant bonding opportunities  - Referral to  or  as needed  Outcome: Progressing  Goal: Establishment of infant feeding pattern  Description: INTERVENTIONS:  - Assess breast/bottle feeding  - Refer to lactation as needed  Outcome: Progressing  Goal: Incision(s), wounds(s) or drain site(s) healing without S/S of infection  Description: INTERVENTIONS  - Assess and document dressing, incision, wound bed, drain sites and surrounding tissue  - Provide patient and family education  - Perform skin care/dressing changes every   Outcome: Progressing

## 2023-07-18 NOTE — PLAN OF CARE
Problem: Knowledge Deficit  Goal: Verbalizes understanding of labor plan  Description: Assess patient/family/caregiver's baseline knowledge level and ability to understand information. Provide education via patient/family/caregiver's preferred learning method at appropriate level of understanding. 1. Provide teaching at level of understanding. 2. Provide teaching via preferred learning method(s). Outcome: Completed     Problem: Labor & Delivery  Goal: Manages discomfort  Description: Assess and monitor for signs and symptoms of discomfort. Assess patient's pain level regularly and per hospital policy. Administer medications as ordered. Support use of nonpharmacological methods to help control pain such as distraction, imagery, relaxation, and application of heat and cold. Collaborate with interdisciplinary team and patient to determine appropriate pain management plan. 1. Include patient in decisions related to comfort. 2. Offer non-pharmacological pain management interventions. 3. Report ineffective pain management to physician. Outcome: Completed  Goal: Patient vital signs are stable  Description: 1. Assess vital signs - vaginal delivery.   Outcome: Completed

## 2023-07-18 NOTE — DISCHARGE SUMMARY
Discharge Summary - OB/GYN   Jesús Dover 28 y.o. female MRN: [id number]  Unit/Bed#: L&D 325-01 Encounter: [id number]      Admission Date: 2023     Discharge Date: ***    Patient Active Problem List   Diagnosis   • Ex-smoker   • History of  delivery, antepartum   • COVID-19 vaccination declined   • IUGR (intrauterine growth restriction) in prior pregnancy, pregnant   •  (vaginal birth after )   • Previous  labor affecting pregnancy, antepartum   • Short cervix with cervical cerclage in third trimester, antepartum   • Insufficient prenatal care in third trimester         Discharge Diagnosis:   Same, delivered  ***    Procedures: spontaneous vaginal delivery    Delivering Attending: Marisa Graham MD  Discharge Attending: [facility] Course:     Jesús Dover is a 28 y.o. [id number] at 38w0d wks who was initially admitted for labor. She previously had a cerclage in place that was removed in the office, and the remainder of the cerclage was removed after going into labor. Her cervix was noteably scarred on the left side throughout her labor course. She received epidural for pain control followed by AROM for clear fluid. She progressed to complete and pushed. She delivered a viable female  on 23 at 0425. Weight 6lbs 10oz via spontaneous vaginal delivery. Apgars were 8 (1 min) and 8 (5 min).  was transferred to  nursery. Patient tolerated the procedure well and was transferred to recovery in stable condition. Her post-partum course was complicated by ***. Her post-partum pain was well controlled with oral analgesics. On day of discharge, she was ambulating and able to reasonably perform all ADLs. She was voiding and had appropriate bowel function. Pain was well controlled. She was discharged home on post-partum day #*** without complications.  Patient was instructed to follow up with her OB as an outpatient and was given appropriate warnings to call provider if she develops signs of infection or uncontrolled pain. Complications: none apparent    Condition at discharge: good     Discharge instructions/Information to patient and family:   See after visit summary for information provided to patient and family. Provisions for Follow-Up Care:  See after visit summary for information related to follow-up care and any pertinent home health orders. Disposition: Home    Planned Readmission: No    Discharge Medications: For a complete list of the patient's medications, please refer to her med rec.     ***  PGY-***

## 2023-07-18 NOTE — PLAN OF CARE
Problem: POSTPARTUM  Goal: Establishment of infant feeding pattern  Description: INTERVENTIONS:  - Assess breast/bottle feeding  - Refer to lactation as needed  Outcome: Progressing     Problem: POSTPARTUM  Goal: Appropriate maternal -  bonding  Description: INTERVENTIONS:  - Identify family support  - Assess for appropriate maternal/infant bonding   -Encourage maternal/infant bonding opportunities  - Referral to  or  as needed  Outcome: Progressing     Problem: POSTPARTUM  Goal: Experiences normal postpartum course  Description: INTERVENTIONS:  - Monitor maternal vital signs  - Assess uterine involution and lochia  Outcome: Progressing     Problem: POSTPARTUM  Goal: Experiences normal postpartum course  Description: INTERVENTIONS:  - Monitor maternal vital signs  - Assess uterine involution and lochia  Outcome: Progressing  Goal: Appropriate maternal -  bonding  Description: INTERVENTIONS:  - Identify family support  - Assess for appropriate maternal/infant bonding   -Encourage maternal/infant bonding opportunities  - Referral to  or  as needed  Outcome: Progressing  Goal: Establishment of infant feeding pattern  Description: INTERVENTIONS:  - Assess breast/bottle feeding  - Refer to lactation as needed  Outcome: Progressing

## 2023-07-18 NOTE — L&D DELIVERY NOTE
DELIVERY NOTE  Jose David Huntley 28 y.o. female MRN: [id number]  Unit/Bed#: L&D 325-01 Encounter: [id number]    Obstetrician:    Dr. Vahe Wilde MD    Assistant:   Dr. Meek Mcghee MD    Pre-Delivery Diagnosis:   Patient Active Problem List   Diagnosis   • Ex-smoker   • History of  delivery, antepartum   • COVID-19 vaccination declined   • IUGR (intrauterine growth restriction) in prior pregnancy, pregnant   •  (vaginal birth after )   • Previous  labor affecting pregnancy, antepartum   • Short cervix with cervical cerclage in third trimester, antepartum   • Insufficient prenatal care in third trimester     Post-Delivery Diagnosis:   Same as above - Delivered    Procedure:  Spontaneous vaginal delivery    Anesthesia:  epidural    Specimens:   Cord blood obtained   Placenta; normal appearing, eccentric insertion, intact   Arterial and venous blood gases (below)     Gases:  Umbilical Cord Venous Blood Gas:  Results from last 7 days   Lab Units 23  0424   PH COV  7.355   PCO2 COV mm HG 41.6   HCO3 COV mmol/L 22.7   BASE EXC COV mmol/L -2.7*   O2 CT CD VB mL/dL 9.2   O2 HGB, VENOUS CORD % 91.0     Umbilical Cord Arterial Blood Gas:  Results from last 7 days   Lab Units 23  0424   PH COA  7.249   PCO2 COA  57.7   PO2 COA mm HG 19.1   HCO3 COA mmol/L 24.7   BASE EXC COA mmol/L -3.5*   O2 CONTENT CORD ART ml/dl 7.6   O2 HGB, ARTERIAL CORD % 38.5       Quantitative Blood Loss:   41 mL           Complications:    None    Brief Description of Labor Course:  Jose David Huntley is a 28 y.o. [id number] at 38w0d wks who was initially admitted for labor. She previously had a cerclage in place that was removed in the office, and the remainder of the cerclage was removed after going into labor. Her cervix was noteably scarred on the left side throughout her labor course. She received epidural for pain control followed by AROM for clear fluid. She progressed to complete and pushed.  She delivered a viable female  on 23 at [address]. Weight 6lbs 10oz via spontaneous vaginal delivery. Description of Delivery:   With  the assistance of maternal expulsive forces, the fetal vertex delivered spontaneously. A nuchal cord was noted and reduced. The anterior left shoulder was delivered atraumatically with gentle downward traction. The contralateral arm was delivered with gentle upward traction and maternal expulsive forces. The remainder of the fetus delivered spontaneously at [address], resulting in a viable female . Upon delivery, the infant was placed on the mothers abdomen and the cord was doubly clamped and cut after 30 seconds. The  was noted to have good tone and cry spontaneously. There was no evidence of injury. The  was passed off to  staff for evaluation. Umbilical cord blood and umbilical artery and venous gases were collected and sent to the lab. An intact placenta was delivered spontaneously at 0429 using fundal massage and gentle cord traction and was noted to have a eccentrically-inserted 3-vessel cord. Active management of the third stage of labor was undertaken with IV pitocin at 250 milliunits/min. Inspection of the perineum, vagina, labia, cervix, and urethra revealed no laceration. Bleeding was noted to be under control. A bimanual exam was performed and fundus was boggy, and pitocin was opened wide. Her bladder was drained of 300cc of urine. Repeat bimanual exam revealed improved tone. Her cervix was noted to have moderate descensus.  Outcome:  Living  with APGARS 8 (1 min) and 8 (5 min). At the conclusion of the delivery, all needle, sponge, and instrument counts were noted to be correct. Patient tolerated the procedure well and was allowed to recover in labor and delivery room with family and  before being transferred to the post-partum floor.      Conclusion:  Mother and baby are currently recovering nicely in stable condition. Attending Supervision:   Dr. Tom Escamilla MD was present for the entire procedure.     Yasmani Olivares MD   OB/GYN PGY-2  7/18/2023 4:43 AM

## 2023-07-18 NOTE — OB LABOR/OXYTOCIN SAFETY PROGRESS
Labor Progress Note - Thomasina Hatchet 28 y.o. female MRN: [id number]    Unit/Bed#: L&D 325-01 Encounter: [id number]       Contraction Frequency (minutes): 3  Contraction Quality: Mild  Tachysystole: No   Cervical Dilation: 5        Cervical Effacement: 80  Fetal Station: -1  Baseline Rate: 145 bpm  Fetal Heart Rate: 140 BPM                  Vital Signs:   Vitals:    07/17/23 2302   BP: 113/55   Pulse: 78   Resp:    Temp:    SpO2:        Notes/comments: Starting pitocin for stalled labor.         Lyssa Machado MD 7/17/2023 11:27 PM

## 2023-07-18 NOTE — ANESTHESIA POSTPROCEDURE EVALUATION
Post-Op Assessment Note    CV Status:  Stable  Pain Score: 0    Pain management: adequate     Mental Status:  Alert and awake   Hydration Status:  Euvolemic and stable   PONV Controlled:  Controlled   Airway Patency:  Patent      Post Op Vitals Reviewed: Yes      Staff: Anesthesiologist     Post-op block assessment: site cleaned, catheter intact and no complications      No notable events documented.     BP      Temp      Pulse     Resp      SpO2      /75   Pulse 73   Temp 98.3 °F (36.8 °C) (Oral)   Resp 16   Ht 4' 11.5" (1.511 m)   Wt 56.2 kg (124 lb)   LMP 10/15/2022   SpO2 100%   Breastfeeding Unknown   BMI 24.63 kg/m²

## 2023-07-18 NOTE — OB LABOR/OXYTOCIN SAFETY PROGRESS
Oxytocin Safety Progress Check Note - Madalyn Ruiz 28 y.o. female MRN: [id number]    Unit/Bed#: L&D 325-01 Encounter: [id number]       Contraction Frequency (minutes): 4  Contraction Quality: Mild  Tachysystole: No   Cervical Dilation: 5        Cervical Effacement: 80  Fetal Station: -2  Baseline Rate: 150 bpm  Fetal Heart Rate: 140 BPM                  Vital Signs:   Vitals:    07/17/23 2034   BP: 100/58   Pulse: 91   Resp:    Temp: 98.9 °F (37.2 °C)   SpO2:        Notes/comments: Pt feeling pressure.         Iris Mckeon MD 7/17/2023 8:57 PM

## 2023-07-19 VITALS
TEMPERATURE: 97.6 F | HEART RATE: 52 BPM | WEIGHT: 124 LBS | SYSTOLIC BLOOD PRESSURE: 108 MMHG | RESPIRATION RATE: 16 BRPM | OXYGEN SATURATION: 98 % | DIASTOLIC BLOOD PRESSURE: 61 MMHG | HEIGHT: 60 IN | BODY MASS INDEX: 24.35 KG/M2

## 2023-07-19 PROBLEM — D64.9 ANEMIA: Status: ACTIVE | Noted: 2023-07-19

## 2023-07-19 PROCEDURE — 99024 POSTOP FOLLOW-UP VISIT: CPT | Performed by: OBSTETRICS & GYNECOLOGY

## 2023-07-19 RX ORDER — FERROUS SULFATE 325(65) MG
325 TABLET ORAL
Status: DISCONTINUED | OUTPATIENT
Start: 2023-07-19 | End: 2023-07-19 | Stop reason: HOSPADM

## 2023-07-19 RX ORDER — DIAPER,BRIEF,INFANT-TODD,DISP
1 EACH MISCELLANEOUS DAILY PRN
Qty: 30 G | Refills: 0
Start: 2023-07-19

## 2023-07-19 RX ORDER — IBUPROFEN 600 MG/1
600 TABLET ORAL EVERY 6 HOURS
Qty: 50 TABLET | Refills: 1 | Status: SHIPPED | OUTPATIENT
Start: 2023-07-19

## 2023-07-19 RX ORDER — FERROUS SULFATE 325(65) MG
325 TABLET ORAL
Qty: 30 TABLET | Refills: 0 | Status: SHIPPED | OUTPATIENT
Start: 2023-07-20

## 2023-07-19 RX ADMIN — FERROUS SULFATE TAB 325 MG (65 MG ELEMENTAL FE) 325 MG: 325 (65 FE) TAB at 08:56

## 2023-07-19 NOTE — PROGRESS NOTES
Progress Note - OB/GYN   Nadege Novak 28 y.o. female MRN: [id number]  Unit/Bed#: L&D 483-45 Encounter: [id number]      Assessment/Plan    Nadege Novak is a 28 y.o. [id number] who is PPD 1 s/p  at 38w0d     *  (vaginal birth after )  Assessment & Plan  • Routine postpartum care  • Encourage ambulation  • Encourage familial bonding  • Lactation support as needed, she is bottle feeding  • Pain: Motrin/Tylenol around the clock  • Postpartum Contraceptive plan: tubal, will f/u outpatient for surgical planning  • Pate catheter: removed at delivery, voiding spontaneously  • DVT Ppx: ambulation            Anemia  Assessment & Plan  Admission hemoglobin 9.5, Venofer ordered    Insufficient prenatal care in third trimester  Assessment & Plan  Many no-shows throughout pregnancy  UDS collected on admission, negative         Disposition: Anticipate discharge home postpartum Day #1 if baby cleared to go  Barriers to discharge: ongoing couplet care       Subjective/Objective     Subjective: Postpartum state - doing well    Pain: no  Tolerating PO: yes  Voiding: yes  Flatus: yes  BM: not yet  Ambulating: yes  Breastfeeding: Bottle feeding  Chest pain: no  Shortness of breath: no  Leg pain: no  Lochia: minimal    Objective:     Vitals:  Vitals:    23 1900 23 2300 23 0009 23 0300   BP: 90/54 (!) 96/49 96/50 90/56   BP Location: Left arm Left arm  Right arm   Pulse: 84 63  85   Resp: 18 16  18   Temp: 99.3 °F (37.4 °C) 98 °F (36.7 °C)  (!) 97 °F (36.1 °C)   TempSrc: Temporal Temporal  Temporal   SpO2:       Weight:       Height:           Physical Exam:   GEN: appears well, alert and oriented x 3, pleasant and cooperative   CV: Regular rate  RESP: non labored breathing  ABDOMEN: soft, no tenderness, no distention, Uterine fundus firm and non-tender, -1 cm below the umbilicus  EXTREMITIES: non-tender  NEURO Alert and oriented to person, place, and time.        Lab Results   Component Value Date    WBC 8.90 07/17/2023    HGB 9.5 (L) 07/17/2023    HCT 30.7 (L) 07/17/2023    MCV 86 07/17/2023     07/17/2023         Abner Sahu DO  Obstetrics & Gynecology  07/19/23

## 2023-07-19 NOTE — UTILIZATION REVIEW
NOTIFICATION OF INPATIENT ADMISSION   MATERNITY/DELIVERY AUTHORIZATION REQUEST   SERVICING FACILITY:   [address] [place] [address] - L&D, [place], NICU  [facility], [address] [place] [address]  Tax ID: [id number]  NPI: [id number] ATTENDING PROVIDER:  Attending Name and NPI#: Santiago Kaufman Md [[id number]]  Address: [facility], [address] [place] [address]  Phone: [phone]     ADMISSION INFORMATION:  Place of Service: [address] [place] [address] Code: 21  Inpatient Admission Date/Time: 23  2:04 PM  Discharge Date/Time: 2023 11:05 AM  Admitting Diagnosis Code/Description:  Uterine contractions at greater than 20 weeks of gestation [O47.9]     Mother: Katelyn Harris 1995 Estimated Date of Delivery: 23  Delivering clinician: Santiago Kaufman    OB History        8    Para   4    Term   2       2    AB   4    Living   4       SAB   2    IAB   2    Ectopic        Multiple   0    Live Births   4           Obstetric Comments   Menarche 8  Irregular menses            Name & MRN:   Information for the patient's :  Edwin Carmichael Girl Sofya Mcpherson) [[id number]]     [place] Delivery Information:  Sex: female  Delivered 2023 4:25 AM by Vaginal, Spontaneous; Gestational Age: 42w0d     Measurements:  Weight: 6 lb 10.2 oz (3010 g); Height: 20"    APGAR 1 minute 5 minutes 10 minutes   Totals: 8 8      [place] Birth Information: 28 y.o. female MRN: [id number] Unit/Bed#: L&D 314-01   Birthweight: No birth weight on file. Gestational Age: <None> Delivery Type:    APGARS Totals:        UTILIZATION REVIEW CONTACT:  Narda Barrett Utilization   Network Utilization Review Department  Phone: [phone]  Fax [phone]  Email: [email or web address]  Contact for approvals/pending authorizations, clinical reviews, and discharge.      PHYSICIAN ADVISORY SERVICES:  Medical Necessity Denial & [facility]rg-Nvqq Review  Phone: [phone]  Fax: [phone]  Email: [email or web address]

## 2023-07-19 NOTE — NURSING NOTE
IV iron ordered for pt with admitting hgb of 9.5. Her IV was already taken out. One successful attempt was made to place an IV. Pt requested if she could not have an IV and take iron PO. RN reached out to DR Sarkis Angelo and she agreed. Informed pt about plan. Awaiting orders.

## 2023-07-19 NOTE — NURSING NOTE
Maternal and  discharge teaching done. Pamphlet and Save Your Life magnet given and reviewed. All questions answered. Encouraged pt to reach out with any more questions.

## 2023-07-20 DIAGNOSIS — Z78.9 NEED FOR FOLLOW-UP BY SOCIAL WORKER: Primary | ICD-10-CM

## 2023-07-21 ENCOUNTER — PATIENT OUTREACH (OUTPATIENT)
Dept: FAMILY MEDICINE CLINIC | Facility: CLINIC | Age: 28
End: 2023-07-21

## 2023-07-21 NOTE — PROGRESS NOTES
TESSA WEAVER received HRR referral due to frequent hospitalizations related to patient's pregnancy. TESSA WEAVER placed call to the patient, Geovany Gurrola who advised gave birth on Tuesday to a baby girl, Dorothea Sousa. She explained she was having complications during the pregnancy and had abdominal pain. She knew something was wrong. TESSA WEAVER provided positive feedback regarding advocating for self. Geovany Gurrola reported that both she and the baby are doing well. The baby is currently napping. She is feeling normal again. Geovany Gurrola is  and her  is the father of the baby. She described him as a good support. He stayed throughout the whole delivery and slept in the hopsital bed with her. They have family and friends for support. Geovany Gurrola has 3 other children. Her 2 oldest children are in [address] [place] [address] with their father. They facetimed with them and they seemed excited to have a new sibling and look forward to meeting her. Her stepdaughter is a toddler and has been excited as well. Geovany Gurrola reported having adequate baby supplies. At this time she has not been able to get [facility] office on [facility]. She has been calling everyday every day at different times and always gets the answering machine. She has been able to get formula in the meantime. If she continues to not get ahold of them her  will stay home with the children while she goes in person to their office. TESSA WEAVER and Geovany Gurrola discussed sleep quality. The first night she did not get a lot of sleep as the baby woke up every hour so she just held her often to calm her. It has gotten better. The baby slept 3 hours at time last night. Geovany Gurrola has not experienced post partum depression in past with other children. She is aware to contact her OBGYN office if does start to show signs. Geovany Gurrola is currently on maternity leave and is unsure of how long she will stay out of work.  Her  is currently employed and she reported financial security at this time. SW CM will close referral as patient declined any needs at this time. Patient is aware to contact office or SW CM directly as needs arise. SW CM will remain available for future psychosocial support as needed.

## 2023-07-26 LAB — PLACENTA IN STORAGE: NORMAL

## 2023-08-30 ENCOUNTER — TELEPHONE (OUTPATIENT)
Dept: OBGYN CLINIC | Facility: CLINIC | Age: 28
End: 2023-08-30

## 2023-09-14 ENCOUNTER — POSTPARTUM VISIT (OUTPATIENT)
Dept: OBGYN CLINIC | Facility: CLINIC | Age: 28
End: 2023-09-14
Payer: COMMERCIAL

## 2023-09-14 ENCOUNTER — PREP FOR PROCEDURE (OUTPATIENT)
Dept: OBGYN CLINIC | Facility: CLINIC | Age: 28
End: 2023-09-14

## 2023-09-14 VITALS
SYSTOLIC BLOOD PRESSURE: 96 MMHG | DIASTOLIC BLOOD PRESSURE: 60 MMHG | WEIGHT: 110 LBS | HEIGHT: 60 IN | BODY MASS INDEX: 21.6 KG/M2

## 2023-09-14 DIAGNOSIS — R19.7 DIARRHEA, UNSPECIFIED TYPE: ICD-10-CM

## 2023-09-14 DIAGNOSIS — Z01.818 PRE-OP EXAM: Primary | ICD-10-CM

## 2023-09-14 DIAGNOSIS — Z30.019 ENCOUNTER FOR FEMALE BIRTH CONTROL: ICD-10-CM

## 2023-09-14 DIAGNOSIS — Z30.2 REQUEST FOR STERILIZATION: Primary | ICD-10-CM

## 2023-09-14 DIAGNOSIS — Z30.2 REQUEST FOR STERILIZATION: ICD-10-CM

## 2023-09-14 RX ORDER — SODIUM CHLORIDE 9 MG/ML
125 INJECTION, SOLUTION INTRAVENOUS CONTINUOUS
Status: CANCELLED | OUTPATIENT
Start: 2023-09-21

## 2023-09-14 RX ORDER — NORGESTIMATE AND ETHINYL ESTRADIOL 0.25-0.035
1 KIT ORAL DAILY
Qty: 28 TABLET | Refills: 3 | Status: SHIPPED | OUTPATIENT
Start: 2023-09-14 | End: 2023-10-12

## 2023-09-14 NOTE — PROGRESS NOTES
H&P Exam  Chepe Lemon 28 y.o. female MRN: [id number]  Unit/Bed#:  Encounter: [id number]      HPI:  Chepe Lemon is a 28 y.o. [id number] female who will be undergoing an EUA, dx laparoscopy and bilateral salpingectomy. She recently had a vaginal delivery on  complicated by uterine atony. She is doing well overall since delivery although she is having some bouts of diarrhea. She request permanent sterilization as she is done child bearing. LMP: 23  Contraception: OCP's  Last pap smear: , NILM/HPV neg    Blood use during admission if needed: yes  CPR during admission if needed: yes    OB History    Para Term  AB Living   8 4 2 2 4 4   SAB IAB Ectopic Multiple Live Births   2 2   0 4      # Outcome Date GA Lbr Mo/2nd Weight Sex Delivery Anes PTL Lv   8 Term 23 38w0d / 00:09 3010 g (6 lb 10.2 oz) F Vag-Spont EPI  OLLIE   7 Term 22 38w2d / 00:27 2545 g (5 lb 9.8 oz) M Vag-Spont EPI N OLLIE   6 SAB 2020     SAB      5 SAB 19 8w0d    SAB  N       Birth Comments: had D and C   4 IAB 2018           3  17 33w0d  2080 g (4 lb 9.4 oz) M CS-LTranv Spinal Y OLLIE      Birth Comments: Fetal bradycardia per [facility] chart (pre noted "Had anxiety attack affecting fetal status, had . ")      Complications: Fetal Intolerance   2 IAB 06/01/15 6w0d          1  14 34w3d  2080 g (4 lb 9.4 oz) F Vag-Spont EPI Y OLLIE      Birth Comments: Prolonged PPROM ( admitted 1/3) [email or web address], NICU x 12d, feeding/weight gain      Complications: Premature rupture of membranes during pregnancy, delivered, Rupture of membranes with delay of delivery, Small for gestational age infant      Obstetric Comments   Menarche 8   Irregular menses       Review of Systems   Constitutional: Negative for chills and fever. Respiratory: Negative for cough and shortness of breath. Cardiovascular: Negative for chest pain. Gastrointestinal: Positive for diarrhea.  Negative for abdominal pain, constipation, nausea and vomiting. Genitourinary: Negative for dysuria, hematuria, vaginal bleeding and vaginal discharge. Neurological: Negative for light-headedness, numbness and headaches. Historical Information   Past Medical History:   Diagnosis Date   • Anemia    • Former smoker     Stop smoking 2020   • Miscarriage     x 2   • Varicella     vaccine     Past Surgical History:   Procedure Laterality Date   •  SECTION  2017    Patient states anxiety attack which affected fetal tracing necessitating . • DILATION AND EVACUATION  2016    Elective termination   • DILATION AND EVACUATION  2018    Persistent Missed pregnancy   • MI CERCLAGE UTERINE CERVIX NONOBSTETRICAL N/A 3/16/2023    Procedure: CERCLAGE CERVICAL;  Surgeon: Kris Eduardo MD;  [facility];  Service: Gynecology   • MI [place] MISSED  FIRST TRIMESTER SURGICAL N/A 2020    Procedure: DILATATION AND EVACUATION (D&E) ( 8 OF WEEKS); Surgeon: Jojo Banks MD;  Location: [facility];  Service: Gynecology   • WISDOM TOOTH EXTRACTION       Social History   Social History     Substance and Sexual Activity   Alcohol Use Not Currently     Social History     Substance and Sexual Activity   Drug Use No     Social History     Tobacco Use   Smoking Status Former   • Types: Cigarettes   Smokeless Tobacco Never   Tobacco Comments    quit 2020     Family History: No history of breast, ovarian, uterine, cervical cancer. Meds/Allergies    (Not in a hospital admission)       Allergies   Allergen Reactions   • Penicillins Hives       OBJECTIVE:    Vitals: Blood pressure 96/60, height 4' 11.5" (1.511 m), weight 49.9 kg (110 lb), last menstrual period 2023, not currently breastfeeding. Body mass index is 21.85 kg/m². Physical Exam  Vitals reviewed. Constitutional:       Appearance: Normal appearance. HENT:      Head: Normocephalic.    Cardiovascular:      Rate and Rhythm: Normal rate and regular rhythm. Pulmonary:      Effort: Pulmonary effort is normal.   Abdominal:      General: There is no distension. Palpations: Abdomen is soft. Tenderness: There is no abdominal tenderness. Musculoskeletal:         General: No swelling or tenderness. Skin:     General: Skin is warm and dry. Neurological:      Mental Status: She is alert and oriented to person, place, and time. Assessment/Plan     ASSESSMENT:  29yo  female who is requesting permanent sterilization    PLAN:  Request for sterilization    Pt signed MA-31 in 2023    Consent form signed for EUA, dx laparoscopy, and bilateral salpingectomy    The patient was counseled regarding indications, risks, benefits and alternatives to surgical management. We discussed risks including but not limited to bleeding and potential need for blood transfusions, infection, pain, injury to surrounding organs such as bladder, intestines, ureters and neurovascular structures. Patient understands potential risks associated with stress of surgery and general anesthesia including but not limited to acute cardiac events, venous thromboembolism, etc.  All questions answered to patient's satisfaction. Patient agrees and wants to proceed.     NPO after midnight  No surgical prophylaxis    Will call patient with surgical date, prescription given for OCP's while awaiting surgery    Maxwell Salas MD  2023  10:10 AM

## 2023-09-14 NOTE — H&P (VIEW-ONLY)
H&P Exam  Rama Blackmon 28 y.o. female MRN: [id number]  Unit/Bed#:  Encounter: [id number]      HPI:  Rama Blackmon is a 28 y.o. [id number] female who will be undergoing an EUA, dx laparoscopy and bilateral salpingectomy. She recently had a vaginal delivery on 8/31/10 complicated by uterine atony. She is doing well overall since delivery although she is having some bouts of diarrhea. She request permanent sterilization as she is done child bearing. LMP: 23  Contraception: OCP's  Last pap smear: , NILM/HPV neg    Blood use during admission if needed: yes  CPR during admission if needed: yes    OB History    Para Term  AB Living   8 4 2 2 4 4   SAB IAB Ectopic Multiple Live Births   2 2   0 4      # Outcome Date GA Lbr Mo/2nd Weight Sex Delivery Anes PTL Lv   8 Term 23 38w0d / 00:09 3010 g (6 lb 10.2 oz) F Vag-Spont EPI  OLLIE   7 Term 22 38w2d / 00:27 2545 g (5 lb 9.8 oz) M Vag-Spont EPI N OLLIE   6 SAB      SAB      5 SAB 19 8w0d    SAB  N       Birth Comments: had D and C   4 IAB 2018           3  17 33w0d  2080 g (4 lb 9.4 oz) M CS-LTranv Spinal Y OLLIE      Birth Comments: Fetal bradycardia per [facility] chart (pre noted "Had anxiety attack affecting fetal status, had . ")      Complications: Fetal Intolerance   2 IAB 06/01/15 6w0d          1  14 34w3d  2080 g (4 lb 9.4 oz) F Vag-Spont EPI Y OLLIE      Birth Comments: Prolonged PPROM ( admitted 1/3) [email or web address], NICU x 12d, feeding/weight gain      Complications: Premature rupture of membranes during pregnancy, delivered, Rupture of membranes with delay of delivery, Small for gestational age infant      Obstetric Comments   Menarche 8   Irregular menses       Review of Systems   Constitutional: Negative for chills and fever. Respiratory: Negative for cough and shortness of breath. Cardiovascular: Negative for chest pain. Gastrointestinal: Positive for diarrhea.  Negative for abdominal pain, constipation, nausea and vomiting. Genitourinary: Negative for dysuria, hematuria, vaginal bleeding and vaginal discharge. Neurological: Negative for light-headedness, numbness and headaches. Historical Information   Past Medical History:   Diagnosis Date   • Anemia    • Former smoker     Stop smoking 2020   • Miscarriage     x 2   • Varicella     vaccine     Past Surgical History:   Procedure Laterality Date   •  SECTION  2017    Patient states anxiety attack which affected fetal tracing necessitating . • DILATION AND EVACUATION  2016    Elective termination   • DILATION AND EVACUATION  2018    Persistent Missed pregnancy   • MS CERCLAGE UTERINE CERVIX NONOBSTETRICAL N/A 3/16/2023    Procedure: CERCLAGE CERVICAL;  Surgeon: April Minor MD;  [facility];  Service: Gynecology   • [place] MISSED  FIRST TRIMESTER SURGICAL N/A 2020    Procedure: DILATATION AND EVACUATION (D&E) ( 8 OF WEEKS); Surgeon: Lin Andrade MD;  Location: [facility];  Service: Gynecology   • WISDOM TOOTH EXTRACTION       Social History   Social History     Substance and Sexual Activity   Alcohol Use Not Currently     Social History     Substance and Sexual Activity   Drug Use No     Social History     Tobacco Use   Smoking Status Former   • Types: Cigarettes   Smokeless Tobacco Never   Tobacco Comments    quit 2020     Family History: No history of breast, ovarian, uterine, cervical cancer. Meds/Allergies    (Not in a hospital admission)       Allergies   Allergen Reactions   • Penicillins Hives       OBJECTIVE:    Vitals: Blood pressure 96/60, height 4' 11.5" (1.511 m), weight 49.9 kg (110 lb), last menstrual period 2023, not currently breastfeeding. Body mass index is 21.85 kg/m². Physical Exam  Vitals reviewed. Constitutional:       Appearance: Normal appearance. HENT:      Head: Normocephalic.    Cardiovascular:      Rate and Rhythm: Normal rate and regular rhythm. Pulmonary:      Effort: Pulmonary effort is normal.   Abdominal:      General: There is no distension. Palpations: Abdomen is soft. Tenderness: There is no abdominal tenderness. Musculoskeletal:         General: No swelling or tenderness. Skin:     General: Skin is warm and dry. Neurological:      Mental Status: She is alert and oriented to person, place, and time. Assessment/Plan     ASSESSMENT:  31yo  female who is requesting permanent sterilization    PLAN:  Request for sterilization    Pt signed MA-31 in 2023    Consent form signed for EUA, dx laparoscopy, and bilateral salpingectomy    The patient was counseled regarding indications, risks, benefits and alternatives to surgical management. We discussed risks including but not limited to bleeding and potential need for blood transfusions, infection, pain, injury to surrounding organs such as bladder, intestines, ureters and neurovascular structures. Patient understands potential risks associated with stress of surgery and general anesthesia including but not limited to acute cardiac events, venous thromboembolism, etc.  All questions answered to patient's satisfaction. Patient agrees and wants to proceed.     NPO after midnight  No surgical prophylaxis    Will call patient with surgical date, prescription given for OCP's while awaiting surgery    Shraddha Gayle MD  2023  10:10 AM

## 2023-09-18 NOTE — PRE-PROCEDURE INSTRUCTIONS
No outpatient medications have been marked as taking for the 9/21/23 encounter FRANCISCO JAVIER [facility] HOSPITAL Encounter). Pt denies taking any medication, vitamins/supplements    . Medication instructions for day surgery reviewed. Please use only a sip of water to take your instructed medications. Avoid all over the counter vitamins, supplements and NSAIDS for one week prior to surgery per anesthesia guidelines. Tylenol is ok to take as needed. You will receive a call one business day prior to surgery with an arrival time and hospital directions. If your surgery is scheduled on a Monday, the hospital will be calling you on the Friday prior to your surgery. If you have not heard from anyone by 8pm, please call the hospital supervisor through the hospital  at [phone]. Abelardo Joe [phone]). Do not eat or drink anything after midnight the night before your surgery, including candy, mints, lifesavers, or chewing gum. Do not drink alcohol 24hrs before your surgery. Try not to smoke at least 24hrs before your surgery. Follow the pre surgery showering instructions as listed in the [facility] Surgical Experience Booklet” or otherwise provided by your surgeon's office. Do not shave the surgical area 24 hours before surgery. Do not apply any lotions, creams, including makeup, cologne, deodorant, or perfumes after showering on the day of your surgery. No contact lenses, eye make-up, or artificial eyelashes. Remove nail polish, including gel polish, and any artificial, gel, or acrylic nails if possible. Remove all jewelry including rings and body piercing jewelry. Wear causal clothing that is easy to take on and off. Consider your type of surgery. Keep any valuables, jewelry, piercings at home. Please bring any specially ordered equipment (sling, braces) if indicated. Arrange for a responsible person to drive you to and from the hospital on the day of your surgery. Visitor Guidelines discussed.      Call the surgeon's office with any new illnesses, exposures, or additional questions prior to surgery. Please reference your [facility] Surgical Experience Booklet” for additional information to prepare for your upcoming surgery.

## 2023-09-20 ENCOUNTER — ANESTHESIA EVENT (OUTPATIENT)
Dept: PERIOP | Facility: HOSPITAL | Age: 28
End: 2023-09-20
Payer: COMMERCIAL

## 2023-09-21 ENCOUNTER — ANESTHESIA (OUTPATIENT)
Dept: PERIOP | Facility: HOSPITAL | Age: 28
End: 2023-09-21
Payer: COMMERCIAL

## 2023-09-21 ENCOUNTER — HOSPITAL ENCOUNTER (OUTPATIENT)
Facility: HOSPITAL | Age: 28
Setting detail: OUTPATIENT SURGERY
Discharge: HOME | End: 2023-09-21
Attending: OBSTETRICS & GYNECOLOGY | Admitting: OBSTETRICS & GYNECOLOGY
Payer: COMMERCIAL

## 2023-09-21 VITALS
OXYGEN SATURATION: 100 % | SYSTOLIC BLOOD PRESSURE: 104 MMHG | TEMPERATURE: 97.6 F | BODY MASS INDEX: 21.89 KG/M2 | WEIGHT: 110.23 LBS | RESPIRATION RATE: 16 BRPM | HEART RATE: 84 BPM | DIASTOLIC BLOOD PRESSURE: 54 MMHG

## 2023-09-21 DIAGNOSIS — Z30.2 REQUEST FOR STERILIZATION: ICD-10-CM

## 2023-09-21 PROBLEM — Z90.79 STATUS POST BILATERAL SALPINGECTOMY: Status: ACTIVE | Noted: 2023-09-21

## 2023-09-21 PROBLEM — Z87.891 EX-SMOKER: Status: RESOLVED | Noted: 2020-12-30 | Resolved: 2023-09-21

## 2023-09-21 LAB
ERYTHROCYTE [DISTWIDTH] IN BLOOD BY AUTOMATED COUNT: 20.3 % (ref 11.6–15.1)
EXT PREGNANCY TEST URINE: NEGATIVE
EXT. CONTROL: NORMAL
HCT VFR BLD AUTO: 37.2 % (ref 34.8–46.1)
HGB BLD-MCNC: 12.1 G/DL (ref 11.5–15.4)
MCH RBC QN AUTO: 27.8 PG (ref 26.8–34.3)
MCHC RBC AUTO-ENTMCNC: 32.5 G/DL (ref 31.4–37.4)
MCV RBC AUTO: 86 FL (ref 82–98)
PLATELET # BLD AUTO: 264 THOUSANDS/UL (ref 149–390)
PMV BLD AUTO: 10.8 FL (ref 8.9–12.7)
RBC # BLD AUTO: 4.35 MILLION/UL (ref 3.81–5.12)
WBC # BLD AUTO: 8.08 THOUSAND/UL (ref 4.31–10.16)

## 2023-09-21 PROCEDURE — 81025 URINE PREGNANCY TEST: CPT | Performed by: OBSTETRICS & GYNECOLOGY

## 2023-09-21 PROCEDURE — 85027 COMPLETE CBC AUTOMATED: CPT | Performed by: OBSTETRICS & GYNECOLOGY

## 2023-09-21 PROCEDURE — 88302 TISSUE EXAM BY PATHOLOGIST: CPT | Performed by: PATHOLOGY

## 2023-09-21 RX ORDER — KETOROLAC TROMETHAMINE 30 MG/ML
INJECTION, SOLUTION INTRAMUSCULAR; INTRAVENOUS AS NEEDED
Status: DISCONTINUED | OUTPATIENT
Start: 2023-09-21 | End: 2023-09-21

## 2023-09-21 RX ORDER — LIDOCAINE HYDROCHLORIDE 20 MG/ML
INJECTION, SOLUTION EPIDURAL; INFILTRATION; INTRACAUDAL; PERINEURAL AS NEEDED
Status: DISCONTINUED | OUTPATIENT
Start: 2023-09-21 | End: 2023-09-21

## 2023-09-21 RX ORDER — SODIUM CHLORIDE 9 MG/ML
125 INJECTION, SOLUTION INTRAVENOUS CONTINUOUS
Status: DISCONTINUED | OUTPATIENT
Start: 2023-09-21 | End: 2023-09-21 | Stop reason: HOSPADM

## 2023-09-21 RX ORDER — ACETAMINOPHEN 325 MG/1
975 TABLET ORAL EVERY 6 HOURS PRN
Status: DISCONTINUED | OUTPATIENT
Start: 2023-09-21 | End: 2023-09-21 | Stop reason: HOSPADM

## 2023-09-21 RX ORDER — DEXAMETHASONE SODIUM PHOSPHATE 10 MG/ML
INJECTION, SOLUTION INTRAMUSCULAR; INTRAVENOUS AS NEEDED
Status: DISCONTINUED | OUTPATIENT
Start: 2023-09-21 | End: 2023-09-21

## 2023-09-21 RX ORDER — FENTANYL CITRATE 50 UG/ML
INJECTION, SOLUTION INTRAMUSCULAR; INTRAVENOUS AS NEEDED
Status: DISCONTINUED | OUTPATIENT
Start: 2023-09-21 | End: 2023-09-21

## 2023-09-21 RX ORDER — PROPOFOL 10 MG/ML
INJECTION, EMULSION INTRAVENOUS AS NEEDED
Status: DISCONTINUED | OUTPATIENT
Start: 2023-09-21 | End: 2023-09-21

## 2023-09-21 RX ORDER — ONDANSETRON 2 MG/ML
INJECTION INTRAMUSCULAR; INTRAVENOUS AS NEEDED
Status: DISCONTINUED | OUTPATIENT
Start: 2023-09-21 | End: 2023-09-21

## 2023-09-21 RX ORDER — BUPIVACAINE HYDROCHLORIDE 2.5 MG/ML
INJECTION, SOLUTION EPIDURAL; INFILTRATION; INTRACAUDAL AS NEEDED
Status: DISCONTINUED | OUTPATIENT
Start: 2023-09-21 | End: 2023-09-21 | Stop reason: HOSPADM

## 2023-09-21 RX ORDER — MIDAZOLAM HYDROCHLORIDE 2 MG/2ML
INJECTION, SOLUTION INTRAMUSCULAR; INTRAVENOUS AS NEEDED
Status: DISCONTINUED | OUTPATIENT
Start: 2023-09-21 | End: 2023-09-21

## 2023-09-21 RX ORDER — FENTANYL CITRATE/PF 50 MCG/ML
25 SYRINGE (ML) INJECTION
Status: DISCONTINUED | OUTPATIENT
Start: 2023-09-21 | End: 2023-09-21 | Stop reason: HOSPADM

## 2023-09-21 RX ORDER — ONDANSETRON 2 MG/ML
4 INJECTION INTRAMUSCULAR; INTRAVENOUS ONCE AS NEEDED
Status: DISCONTINUED | OUTPATIENT
Start: 2023-09-21 | End: 2023-09-21 | Stop reason: HOSPADM

## 2023-09-21 RX ORDER — MAGNESIUM HYDROXIDE 1200 MG/15ML
LIQUID ORAL AS NEEDED
Status: DISCONTINUED | OUTPATIENT
Start: 2023-09-21 | End: 2023-09-21 | Stop reason: HOSPADM

## 2023-09-21 RX ORDER — ROCURONIUM BROMIDE 10 MG/ML
INJECTION, SOLUTION INTRAVENOUS AS NEEDED
Status: DISCONTINUED | OUTPATIENT
Start: 2023-09-21 | End: 2023-09-21

## 2023-09-21 RX ORDER — IBUPROFEN 600 MG/1
600 TABLET ORAL EVERY 6 HOURS PRN
Status: DISCONTINUED | OUTPATIENT
Start: 2023-09-21 | End: 2023-09-21 | Stop reason: HOSPADM

## 2023-09-21 RX ADMIN — SUGAMMADEX 200 MG: 100 INJECTION, SOLUTION INTRAVENOUS at 13:48

## 2023-09-21 RX ADMIN — MIDAZOLAM 2 MG: 1 INJECTION INTRAMUSCULAR; INTRAVENOUS at 12:52

## 2023-09-21 RX ADMIN — LIDOCAINE HYDROCHLORIDE 80 MG: 20 INJECTION, SOLUTION EPIDURAL; INFILTRATION; INTRACAUDAL at 13:08

## 2023-09-21 RX ADMIN — FENTANYL CITRATE 100 MCG: 50 INJECTION INTRAMUSCULAR; INTRAVENOUS at 13:02

## 2023-09-21 RX ADMIN — ROCURONIUM BROMIDE 30 MG: 10 INJECTION, SOLUTION INTRAVENOUS at 13:09

## 2023-09-21 RX ADMIN — KETOROLAC TROMETHAMINE 30 MG: 30 INJECTION, SOLUTION INTRAMUSCULAR; INTRAVENOUS at 13:48

## 2023-09-21 RX ADMIN — IBUPROFEN 600 MG: 600 TABLET, FILM COATED ORAL at 15:42

## 2023-09-21 RX ADMIN — DEXAMETHASONE SODIUM PHOSPHATE 10 MG: 10 INJECTION INTRAMUSCULAR; INTRAVENOUS at 13:24

## 2023-09-21 RX ADMIN — PROPOFOL 180 MG: 10 INJECTION, EMULSION INTRAVENOUS at 13:08

## 2023-09-21 RX ADMIN — ONDANSETRON 4 MG: 2 INJECTION INTRAMUSCULAR; INTRAVENOUS at 13:24

## 2023-09-21 RX ADMIN — SODIUM CHLORIDE 125 ML/HR: 0.9 INJECTION, SOLUTION INTRAVENOUS at 11:52

## 2023-09-21 NOTE — OP NOTE
OPERATIVE REPORT  PATIENT NAME: Candido Milan    :  1995  MRN: [id number]  Pt Location: [facility] OR ROOM 06    SURGERY DATE: 2023    Surgeon(s) and Role:     * Collin Byers MD - Primary     * Letitia Cisneros MD - Assisting    Preop Diagnosis:  Request for sterilization [Z30.2]      Post-Op Diagnosis Codes:     * Request for sterilization [Z30.2]    Procedure(s):  Bilateral - SALPINGECTOMY. LAPAROSCOPIC    Specimen(s):  ID Type Source Tests Collected by Time Destination   1 :  Tissue Fallopian Tubes, Bilateral TISSUE EXAM Collin Byers MD 2023 [id number]        Estimated Blood Loss:   5.0 ml     Drains:  NG/OG/Enteral Tube Orogastric 18 Fr Center mouth (Active)   Number of days: 0       [REMOVED] Urethral Catheter Latex 16 Fr. (Removed)   Number of days: 0       Anesthesia Type:   General endotracheal    Operative Indications:  Request for sterilization [Z30.2]      Operative Findings:  Grossly normal external genitalia   Grossly normal vaginal mucosa and cervix   Grossly normal uterus, fallopian tubes and ovaries bilaterally   Grossly normal liver edge and bowel    Complications:   None    Procedure and Technique:      Brief History    All risks, benefits, and alternatives to the procedure were discussed with the patient and she had the opportunity to ask questions. The risk of regret of procedure was also addressed with the patient and options for LARC was discussed. Patient expressed desire to continue with tubal sterilization. Informed consent was obtained. Description of Procedure    Patient was taken to the operating room were a time out was performed to confirm correct patient and correct procedure. General endotracheal anesthesia (GET) was administered and the patient was positioned on the OR table in the dorsal lithotomy position. All pressure points were padded and a warm blanket was placed to maintain control of core body temperature.  A bimanual exam was performed and the uterus was noted to be retroverted, normal in size and consistency with no palpable adnexal masses or fullness. The patient was prepped and draped in the usual sterile fashion with chloroprep on the abdomen and chlorohexadene prep on the vagina and perineum. Operative Technique    A Pate catheter was introduced into the bladder. A sponge stick was placed inside the vagina for the purposes of uterine manipulations. Sterile gloves were then exchanged and attention was turned to the abdomen. A 5 mm incision was made at the inferior edge of the umbilicus for introduction of a 5 mm trocar. Trocar was introduced under direct visualization. Pneumoperitoneum was then established to a maximum of 15mmHg. The entire abdomen and pelvis was inspected and there was no evidence of injury to bowel, bladder, vasculature, or other structures. Attention was then turned to the pelvis. Patient was placed in Trendelenburg and the uterus was elevated to visualize the fallopian tubes. There was noted to be grossly normal tubes and ovaries bilaterally. Two additional port sites were selected in the left and right lower abdomen approximately 2cm superior and medial to the iliac crests. A 5mm incision was made for introduction of a 5mm trocar under direct visualization at each site. A blunt probe was inserted through this port and used to visualize the fimbriated ends of the tubes. The left fallopian tube was grasped at its fimbriated end with a blunt grasper and elevated to visualize the mesosalpinx. Enseal device was used to ligate along the mesosalpinx, working proximally and taking care to avoid ovarian vasculature. Approximately 2 cm from the cornua, Enseal  was used to amputate fallopian tube. This was then withdrawn from the abdominal cavity and sent for pathology. Attention was then turned to the contralateral tube, which was amputated in similar fashion. Good hemostasis was confirmed following salpingectomy.     Following salpingectomy, pneumoperitoneum was allowed to escape. Adequate hemostasis was visualized. The inferior trocars were removed under direct visualization. The laparoscope was withdrawn from the abdomen, followed by its trocar sleeve at the umbilicus. Skin incisions were closed with interrupted fast absorbing  4-0 monocryl sutures. Attention was turned to the vagina. The sponge stick was then removed from the vagina and napier balloon were removed. At the conclusion of the procedure, all needle, sponge, and instrument counts were noted to be correct x2. Patient tolerated the procedure well and was transferred to PACU in stable condition prior to discharge with follow up in 1-2 weeks. Dr. Malka Rivera was present and participated in all key portions of the case.     Patient Disposition:  PACU         SIGNATURE: Robert Amor MD  DATE: September 21, 2023  TIME: 2:02 PM

## 2023-09-21 NOTE — ANESTHESIA POSTPROCEDURE EVALUATION
Post-Op Assessment Note    CV Status:  Stable    Pain management: adequate     Mental Status:  Alert and awake   Hydration Status:  Euvolemic   PONV Controlled:  Controlled   Airway Patency:  Patent      Post Op Vitals Reviewed: Yes      Staff: Anesthesiologist         No notable events documented.     BP      Temp      Pulse     Resp      SpO2      /54   Pulse 69   Temp 97.5 °F (36.4 °C) (Temporal)   Resp 15   Wt 50 kg (110 lb 3.7 oz)   LMP 09/08/2023 (Exact Date)   SpO2 97%   Breastfeeding No   BMI 21.89 kg/m²

## 2023-09-21 NOTE — INTERVAL H&P NOTE
H&P reviewed. After examining the patient I find no changes in the patients condition since the H&P had been written.     Vitals:    09/21/23 1120   BP: 97/57   Pulse: 60   Resp: 16   Temp: 97.5 °F (36.4 °C)   SpO2: 100%

## 2023-09-25 PROCEDURE — 88302 TISSUE EXAM BY PATHOLOGIST: CPT | Performed by: PATHOLOGY

## (undated) DEVICE — DRAPE EQUIPMENT RF WAND

## (undated) DEVICE — GLOVE INDICATOR PI UNDERGLOVE SZ 8 BLUE

## (undated) DEVICE — GLOVE INDICATOR PI UNDERGLOVE SZ 7.5 BLUE

## (undated) DEVICE — PVC URETHRAL CATHETER: Brand: DOVER

## (undated) DEVICE — CHLORAPREP HI-LITE 26ML ORANGE

## (undated) DEVICE — INTENDED FOR TISSUE SEPARATION, AND OTHER PROCEDURES THAT REQUIRE A SHARP SURGICAL BLADE TO PUNCTURE OR CUT.: Brand: BARD-PARKER SAFETY BLADES SIZE 11, STERILE

## (undated) DEVICE — BETHLEHEM UNIVERSAL MINOR VAG: Brand: CARDINAL HEALTH

## (undated) DEVICE — GLOVE PI ULTRA TOUCH SZ.8.0

## (undated) DEVICE — SCD SEQUENTIAL COMPRESSION COMFORT SLEEVE MEDIUM KNEE LENGTH: Brand: KENDALL SCD

## (undated) DEVICE — BETHLEHEM UNIVERSAL GYN LAP PK: Brand: CARDINAL HEALTH

## (undated) DEVICE — BLUE HEAT SCOPE WARMER

## (undated) DEVICE — TRAY FOLEY 16FR URIMETER SILICONE SURESTEP

## (undated) DEVICE — [HIGH FLOW INSUFFLATOR,  DO NOT USE IF PACKAGE IS DAMAGED,  KEEP DRY,  KEEP AWAY FROM SUNLIGHT,  PROTECT FROM HEAT AND RADIOACTIVE SOURCES.]: Brand: PNEUMOSURE

## (undated) DEVICE — UNDYED BRAIDED (POLYGLACTIN 910), SYNTHETIC ABSORBABLE SUTURE: Brand: COATED VICRYL

## (undated) DEVICE — PREMIUM DRY TRAY LF: Brand: MEDLINE INDUSTRIES, INC.

## (undated) DEVICE — SUT MONOCRYL 4-0 PS-2 27 IN Y426H

## (undated) DEVICE — TROCAR: Brand: KII FIOS FIRST ENTRY

## (undated) DEVICE — LAPAROSCOPIC SMOKE EVAC TUBING

## (undated) DEVICE — SUT ETHIBOND 5 V-37 30 IN MB66G

## (undated) DEVICE — PLASTIC ADHESIVE BANDAGE: Brand: CURITY

## (undated) DEVICE — PENCIL ELECTROSURG E-Z CLEAN -0035H

## (undated) DEVICE — INSUFFLATION NEEDLE TO ESTABLISH PNEUMOPERITONEUM.: Brand: INSUFFLATION NEEDLE

## (undated) DEVICE — TROCAR: Brand: KII SLEEVE

## (undated) DEVICE — 3000CC GUARDIAN II: Brand: GUARDIAN

## (undated) DEVICE — PMI DISPOSABLE PUNCTURE CLOSURE DEVICE / SUTURE GRASPER: Brand: PMI

## (undated) DEVICE — D + E CONNECTION HOSE

## (undated) DEVICE — ENSEAL X1 TISSUE SEALER, CURVED JAW, 37 CM SHAFT LENGTH: Brand: ENSEAL

## (undated) DEVICE — CURETTE VACURETTE CRVD 8MM

## (undated) DEVICE — SUT PLAIN 3-0 PS-1 27 IN 1640H

## (undated) DEVICE — SUT ETHIBOND 5-0 LR/LR 30 IN B499T

## (undated) DEVICE — NEEDLE COUNTER LG W/RULER

## (undated) DEVICE — COLLECTION SET, DISPOSABLE WITH HANDLE AND TAPERED FITTINGS TUBING, 6 FT (183 CM): Brand: GYRUS ACMI

## (undated) DEVICE — GLOVE PI ULTRA TOUCH SZ.7.0

## (undated) DEVICE — SURGICAL GOWN, XL SMARTSLEEVE: Brand: CONVERTORS

## (undated) DEVICE — D + E SAFE TOUCH TISSUE TRAP (CIRCON)

## (undated) DEVICE — D + E SUCTION CANISTER